# Patient Record
Sex: FEMALE | Race: BLACK OR AFRICAN AMERICAN | NOT HISPANIC OR LATINO
[De-identification: names, ages, dates, MRNs, and addresses within clinical notes are randomized per-mention and may not be internally consistent; named-entity substitution may affect disease eponyms.]

---

## 2019-06-19 PROBLEM — Z00.00 ENCOUNTER FOR PREVENTIVE HEALTH EXAMINATION: Status: ACTIVE | Noted: 2019-06-19

## 2019-07-22 ENCOUNTER — APPOINTMENT (OUTPATIENT)
Dept: ORTHOPEDIC SURGERY | Facility: CLINIC | Age: 54
End: 2019-07-22
Payer: MEDICARE

## 2019-08-26 ENCOUNTER — APPOINTMENT (OUTPATIENT)
Dept: ORTHOPEDIC SURGERY | Facility: CLINIC | Age: 54
End: 2019-08-26
Payer: MEDICARE

## 2019-09-23 ENCOUNTER — APPOINTMENT (OUTPATIENT)
Dept: ORTHOPEDIC SURGERY | Facility: CLINIC | Age: 54
End: 2019-09-23
Payer: MEDICARE

## 2019-09-23 VITALS — OXYGEN SATURATION: 98 % | WEIGHT: 198 LBS | HEART RATE: 86 BPM | HEIGHT: 65 IN | BODY MASS INDEX: 32.99 KG/M2

## 2019-09-23 DIAGNOSIS — Z87.09 PERSONAL HISTORY OF OTHER DISEASES OF THE RESPIRATORY SYSTEM: ICD-10-CM

## 2019-09-23 DIAGNOSIS — Z82.61 FAMILY HISTORY OF ARTHRITIS: ICD-10-CM

## 2019-09-23 DIAGNOSIS — F17.200 NICOTINE DEPENDENCE, UNSPECIFIED, UNCOMPLICATED: ICD-10-CM

## 2019-09-23 DIAGNOSIS — Z87.39 PERSONAL HISTORY OF OTHER DISEASES OF THE MUSCULOSKELETAL SYSTEM AND CONNECTIVE TISSUE: ICD-10-CM

## 2019-09-23 PROCEDURE — 73564 X-RAY EXAM KNEE 4 OR MORE: CPT | Mod: LT

## 2019-09-23 PROCEDURE — 73560 X-RAY EXAM OF KNEE 1 OR 2: CPT | Mod: RT

## 2019-09-23 PROCEDURE — 20610 DRAIN/INJ JOINT/BURSA W/O US: CPT | Mod: LT

## 2019-09-23 PROCEDURE — 99204 OFFICE O/P NEW MOD 45 MIN: CPT | Mod: 25

## 2019-09-23 NOTE — PROCEDURE
[de-identified] : Discussed at length the procedure of a knee aspiration. The risks, benefits, convalescence and alternatives were reviewed. The possible side effects discussed included but were not limited to: pain, swelling, bleeding and infection. Following this discussion, the knee was prepped with betadine and under a sterile condition, a 18 gauge needle was inserted into the joint through a lateral approach just superior to the patella with the knee in an extended position. The fluid was aspirated and sent for evaluation. Upon withdrawal of the needle the site was cleaned with alcohol and a bandaid applied. The patient tolerated the aspiration well and there were no adverse effects. Post aspiration instructions included no strenuous activity for 24 hours, cryotherapy and if there are any adverse effects to contact the office.\par \par 30 cc of clear yellow synovial fluid was aspirated from the left knee.

## 2019-09-23 NOTE — HISTORY OF PRESENT ILLNESS
[de-identified] : 53 year old female presents for initial evaluation of chronic left knee pain for the past 6 years. Her history is significant for a septic left knee at [facility] in 2013, treated twice with I&D. The infection was unresolved and she underwent primary spacer insertion several months later with non weight bearing. Patient did have a fracture of her spacer, but then underwent TKR at [facility]. She continued to have difficulty with severe pain and instability. She endorses swelling, grinding, and knocking. Her pain is diffuse and severely limiting her ADL. Patient uses crutches for ambulation and applies a brace daily. She currently takes Oxycodone given  by her PCP for pain control. Of note, she had a right TKR at [facility]  in 2019. A bone scan recently performed possible loosening of her right TKR.

## 2019-09-23 NOTE — DISCUSSION/SUMMARY
[de-identified] : Discussed at length the nature of the patients condition. 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 Discussed at length the nature of the failed total knee replacement and reviewed non-operative and operative treatment. Due to the pain and associated disability I recommend a LEFT revision total knee replacement. The risks, benefits, convalescence and alternatives were reviewed.  Numerous questions were asked and answered. Models were used as an educational tool. We did discuss implant choice and fixation, with shared decision making with the patient. Surgery will be scheduled at a convenient time. LCCK Prosthesis and TM cones should be available. Preop medical clearance. The left knee was aspirated as described above. I will order CBC, ESR, CRP, and D-DIMER as a baseline. When results are available, we will discuss further.

## 2019-09-23 NOTE — ADDENDUM
[FreeTextEntry1] : This note was written by Martha Johnson on 09/23/2019 acting as scribe for Dr. Parth Barraza M.D.\par \par I, Dr. Parth Barraza M.D., have read and attest that all the information, medical decision making and discharge instructions within are true and accurate.

## 2019-09-23 NOTE — PHYSICAL EXAM
[de-identified] : General appearance: well nourished and hydrated, pleasant, alert and oriented x 3, cooperative.\par HEENT: Normocephalic, EOM intact, Nasal septum midline, Oral cavity clear, External auditory canal clear.\par Cardiovascular: no apparent abnormalities, no lower leg edema, no varicosities, pedal pulses are palpable.\par Lymphatics Lymph nodes: none palpated, Lymphedema: not present.\par Neurologic: sensation is normal, no muscle weakness in upper or lower extremities, patella tendon reflexes intact .\par Dermatologic no apparent skin lesions, moist, warm, no rash.\par Spine: cervical spine appears normal and moves freely, thoracic spine appears normal and moves freely, limited mobility at lumbosacral spine \par Gait: bilateral lateral thrust\par \par Left Knee\par Inspection: mild effusion\par Wounds: healed midline incision\par Alignment: slight varus alignment \par Palpation: no specific tenderness on palpation.\par ROM: Active (in degrees) 0-100 \par Ligamentous laxity (neg): medial lateral laxity in flexion and extension, increased AP translation \par Patellofemoral Alignment Test: Q angle-, normal.\par Muscle Test: good quad strength.\par Leg examination: calf is soft and non-tender.\par \par Right Knee\par Inspection: no effusion\par Wounds: healed midline incision\par Alignment: normal.\par Palpation: no specific tenderness on palpation.\par ROM: Active (in degrees) 0-90\par Ligamentous laxity (neg): negative ant. drawer test, negative post. drawer test, stable to varus stress test, stable to valgus stress test,\par Patellofemoral Alignment Test: Q angle-, normal.\par Muscle Test: good quad strength.\par Leg examination: calf is soft and non-tender. \par \par Left hip\par Inspection: No swelling or ecchymosis.\par Wounds: none.\par Palpation: non-tender.\par Stability: no instability.\par Strength: 5/5 all motor groups.\par ROM: no pain with FROM.\par Leg length: equal.\par \par Right hip\par Inspection: No swelling or ecchymosis.\par Wounds: none.\par Palpation: non-tender.\par Stability: no instability.\par Strength: 5/5 all motor groups.\par ROM: no pain with FROM.\par Leg length: equal.\par \par Left ankle\par Inspection: no erythema noted, no swelling noted.\par Palpation: no pain on palpation .\par ROM: FROM without crepitus.\par Muscle strength: 5/5.\par Stability: no instability noted.\par \par Right ankle\par Inspection: no erythema noted, no swelling noted.\par ROM: FROM without crepitus.\par Palpation: no pain on palpation .\par Muscle strength: 5/5.\par Stability: no instability noted.\par \par Left foot\par Inspection: color, texture and turgor are normal.\par ROM: full range of motion of all joints without pain or crepitus.\par Palpation: no tenderness.\par Stability: no instability noted.\par \par Right foot\par Inspection: color, texture and turgor are normal.\par ROM: full range of motion of all joints without pain or crepitus.\par Palpation: no tenderness.\par Stability: no instability noted.\par \par Left shoulder\par Inspection: no muscle asymmetry, no atrophy.\par Palpation: no tenderness noted, ACJ non-tender.\par ROM: full active ROM, full passive ROM.\par Strength testing): anterior deltoid, supraspinatus, infraspinatus, subscapularis all 5/5.\par Stability test: ant. apprehension negative, post. apprehension negative, relocation test negative.\par Impingement Test: negative NEER.\par \par Right shoulder\par Inspection: no muscle asymmetry, no atrophy.\par Palpation: no tenderness noted, ACJ non-tender.\par ROM: full active ROM, full passive ROM.\par Strength testing): anterior deltoid, supraspinatus, infraspinatus, subscapularis all 5/5.\par Stability test: ant. apprehension negative, post. apprehension negative, relocation test negative.\par Impingement Test: negative NEER.\par Surgical Wounds: none.\par \par Left elbow\par Inspection: negative swelling.\par Wounds: none.\par Palpation: non-tender.\par ROM: full ROM.\par Strength: 5/5 all groups.\par Stability: no instability.\par Mass: none.\par \par Right elbow\par Inspection: negative swelling.\par Wounds: none.\par Palpation: non-tender.\par ROM: full ROM.\par Strength: 5/5 all groups.\par Stability: no instability.\par Mass: none.\par \par Left wrist\par Inspection: negative swelling.\par Wound: none.\par Palpation (bone): no tenderness.\par ROM: full ROM.\par Strength: full , good.\par \par Right wrist\par Inspection: negative swelling.\par Wound: none.\par Palpation (bone): no tenderness.\par ROM: full ROM.\par Strength: full , good.\par \par Left hand\par Inspection: no skin changes, normal appearance.\par Wounds: none.\par Strength: full , able to make full fist.\par Sensation: light touch intact all fingers and thumb.\par Vascular: good capillary refill < 3 seconds, all fingers and thumb.\par Mass: none.\par \par Right hand\par Inspection: no skin changes, normal appearance. \par Wounds: none.\par Palpation: non-tender throughout.\par Strength: full , able to make full fist.\par Sensation: light touch intact all fingers and thumb.\par Vascular: good capillary refill < 3 seconds, all fingers and thumb.\par Mass: none.\par   [de-identified] : Left knee xrays, AP, lateral, merchant view taken at the office today demonstrates a revision total knee prosthesis with long stems, radiolucent lines and sclerotic mantle around the femoral and tibial stem with femoral lateral cortex hypertrophy consistent with loosening, radiolucent lines beneath tibial component, asymmetry of polyethylene suggestive of either wear or instability, speckled calcification posterior capsule, patella sits at an appropriate height in a central position with impingement of lateral facet \par \par Right knee xray merchant view taken at the office today demonstrates a total knee replacement in satisfactory position and alignment with the patella in a central position

## 2019-10-14 ENCOUNTER — APPOINTMENT (OUTPATIENT)
Dept: ORTHOPEDIC SURGERY | Facility: CLINIC | Age: 54
End: 2019-10-14

## 2020-01-13 ENCOUNTER — FORM ENCOUNTER (OUTPATIENT)
Age: 55
End: 2020-01-13

## 2020-01-14 ENCOUNTER — OUTPATIENT (OUTPATIENT)
Dept: OUTPATIENT SERVICES | Facility: HOSPITAL | Age: 55
LOS: 1 days | End: 2020-01-14
Payer: MEDICARE

## 2020-01-14 ENCOUNTER — APPOINTMENT (OUTPATIENT)
Dept: ORTHOPEDIC SURGERY | Facility: CLINIC | Age: 55
End: 2020-01-14
Payer: MEDICARE

## 2020-01-14 ENCOUNTER — APPOINTMENT (OUTPATIENT)
Dept: ORTHOPEDIC SURGERY | Facility: HOSPITAL | Age: 55
End: 2020-01-14

## 2020-01-14 VITALS — HEIGHT: 65 IN | WEIGHT: 198 LBS | BODY MASS INDEX: 32.99 KG/M2

## 2020-01-14 DIAGNOSIS — M25.562 PAIN IN LEFT KNEE: ICD-10-CM

## 2020-01-14 DIAGNOSIS — T84.033A MECHANICAL LOOSENING OF INTERNAL LEFT KNEE PROSTHETIC JOINT, INITIAL ENCOUNTER: ICD-10-CM

## 2020-01-14 DIAGNOSIS — Z96.652 PRESENCE OF LEFT ARTIFICIAL KNEE JOINT: ICD-10-CM

## 2020-01-14 DIAGNOSIS — Z96.659 OTHER MECHANICAL COMPLICATION OF OTHER INTERNAL JOINT PROSTHESIS, INITIAL ENCOUNTER: ICD-10-CM

## 2020-01-14 DIAGNOSIS — Z87.39 PERSONAL HISTORY OF OTHER DISEASES OF THE MUSCULOSKELETAL SYSTEM AND CONNECTIVE TISSUE: ICD-10-CM

## 2020-01-14 DIAGNOSIS — T84.098A OTHER MECHANICAL COMPLICATION OF OTHER INTERNAL JOINT PROSTHESIS, INITIAL ENCOUNTER: ICD-10-CM

## 2020-01-14 PROCEDURE — 87075 CULTR BACTERIA EXCEPT BLOOD: CPT

## 2020-01-14 PROCEDURE — 89060 EXAM SYNOVIAL FLUID CRYSTALS: CPT

## 2020-01-14 PROCEDURE — 72170 X-RAY EXAM OF PELVIS: CPT

## 2020-01-14 PROCEDURE — 20610 DRAIN/INJ JOINT/BURSA W/O US: CPT | Mod: LT

## 2020-01-14 PROCEDURE — 87205 SMEAR GRAM STAIN: CPT

## 2020-01-14 PROCEDURE — 72170 X-RAY EXAM OF PELVIS: CPT | Mod: 26

## 2020-01-14 PROCEDURE — 73560 X-RAY EXAM OF KNEE 1 OR 2: CPT | Mod: 26,50

## 2020-01-14 PROCEDURE — 99215 OFFICE O/P EST HI 40 MIN: CPT | Mod: 25

## 2020-01-14 PROCEDURE — 87070 CULTURE OTHR SPECIMN AEROBIC: CPT

## 2020-01-14 PROCEDURE — 73560 X-RAY EXAM OF KNEE 1 OR 2: CPT

## 2020-01-14 PROCEDURE — 89051 BODY FLUID CELL COUNT: CPT

## 2020-01-14 PROCEDURE — 87102 FUNGUS ISOLATION CULTURE: CPT

## 2020-01-14 NOTE — HISTORY OF PRESENT ILLNESS
[de-identified] : 53y/o female presenting for evaluation of troublesome bilateral total knee replacements. She reports that she had septic arthritis of the left knee in 2012. This was treated with two I&Ds at [facility] in 2012, followed by an antibiotic spacer later that year. She ambulated on the spacer for over two years before a definitive TKA was performed at [facility] in 2015. She got some improvement in left knee function for some time, but then the knee became more painful and swollen at some unspecified amount of time later. She complains also of left knee instability. She underwent right TKA at [place] in March 2019. The right side is doing ok but she complains of an apparently limb-length discrepancy now with the right side longer. She has been using a shoe lift without much relief. She saw Dr. Barraza for all these complaints in August and a knee aspiration was performed. Symptoms are about the same now. She is seeking surgical evaluation.\par \par PMH significant only for asthma. She admits to smoking about a half pack per day on average. None

## 2020-01-14 NOTE — PROCEDURE
[Aspiration] : Aspiration [Left] : of the left [Knee Joint] : knee joint [Diagnostic] : Diagnostic [Patient] : patient [Alcohol] : Alcohol [Betadine] : Betadine [Ethyl Chloride Spray] : ethyl chloride spray was used as a topical anesthetic [Lateral] : lateral [18] : an 18-gauge [___ mL Fluid] : [unfilled] mL of [Cloudy] : cloudy [Bloody] : bloody [Bandage Applied] : a bandage [Culture] : culture [Cell Count] : cell count [Gram Stain] : gram stain [Crystal Analysis] : crystal analysis [None] : none

## 2020-01-14 NOTE — PHYSICAL EXAM
[de-identified] : General appearance: well nourished and hydrated, pleasant, alert and oriented x 3, cooperative.  Cheerful.\par HEENT: normocephalic, EOM intact, nasal septum midline, oral cavity clear, external auditory canal clear.  \par Cardiovascular: no lower leg edema, no varicosities, dorsalis pedis pulses palpable and symmetric.  \par Lymphatics: no palpable lymphadenopathy, no lymphedema.  \par Neurologic: sensation is normal, no muscle weakness in upper or lower extremities, patella tendon reflexes present and symmetric.  \par Dermatologic: skin moist, warm, no rash.  \par Spine: cervical spine with normal lordosis and painless range of motion, thoracic spine with normal kyphosis and painless range of motion, lumbosacral spine with normal lordosis and painless range of motion.  No tenderness to palpation along midline spine and paraspinal musculature.  Sacroiliac joints nontender bilaterally. Negative SLR and crossed SLR tests bilaterally.\par Gait: waddling gait with more pronounced left lateral thrust.\par \par Left knee:\par - Inspection: large effusion with soft tissue swelling, negative ecchymosis or erythema.  \par - Wounds: healed long midline incision.\par - Alignment: correctable varus.\par - Palpation: no specific tenderness on palpation.  \par - ROM active: 5 hyper - 90, no pain on extremes of motion\par - Ligamentous laxity: about 1-2cm ant/post translation on flexion drawers, marked pseudoinstability to varus stress, stable to valgus stress.  \par - Popliteal angle: 60 degrees\par - Muscle Test: 5/5 quad strength.  \par \par Right knee:\par - Inspection: small effusion and soft tissue swelling, negative ecchymosis and erythema.  \par - Wounds: healed midline incision, healed stab incisions x2 both proximally and distally\par - Alignment: non-correctable varus.\par - Palpation: no specific tenderness on palpation.  \par - ROM active: 5-100, no pain on extremes of motion\par - Ligamentous laxity: about 5mm ant/post translation of flexion drawers, stable to varus stress, stable to valgus stress.  \par - Popliteal angle: 60 degrees\par - Muscle Test: 5/5 quad strength. [de-identified] : AP pelvis and 1 views of the bilateral knees (apparently non-weightbearing AP) were obtained today. Also reviewed 4 views of the left knee and Merchant of the right from August.\par \par Bilateral hips demonstrate normal alignment with minimal arthritis (Tonnis 1). There is no proximal femoral or acetabular deformity. There is no fracture or prior fracture deformity. There is no radiographic evidence of osteonecrosis.\par \par Bilateral sacroiliac joints display mild arthrosis.\par \par The left knee has a revision total knee replacement in place with tibial and femoral stems. There is varus coronal alignment and normal sagittal alignment. There are radiolucent lines and sclerotic mantles at the bone-implant interfaces throughout both the tibia and femur, consistent with loosening. There is significant bone loss most pronounced at the femoral trochlea and the medial tibial plateau. There are hazy radiodensities throughout the region of the posterior capsule. There is narrowing of the medial tibiofemoral space suspicious for polyethylene wear vs. fracture. The patella sits at appropriate height and appears to be articulating directly with the femoral component although there is a possible central peg hole visible on the Merchant.\par \par The right knee has a primary fully cemented total knee replacement in place with a resurfaced patella. Implants appear to be Triathlon. On the limited views available, the tibia appears to be collapsing into varus. Radiolucent lines at the medial and lateral plateau bone-implant interfaces, though not under/around the keel. Evaluation of the femoral component is limited. Patella appears to be tracking centrally.\par

## 2020-01-14 NOTE — CONSULT LETTER
[Dear  ___] : Dear  [unfilled], [Consult Letter:] : I had the pleasure of evaluating your patient, [unfilled]. [Please see my note below.] : Please see my note below. [Consult Closing:] : Thank you very much for allowing me to participate in the care of this patient.  If you have any questions, please do not hesitate to contact me. [Sincerely,] : Sincerely, [FreeTextEntry3] : Kevon Fay MD\par Orthopaedic Surgery - Adult Reconstruction\par [facility] [place]\par [address]\[place] [address]\par p. [phone]\par f. [phone]\par

## 2020-01-14 NOTE — DISCUSSION/SUMMARY
[de-identified] : 55y/o female with loosening of revision left total knee replacement, possible loosening of right total knee replacement\par - Unclear as to why she has such severe loosening at a relatively early time point. Differential includes persistent infection vs. smoking. She will need revision. The right side is also an issue but not as symptomatic; will require additional workup and possible surgery later on.\par - Left knee aspiration performed as above; will follow up synovial labs\par - Serum CBC+diff, CRP, ESR, D-dimer today\par - CT left knee for preoperative planning\par - Importance of complete nicotine cessation stressed to the patient. She understands and will quit.\par - Will plan for left knee revision arthroplasty (either single stage or two-stage revision pending results of labs) in about 2 months\par - Revisit next month for complete right knee films and likely aspiration. Cotinine check at that time. I asked her to also bring her operative reports for the right TKA and most recent left TKA.

## 2020-01-15 PROBLEM — Z87.39 HISTORY OF ARTHRITIS: Status: RESOLVED | Noted: 2020-01-14 | Resolved: 2020-01-15

## 2020-01-15 LAB
BASOPHILS # BLD AUTO: 0.04 K/UL
BASOPHILS NFR BLD AUTO: 0.6 %
CRP SERPL-MCNC: 1.26 MG/DL
DEPRECATED D DIMER PPP IA-ACNC: 864 NG/ML DDU
EOSINOPHIL # BLD AUTO: 0.6 K/UL
EOSINOPHIL NFR BLD AUTO: 9.1 %
ERYTHROCYTE [SEDIMENTATION RATE] IN BLOOD BY WESTERGREN METHOD: 37 MM/HR
HCT VFR BLD CALC: 41.8 %
HGB BLD-MCNC: 12.7 G/DL
IMM GRANULOCYTES NFR BLD AUTO: 0.2 %
LYMPHOCYTES # BLD AUTO: 2.47 K/UL
LYMPHOCYTES NFR BLD AUTO: 37.7 %
MAN DIFF?: NORMAL
MCHC RBC-ENTMCNC: 29.6 PG
MCHC RBC-ENTMCNC: 30.4 GM/DL
MCV RBC AUTO: 97.4 FL
MONOCYTES # BLD AUTO: 0.34 K/UL
MONOCYTES NFR BLD AUTO: 5.2 %
NEUTROPHILS # BLD AUTO: 3.1 K/UL
NEUTROPHILS NFR BLD AUTO: 47.2 %
PLATELET # BLD AUTO: 396 K/UL
RBC # BLD: 4.29 M/UL
RBC # FLD: 13.3 %
WBC # FLD AUTO: 6.56 K/UL

## 2020-02-10 ENCOUNTER — APPOINTMENT (OUTPATIENT)
Dept: ORTHOPEDIC SURGERY | Facility: CLINIC | Age: 55
End: 2020-02-10

## 2021-06-07 ENCOUNTER — RESULT REVIEW (OUTPATIENT)
Age: 56
End: 2021-06-07

## 2021-06-07 ENCOUNTER — TRANSCRIPTION ENCOUNTER (OUTPATIENT)
Age: 56
End: 2021-06-07

## 2021-06-07 ENCOUNTER — LABORATORY RESULT (OUTPATIENT)
Age: 56
End: 2021-06-07

## 2021-06-07 ENCOUNTER — OUTPATIENT (OUTPATIENT)
Dept: OUTPATIENT SERVICES | Facility: HOSPITAL | Age: 56
LOS: 1 days | End: 2021-06-07
Payer: COMMERCIAL

## 2021-06-07 ENCOUNTER — APPOINTMENT (OUTPATIENT)
Dept: ORTHOPEDIC SURGERY | Facility: CLINIC | Age: 56
End: 2021-06-07
Payer: MEDICARE

## 2021-06-07 VITALS
WEIGHT: 180 LBS | SYSTOLIC BLOOD PRESSURE: 128 MMHG | HEIGHT: 65 IN | DIASTOLIC BLOOD PRESSURE: 70 MMHG | BODY MASS INDEX: 29.99 KG/M2

## 2021-06-07 PROCEDURE — 20610 DRAIN/INJ JOINT/BURSA W/O US: CPT | Mod: RT

## 2021-06-07 PROCEDURE — 99072 ADDL SUPL MATRL&STAF TM PHE: CPT

## 2021-06-07 PROCEDURE — 73564 X-RAY EXAM KNEE 4 OR MORE: CPT | Mod: 26,50

## 2021-06-07 PROCEDURE — 72170 X-RAY EXAM OF PELVIS: CPT

## 2021-06-07 PROCEDURE — 99214 OFFICE O/P EST MOD 30 MIN: CPT | Mod: 25

## 2021-06-07 PROCEDURE — 72170 X-RAY EXAM OF PELVIS: CPT | Mod: 26

## 2021-06-07 PROCEDURE — 73564 X-RAY EXAM KNEE 4 OR MORE: CPT

## 2021-06-07 NOTE — PROCEDURE
[Aspiration] : Aspiration [Right] : of the right [Knee Joint] : knee joint [Diagnostic] : Diagnostic [Patient] : patient [Alcohol] : Alcohol [Betadine] : Betadine [Ethyl Chloride Spray] : ethyl chloride spray was used as a topical anesthetic [Lateral] : lateral [Superior] : superior [18] : an 18-gauge [___ mL Fluid] : [unfilled] mL of [Cloudy] : cloudy [Yellow] : yellow [Bandage Applied] : a bandage [Culture] : culture [Cell Count] : cell count [Gram Stain] : gram stain [Crystal Analysis] : crystal analysis [Tolerated Well] : The patient tolerated the procedure well [None] : none [FreeTextEntry1] : Right knee aspiration was performed with yield of 6cc cloudy yellow fluid. In addition to the standard infection panel, the aspirate was applied to a point of care Synovasure alpha defensin assay immediately. The assay was positive for periprosthetic infection at 10, 15, and 20 minutes.

## 2021-06-07 NOTE — PHYSICAL EXAM
[de-identified] : General appearance: well nourished and hydrated, pleasant, alert and oriented x 3, cooperative.  Tearful today.\par HEENT: normocephalic, EOM intact, wearing mask, external auditory canal clear.  \par Cardiovascular: no lower leg edema, no varicosities, dorsalis pedis pulses palpable and symmetric.  \par Lymphatics: no palpable lymphadenopathy, no lymphedema.  \par Neurologic: sensation is normal, no muscle weakness in upper or lower extremities, patella tendon reflexes present and symmetric.  \par Dermatologic: skin moist, warm, no rash.  \par Spine: cervical spine with normal lordosis and painless range of motion, thoracic spine with normal kyphosis and painless range of motion, lumbosacral spine with normal lordosis and painless range of motion.  No tenderness to palpation along midline spine and paraspinal musculature.  Sacroiliac joints nontender bilaterally. Negative SLR and crossed SLR tests bilaterally.\par Gait: severely antalgic right with cane; pronounced right varus thrust.\par \par Left knee:\par - Inspection: moderate effusion with soft tissue swelling, negative ecchymosis or erythema.  \par - Wounds: healed long midline incision.\par - Alignment: correctable varus.\par - Palpation: no specific tenderness on palpation.  \par - ROM active: 5 hyper - 90, no pain on extremes of motion\par - Ligamentous laxity: about 1-2cm ant/post translation on flexion drawers, pseudoinstability to varus stress, stable to valgus stress.  \par - Popliteal angle: 60 degrees\par - Muscle Test: 5/5 quad strength.  \par \par Right knee:\par - Inspection: small effusion and moderate diffuse soft tissue swelling, negative ecchymosis and erythema.  \par - Wounds: healed midline incision, healed stab incisions x2 both proximally and distally\par - Alignment: gross varus, partially correctable.\par - Palpation: circumferential tenderness on palpation.  \par - ROM active: 0-90 with pain throughout\par - Ligamentous laxity: about 5mm ant/post translation on flexion drawers, grossly pseudolax to varus stress, stable to valgus stress.  \par - Popliteal angle: 60 degrees\par - Muscle Test: 5/5 quad strength. [de-identified] : AP pelvis and 4 views of the bilateral knees (weightbearing AP, weightbearing Velasco, weightbearing lateral, and Sunrise) were obtained today, interpreted by me, and reviewed with the patient.\par \par Pelvic alignment: normal\par \par Right hip --\par Alignment: normal\par Arthritis: none\par Deformity: none\par Osteonecrosis: none\par \par Left hip --\par Alignment: normal\par Arthritis: none\par Deformity: none\par Osteonecrosis: none\par \par Right knee -- constrained Triathlon in position with significantly worse tibial loosening and leslie varus collapse with major proximal medial tibial bone loss as compared to prior imaging. Patella may be a touch baja and appears to track centrally.\par \par The left knee has a revision total knee replacement in place with tibial and femoral stems. There is varus coronal alignment and normal sagittal alignment. There are radiolucent lines and sclerotic mantles at the bone-implant interfaces throughout both the tibia and femur, consistent with loosening. There is significant bone loss most pronounced at the femoral trochlea and the medial tibial plateau. There are hazy radiodensities throughout the region of the posterior capsule. There is narrowing of the medial tibiofemoral space suspicious for polyethylene wear vs. fracture. The patella sits at appropriate height and appears to be articulating directly with the femoral component although there is a possible central peg hole visible on the Merchant. No major change from prior imaging.

## 2021-06-07 NOTE — REASON FOR VISIT
[Initial Visit] : an initial visit for [Knee Pain] : knee pain [FreeTextEntry2] : bilateral knee left > right  [Follow-Up Visit] : a follow-up visit for [Other: ____] : [unfilled]

## 2021-06-07 NOTE — HISTORY OF PRESENT ILLNESS
[de-identified] : 6/7/21: Reports that the left knee pain is about the same as when we last saw each other. She was never able to stop smoking and our surgical plans had been disrupted by the pandemic. However, it is now the right knee that is far more symptomatic. She had an episode in March where both legs became swollen and red. This eventually resolved but the right knee developed progressive pain and varus deformity from there. She is still ambulating with a cane but has enormous difficulty crossing a room. She is seeking surgical management as soon as possible. She denies wound problems, fevers, chills, or other systemic symptoms.\par \par 1/14/20: 53y/o female presenting for evaluation of troublesome bilateral total knee replacements. She reports that she had septic arthritis of the left knee in 2012. This was treated with two I&Ds at [facility] in 2012, followed by an antibiotic spacer later that year. She ambulated on the spacer for over two years before a definitive TKA was performed at [facility] in 2015. She got some improvement in left knee function for some time, but then the knee became more painful and swollen at some unspecified amount of time later. She complains also of left knee instability. She underwent right TKA at [facility] in March 2019. The right side is doing ok but she complains of an apparently limb-length discrepancy now with the right side longer. She has been using a shoe lift without much relief. She saw Dr. Barraza for all these complaints in August and a knee aspiration was performed. Symptoms are about the same now. She is seeking surgical evaluation.\par \par PMH significant only for asthma. She admits to smoking about a half pack per day on average.

## 2021-06-17 ENCOUNTER — INPATIENT (INPATIENT)
Facility: HOSPITAL | Age: 56
LOS: 7 days | Discharge: HOME HEALTH | DRG: 464 | End: 2021-06-25
Attending: ORTHOPAEDIC SURGERY | Admitting: ORTHOPAEDIC SURGERY
Payer: MEDICARE

## 2021-06-17 ENCOUNTER — TRANSCRIPTION ENCOUNTER (OUTPATIENT)
Age: 56
End: 2021-06-17

## 2021-06-17 VITALS
TEMPERATURE: 98 F | HEART RATE: 89 BPM | OXYGEN SATURATION: 99 % | HEIGHT: 65 IN | DIASTOLIC BLOOD PRESSURE: 70 MMHG | WEIGHT: 186.95 LBS | SYSTOLIC BLOOD PRESSURE: 121 MMHG | RESPIRATION RATE: 20 BRPM

## 2021-06-17 LAB
ALBUMIN SERPL ELPH-MCNC: 3.5 G/DL — SIGNIFICANT CHANGE UP (ref 3.3–5)
ALP SERPL-CCNC: 121 U/L — HIGH (ref 40–120)
ALT FLD-CCNC: <5 U/L — LOW (ref 10–45)
ANION GAP SERPL CALC-SCNC: 7 MMOL/L — SIGNIFICANT CHANGE UP (ref 5–17)
APTT BLD: 35.8 SEC — HIGH (ref 27.5–35.5)
AST SERPL-CCNC: 17 U/L — SIGNIFICANT CHANGE UP (ref 10–40)
B PERT IGG+IGM PNL SER: SIGNIFICANT CHANGE UP
B PERT IGG+IGM PNL SER: SIGNIFICANT CHANGE UP
BASOPHILS # BLD AUTO: 0.05 K/UL — SIGNIFICANT CHANGE UP (ref 0–0.2)
BASOPHILS NFR BLD AUTO: 0.7 % — SIGNIFICANT CHANGE UP (ref 0–2)
BILIRUB SERPL-MCNC: 0.2 MG/DL — SIGNIFICANT CHANGE UP (ref 0.2–1.2)
BLD GP AB SCN SERPL QL: POSITIVE — SIGNIFICANT CHANGE UP
BUN SERPL-MCNC: 9 MG/DL — SIGNIFICANT CHANGE UP (ref 7–23)
CALCIUM SERPL-MCNC: 9 MG/DL — SIGNIFICANT CHANGE UP (ref 8.4–10.5)
CHLORIDE SERPL-SCNC: 102 MMOL/L — SIGNIFICANT CHANGE UP (ref 96–108)
CO2 SERPL-SCNC: 30 MMOL/L — SIGNIFICANT CHANGE UP (ref 22–31)
COLOR FLD: SIGNIFICANT CHANGE UP
COLOR FLD: SIGNIFICANT CHANGE UP
CREAT SERPL-MCNC: 0.72 MG/DL — SIGNIFICANT CHANGE UP (ref 0.5–1.3)
CRP SERPL-MCNC: 60.3 MG/L — HIGH (ref 0–4)
EOSINOPHIL # BLD AUTO: 0.57 K/UL — HIGH (ref 0–0.5)
EOSINOPHIL NFR BLD AUTO: 7.8 % — HIGH (ref 0–6)
ERYTHROCYTE [SEDIMENTATION RATE] IN BLOOD: 54 MM/HR — HIGH
FLUID INTAKE SUBSTANCE CLASS: SIGNIFICANT CHANGE UP
FLUID INTAKE SUBSTANCE CLASS: SIGNIFICANT CHANGE UP
FLUID SEGMENTED GRANULOCYTES: 73 % — SIGNIFICANT CHANGE UP
FLUID SEGMENTED GRANULOCYTES: 85 % — SIGNIFICANT CHANGE UP
GLUCOSE SERPL-MCNC: 93 MG/DL — SIGNIFICANT CHANGE UP (ref 70–99)
GRAM STN FLD: SIGNIFICANT CHANGE UP
GRAM STN FLD: SIGNIFICANT CHANGE UP
HCT VFR BLD CALC: 40.7 % — SIGNIFICANT CHANGE UP (ref 34.5–45)
HGB BLD-MCNC: 12.5 G/DL — SIGNIFICANT CHANGE UP (ref 11.5–15.5)
IMM GRANULOCYTES NFR BLD AUTO: 0.3 % — SIGNIFICANT CHANGE UP (ref 0–1.5)
INR BLD: 1.05 — SIGNIFICANT CHANGE UP (ref 0.88–1.16)
LYMPHOCYTES # BLD AUTO: 2 K/UL — SIGNIFICANT CHANGE UP (ref 1–3.3)
LYMPHOCYTES # BLD AUTO: 27.4 % — SIGNIFICANT CHANGE UP (ref 13–44)
LYMPHOCYTES # FLD: 1 % — SIGNIFICANT CHANGE UP
LYMPHOCYTES # FLD: 5 % — SIGNIFICANT CHANGE UP
MCHC RBC-ENTMCNC: 27.7 PG — SIGNIFICANT CHANGE UP (ref 27–34)
MCHC RBC-ENTMCNC: 30.7 GM/DL — LOW (ref 32–36)
MCV RBC AUTO: 90 FL — SIGNIFICANT CHANGE UP (ref 80–100)
MONOCYTES # BLD AUTO: 0.59 K/UL — SIGNIFICANT CHANGE UP (ref 0–0.9)
MONOCYTES NFR BLD AUTO: 8.1 % — SIGNIFICANT CHANGE UP (ref 2–14)
MONOS+MACROS # FLD: 14 % — SIGNIFICANT CHANGE UP
MONOS+MACROS # FLD: 22 % — SIGNIFICANT CHANGE UP
NEUTROPHILS # BLD AUTO: 4.08 K/UL — SIGNIFICANT CHANGE UP (ref 1.8–7.4)
NEUTROPHILS NFR BLD AUTO: 55.7 % — SIGNIFICANT CHANGE UP (ref 43–77)
NRBC # BLD: 0 /100 WBCS — SIGNIFICANT CHANGE UP (ref 0–0)
PLATELET # BLD AUTO: 466 K/UL — HIGH (ref 150–400)
POTASSIUM SERPL-MCNC: 4.4 MMOL/L — SIGNIFICANT CHANGE UP (ref 3.5–5.3)
POTASSIUM SERPL-SCNC: 4.4 MMOL/L — SIGNIFICANT CHANGE UP (ref 3.5–5.3)
PROT SERPL-MCNC: 7.7 G/DL — SIGNIFICANT CHANGE UP (ref 6–8.3)
PROTHROM AB SERPL-ACNC: 12.6 SEC — SIGNIFICANT CHANGE UP (ref 10.6–13.6)
RBC # BLD: 4.52 M/UL — SIGNIFICANT CHANGE UP (ref 3.8–5.2)
RBC # FLD: 13.3 % — SIGNIFICANT CHANGE UP (ref 10.3–14.5)
RCV VOL RI: HIGH /UL (ref 0–0)
RCV VOL RI: HIGH /UL (ref 0–0)
RH IG SCN BLD-IMP: NEGATIVE — SIGNIFICANT CHANGE UP
RH IG SCN BLD-IMP: NEGATIVE — SIGNIFICANT CHANGE UP
SARS-COV-2 RNA SPEC QL NAA+PROBE: NEGATIVE — SIGNIFICANT CHANGE UP
SODIUM SERPL-SCNC: 139 MMOL/L — SIGNIFICANT CHANGE UP (ref 135–145)
SPECIMEN SOURCE FLD: SIGNIFICANT CHANGE UP
SPECIMEN SOURCE FLD: SIGNIFICANT CHANGE UP
SPECIMEN SOURCE: SIGNIFICANT CHANGE UP
SPECIMEN SOURCE: SIGNIFICANT CHANGE UP
SYNOVIAL CRYSTALS CLARITY: ABNORMAL
SYNOVIAL CRYSTALS CLARITY: SIGNIFICANT CHANGE UP
SYNOVIAL CRYSTALS COLOR: ABNORMAL
SYNOVIAL CRYSTALS COLOR: ABNORMAL
SYNOVIAL CRYSTALS ID: SIGNIFICANT CHANGE UP
SYNOVIAL CRYSTALS ID: SIGNIFICANT CHANGE UP
SYNOVIAL CRYSTALS TUBE: SIGNIFICANT CHANGE UP
SYNOVIAL CRYSTALS TUBE: SIGNIFICANT CHANGE UP
TOTAL NUCLEATED CELL COUNT, BODY FLUID: 1817 /UL — SIGNIFICANT CHANGE UP
TOTAL NUCLEATED CELL COUNT, BODY FLUID: 2307 /UL — SIGNIFICANT CHANGE UP
TUBE TYPE: SIGNIFICANT CHANGE UP
TUBE TYPE: SIGNIFICANT CHANGE UP
WBC # BLD: 7.31 K/UL — SIGNIFICANT CHANGE UP (ref 3.8–10.5)
WBC # FLD AUTO: 7.31 K/UL — SIGNIFICANT CHANGE UP (ref 3.8–10.5)

## 2021-06-17 PROCEDURE — 99222 1ST HOSP IP/OBS MODERATE 55: CPT

## 2021-06-17 PROCEDURE — 99233 SBSQ HOSP IP/OBS HIGH 50: CPT

## 2021-06-17 PROCEDURE — 71045 X-RAY EXAM CHEST 1 VIEW: CPT | Mod: 26

## 2021-06-17 PROCEDURE — 99285 EMERGENCY DEPT VISIT HI MDM: CPT

## 2021-06-17 PROCEDURE — 86077 PHYS BLOOD BANK SERV XMATCH: CPT

## 2021-06-17 RX ORDER — APREPITANT 80 MG/1
40 CAPSULE ORAL ONCE
Refills: 0 | Status: COMPLETED | OUTPATIENT
Start: 2021-06-18 | End: 2021-06-18

## 2021-06-17 RX ORDER — CHLORHEXIDINE GLUCONATE 213 G/1000ML
1 SOLUTION TOPICAL ONCE
Refills: 0 | Status: COMPLETED | OUTPATIENT
Start: 2021-06-18 | End: 2021-06-18

## 2021-06-17 RX ORDER — MAGNESIUM HYDROXIDE 400 MG/1
30 TABLET, CHEWABLE ORAL DAILY
Refills: 0 | Status: DISCONTINUED | OUTPATIENT
Start: 2021-06-17 | End: 2021-06-18

## 2021-06-17 RX ORDER — OXYCODONE HYDROCHLORIDE 5 MG/1
30 TABLET ORAL EVERY 6 HOURS
Refills: 0 | Status: DISCONTINUED | OUTPATIENT
Start: 2021-06-17 | End: 2021-06-18

## 2021-06-17 RX ORDER — POVIDONE-IODINE 5 %
1 AEROSOL (ML) TOPICAL ONCE
Refills: 0 | Status: COMPLETED | OUTPATIENT
Start: 2021-06-18 | End: 2021-06-18

## 2021-06-17 RX ORDER — SODIUM CHLORIDE 9 MG/ML
1000 INJECTION, SOLUTION INTRAVENOUS
Refills: 0 | Status: DISCONTINUED | OUTPATIENT
Start: 2021-06-17 | End: 2021-06-18

## 2021-06-17 RX ORDER — CELECOXIB 200 MG/1
400 CAPSULE ORAL ONCE
Refills: 0 | Status: COMPLETED | OUTPATIENT
Start: 2021-06-18 | End: 2021-06-18

## 2021-06-17 RX ORDER — GABAPENTIN 400 MG/1
600 CAPSULE ORAL ONCE
Refills: 0 | Status: COMPLETED | OUTPATIENT
Start: 2021-06-18 | End: 2021-06-18

## 2021-06-17 RX ORDER — ONDANSETRON 8 MG/1
4 TABLET, FILM COATED ORAL EVERY 4 HOURS
Refills: 0 | Status: DISCONTINUED | OUTPATIENT
Start: 2021-06-17 | End: 2021-06-18

## 2021-06-17 RX ORDER — ACETAMINOPHEN 500 MG
1000 TABLET ORAL ONCE
Refills: 0 | Status: COMPLETED | OUTPATIENT
Start: 2021-06-18 | End: 2021-06-18

## 2021-06-17 RX ADMIN — OXYCODONE HYDROCHLORIDE 30 MILLIGRAM(S): 5 TABLET ORAL at 14:15

## 2021-06-17 RX ADMIN — ONDANSETRON 4 MILLIGRAM(S): 8 TABLET, FILM COATED ORAL at 19:23

## 2021-06-17 RX ADMIN — OXYCODONE HYDROCHLORIDE 30 MILLIGRAM(S): 5 TABLET ORAL at 15:15

## 2021-06-17 NOTE — ED PROVIDER NOTE - OBJECTIVE STATEMENT
55F PMH asthma, b/l knee replacements, presents for operative management. Pt has increasing pain/swelling to R knee for several mos. Following w/ Dr. Fay. Saw Dr. Fay ~10d ago, referred to ED today for operative management. Pt has no new complaints since visit w/ Dr. Fay.  Denies f/c, SOB/CP, NVD, abd pain, urinary complaints, focal weakness/numbness, URI symptoms.

## 2021-06-17 NOTE — H&P ADULT - NSHPLABSRESULTS_GEN_ALL_CORE
Preop cbc, bmp, pt/inr, ptt, ua, ESR, CRP ord to be reviewed by medical clearance.  preop cxr ord to be reviewed by medical clearance  preop ekg ord to be reviewed by medical clearance

## 2021-06-17 NOTE — CONSULT NOTE ADULT - ATTENDING COMMENTS
Pt evaluated for pain control. Chart reviewed, plan discussed and agreed. We will continue the current regimen and titrate to pain control and side effects. We will follow up. Dr. Espinoza

## 2021-06-17 NOTE — ED ADULT TRIAGE NOTE - CHIEF COMPLAINT QUOTE
Pt presents to ED w/ c/o infection and broken implant to R knee. Pt states pain 10/10, swelling noted, warm to touch. Pt is scheduled for surgery with MD KU tomorrow. DEnies fever/chills/discharge

## 2021-06-17 NOTE — H&P ADULT - NSHPPHYSICALEXAM_GEN_ALL_CORE
General: Alert and oriented, NAD  MSK:  R Knee: right knee swollen, nonerythematous, no open wounds, n  EHL/FHL/TA/Gastro ******  DP's ****  Gross sensation to light touch intact throughout lower extremities **  **Remainder of PE as per medical clearance** General: Alert and oriented, NAD  MSK:  R Knee: right knee swollen, nonerythematous, no open wounds, TKA incision well healed.  Pt able to actively flex to 90 degrees PROM to about a 100degrees flexion.  TA/GS/EHL/FHL Firing   L knee: + swelling, nonerythematous, no open wounds, TKA incision well healed   EHL/FHL/TA/Gastro ******  DP's ****  Gross sensation to light touch intact throughout lower extremities **  **Remainder of PE as per medical clearance** General: Alert and oriented, NAD  MSK:  R Knee: right knee swollen, nonerythematous, no open wounds, TKA incision well healed.  Pt able to actively flex to 90 degrees PROM to about 100 degrees flexion.  TA/GS/EHL/FHL Firing, DPs palpable, SILT intact  L knee: + swelling, nonerythematous, no open wounds, TKA incision well healed, pt able to actively flex to to 90 degrees PROM to about 100 degrees flexion. EHL/FHL/TA/Gastro firing, DPs palpable, SILT intact    Procedure  R knee aspiration; knee prepped with chlorhexidine 18gauge needle inserted into joint space 5cc of synovial fluid aspirated.  L knee aspiration: knee prepped with chlorhexidine 18gauge needle inserted into joint space 30cc of synovial fluid aspirated.

## 2021-06-17 NOTE — ED PROVIDER NOTE - CLINICAL SUMMARY MEDICAL DECISION MAKING FREE TEXT BOX
55F PMH asthma, b/l knee replacements, presents for operative management. Pt has increasing pain/swelling to R knee for several mos. Following w/ Dr. Fay. Saw Dr. Fay ~10d ago, referred to ED today for operative management. Pt has no new complaints since visit w/ Dr. Fay. No other systemic symptoms. Vitals wnl, exam as above.  ddx: ?chronic knee infection  pre-op labs/ekg/cxr.   d/w ortho, will admit for further care.

## 2021-06-17 NOTE — H&P ADULT - ATTENDING COMMENTS
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 Primary right TKA in 2019. We first met in early 2020 at which time the left knee was more symptomatic. 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 She was lost to followup over the pandemic and she presented back again last week with gross loosening of the right knee. Right knee aspiration at that time was positive for infection based on cell counts and alpha-defensin assay. So far, the cultures are negative. I brought her in today for a right knee explantation and insertion of static antibiotic cement spacer tomorrow.    The aspiration from today of the left knee is also concerning for possible infection. I will include a reaspiration and repeat alpha-defensin assay on the left knee at the time of right knee explant tomorrow. She is aware that this may necessitate a similar explant/spacer procedure this admission for the left knee, if positive.

## 2021-06-17 NOTE — H&P ADULT - HISTORY OF PRESENT ILLNESS
56yo female s/p R TKA in 2019 at [facility] and L TKA in 2015 at [facility] pt states she has been having worsening atraumatic right knee pain with difficulty bearing weight starting in may.  Pt states that she takes oxycodone for her pain with some relief.  Pt states that prior to increase of her pain in May 2021 she has been able to ambulate without assistive device but since the progression of her pain she requires the use of crutches to ambulate.  Pt states that she has also had left knee pain that is a result of hardware loosening and states that her right knee pain is worse than her left knee pain at this time.  Pt denies any recent fever, chills, infections, antibiotic use, recent trauma, numbness or tingling down le b/l.

## 2021-06-17 NOTE — ED ADULT TRIAGE NOTE - MODE OF ARRIVAL
Moisés Cortez is a 66year old female. Patient presents with:  Ear Wax: bilateral -- States left ear is worse. Denies pain. HISTORY OF PRESENT ILLNESS    4/8/16  Here for evaluation of  bilateral hearing loss.  Patient feels this has worsened over t arthritis ([facility] [address]    • Cancer ([facility] [address]      melanoma on back         Past Surgical History    TEAR DUCT SYSTEM SURG UNLISTED  1968    HYSTERECTOMY      CATARACT      CHOLECYSTECTOMY      HIP REPLACEMENT SURGERY Right     KNEE REPLACEMENT SURGERY      Comment to operating microscope. Cerumen impaction was removed from bilateral ears using suction. Tympanic membranes were noted to be normal. Patient tolerated the procedure well. All questions were answered.       Current outpatient prescriptions:   •  Niacin ER, An 2 MG Oral Cap, , Disp: , Rfl:   •  folic acid 1 MG Oral Tab, Take one daily. , Disp: 90 tablet, Rfl: 3  •  acetaminophen (TYLENOL EXTRA STRENGTH) 500 MG Oral Tab, Take 500 mg by mouth every 6 (six) hours as needed. , Disp: , Rfl:   •  hydrochlorothiazide (HY Walk in

## 2021-06-17 NOTE — H&P ADULT - ASSESSMENT
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   - Admit  - Medicine consulted for preop clearance  - b/l knees aspirated will f/u cultures and gram stains  - plan for OR tomorrow for R knee explant antibiotic spacer implant   - pain control prn  - hold chemical ac preop  - NPO/IVF at midnight  - ID and PM consulted will appreciate recs   - WBAT b/l le   - Dispo pending OR

## 2021-06-18 ENCOUNTER — RESULT REVIEW (OUTPATIENT)
Age: 56
End: 2021-06-18

## 2021-06-18 ENCOUNTER — APPOINTMENT (OUTPATIENT)
Dept: ORTHOPEDIC SURGERY | Facility: HOSPITAL | Age: 56
End: 2021-06-18

## 2021-06-18 DIAGNOSIS — T84.59XA INFECTION AND INFLAMMATORY REACTION DUE TO OTHER INTERNAL JOINT PROSTHESIS, INITIAL ENCOUNTER: ICD-10-CM

## 2021-06-18 LAB
ANION GAP SERPL CALC-SCNC: 9 MMOL/L — SIGNIFICANT CHANGE UP (ref 5–17)
BUN SERPL-MCNC: 15 MG/DL — SIGNIFICANT CHANGE UP (ref 7–23)
CALCIUM SERPL-MCNC: 8.8 MG/DL — SIGNIFICANT CHANGE UP (ref 8.4–10.5)
CHLORIDE SERPL-SCNC: 104 MMOL/L — SIGNIFICANT CHANGE UP (ref 96–108)
CO2 SERPL-SCNC: 28 MMOL/L — SIGNIFICANT CHANGE UP (ref 22–31)
COVID-19 SPIKE DOMAIN AB INTERP: POSITIVE
COVID-19 SPIKE DOMAIN ANTIBODY RESULT: >250 U/ML — HIGH
CREAT SERPL-MCNC: 0.76 MG/DL — SIGNIFICANT CHANGE UP (ref 0.5–1.3)
GLUCOSE SERPL-MCNC: 90 MG/DL — SIGNIFICANT CHANGE UP (ref 70–99)
GRAM STN FLD: SIGNIFICANT CHANGE UP
HCT VFR BLD CALC: 37.6 % — SIGNIFICANT CHANGE UP (ref 34.5–45)
HCV AB S/CO SERPL IA: 67.39 S/CO — HIGH
HCV AB SERPL-IMP: REACTIVE
HGB BLD-MCNC: 11.5 G/DL — SIGNIFICANT CHANGE UP (ref 11.5–15.5)
MCHC RBC-ENTMCNC: 28.3 PG — SIGNIFICANT CHANGE UP (ref 27–34)
MCHC RBC-ENTMCNC: 30.6 GM/DL — LOW (ref 32–36)
MCV RBC AUTO: 92.4 FL — SIGNIFICANT CHANGE UP (ref 80–100)
NRBC # BLD: 0 /100 WBCS — SIGNIFICANT CHANGE UP (ref 0–0)
PLATELET # BLD AUTO: 406 K/UL — HIGH (ref 150–400)
POTASSIUM SERPL-MCNC: 4.8 MMOL/L — SIGNIFICANT CHANGE UP (ref 3.5–5.3)
POTASSIUM SERPL-SCNC: 4.8 MMOL/L — SIGNIFICANT CHANGE UP (ref 3.5–5.3)
RBC # BLD: 4.07 M/UL — SIGNIFICANT CHANGE UP (ref 3.8–5.2)
RBC # FLD: 13.4 % — SIGNIFICANT CHANGE UP (ref 10.3–14.5)
SARS-COV-2 IGG+IGM SERPL QL IA: >250 U/ML — HIGH
SARS-COV-2 IGG+IGM SERPL QL IA: POSITIVE
SODIUM SERPL-SCNC: 141 MMOL/L — SIGNIFICANT CHANGE UP (ref 135–145)
SPECIMEN SOURCE: SIGNIFICANT CHANGE UP
WBC # BLD: 6.44 K/UL — SIGNIFICANT CHANGE UP (ref 3.8–10.5)
WBC # FLD AUTO: 6.44 K/UL — SIGNIFICANT CHANGE UP (ref 3.8–10.5)

## 2021-06-18 PROCEDURE — 11981 INSERTION DRUG DLVR IMPLANT: CPT | Mod: RT

## 2021-06-18 PROCEDURE — 20610 DRAIN/INJ JOINT/BURSA W/O US: CPT | Mod: 59,LT

## 2021-06-18 PROCEDURE — 27488 REMOVAL OF KNEE PROSTHESIS: CPT | Mod: RT

## 2021-06-18 PROCEDURE — 73560 X-RAY EXAM OF KNEE 1 OR 2: CPT | Mod: 26,RT

## 2021-06-18 PROCEDURE — 88304 TISSUE EXAM BY PATHOLOGIST: CPT | Mod: 26

## 2021-06-18 PROCEDURE — 88305 TISSUE EXAM BY PATHOLOGIST: CPT | Mod: 26

## 2021-06-18 PROCEDURE — 88311 DECALCIFY TISSUE: CPT | Mod: 26

## 2021-06-18 PROCEDURE — 99233 SBSQ HOSP IP/OBS HIGH 50: CPT | Mod: GC

## 2021-06-18 RX ORDER — MAGNESIUM HYDROXIDE 400 MG/1
30 TABLET, CHEWABLE ORAL DAILY
Refills: 0 | Status: DISCONTINUED | OUTPATIENT
Start: 2021-06-18 | End: 2021-06-25

## 2021-06-18 RX ORDER — BUPIVACAINE 13.3 MG/ML
20 INJECTION, SUSPENSION, LIPOSOMAL INFILTRATION ONCE
Refills: 0 | Status: DISCONTINUED | OUTPATIENT
Start: 2021-06-18 | End: 2021-06-18

## 2021-06-18 RX ORDER — CEFEPIME 1 G/1
INJECTION, POWDER, FOR SOLUTION INTRAMUSCULAR; INTRAVENOUS
Refills: 0 | Status: DISCONTINUED | OUTPATIENT
Start: 2021-06-18 | End: 2021-06-21

## 2021-06-18 RX ORDER — VANCOMYCIN HCL 1 G
VIAL (EA) INTRAVENOUS
Refills: 0 | Status: DISCONTINUED | OUTPATIENT
Start: 2021-06-18 | End: 2021-06-20

## 2021-06-18 RX ORDER — SODIUM CHLORIDE 9 MG/ML
1000 INJECTION, SOLUTION INTRAVENOUS
Refills: 0 | Status: DISCONTINUED | OUTPATIENT
Start: 2021-06-18 | End: 2021-06-18

## 2021-06-18 RX ORDER — SENNA PLUS 8.6 MG/1
2 TABLET ORAL AT BEDTIME
Refills: 0 | Status: DISCONTINUED | OUTPATIENT
Start: 2021-06-18 | End: 2021-06-25

## 2021-06-18 RX ORDER — METHOCARBAMOL 500 MG/1
500 TABLET, FILM COATED ORAL EVERY 8 HOURS
Refills: 0 | Status: DISCONTINUED | OUTPATIENT
Start: 2021-06-18 | End: 2021-06-25

## 2021-06-18 RX ORDER — VANCOMYCIN HCL 1 G
1250 VIAL (EA) INTRAVENOUS ONCE
Refills: 0 | Status: COMPLETED | OUTPATIENT
Start: 2021-06-18 | End: 2021-06-18

## 2021-06-18 RX ORDER — VANCOMYCIN HCL 1 G
1250 VIAL (EA) INTRAVENOUS EVERY 12 HOURS
Refills: 0 | Status: DISCONTINUED | OUTPATIENT
Start: 2021-06-19 | End: 2021-06-20

## 2021-06-18 RX ORDER — ASPIRIN/CALCIUM CARB/MAGNESIUM 324 MG
81 TABLET ORAL
Refills: 0 | Status: DISCONTINUED | OUTPATIENT
Start: 2021-06-19 | End: 2021-06-25

## 2021-06-18 RX ORDER — HYDROMORPHONE HYDROCHLORIDE 2 MG/ML
0.5 INJECTION INTRAMUSCULAR; INTRAVENOUS; SUBCUTANEOUS EVERY 4 HOURS
Refills: 0 | Status: DISCONTINUED | OUTPATIENT
Start: 2021-06-18 | End: 2021-06-25

## 2021-06-18 RX ORDER — KETOROLAC TROMETHAMINE 30 MG/ML
15 SYRINGE (ML) INJECTION EVERY 6 HOURS
Refills: 0 | Status: DISCONTINUED | OUTPATIENT
Start: 2021-06-18 | End: 2021-06-20

## 2021-06-18 RX ORDER — OXYCODONE HYDROCHLORIDE 5 MG/1
5 TABLET ORAL EVERY 4 HOURS
Refills: 0 | Status: DISCONTINUED | OUTPATIENT
Start: 2021-06-18 | End: 2021-06-22

## 2021-06-18 RX ORDER — HYDROMORPHONE HYDROCHLORIDE 2 MG/ML
0.5 INJECTION INTRAMUSCULAR; INTRAVENOUS; SUBCUTANEOUS
Refills: 0 | Status: DISCONTINUED | OUTPATIENT
Start: 2021-06-18 | End: 2021-06-25

## 2021-06-18 RX ORDER — SODIUM CHLORIDE 9 MG/ML
1000 INJECTION, SOLUTION INTRAVENOUS
Refills: 0 | Status: DISCONTINUED | OUTPATIENT
Start: 2021-06-19 | End: 2021-06-22

## 2021-06-18 RX ORDER — VANCOMYCIN HCL 1 G
1250 VIAL (EA) INTRAVENOUS EVERY 12 HOURS
Refills: 0 | Status: DISCONTINUED | OUTPATIENT
Start: 2021-06-18 | End: 2021-06-18

## 2021-06-18 RX ORDER — OXYCODONE HYDROCHLORIDE 5 MG/1
10 TABLET ORAL EVERY 4 HOURS
Refills: 0 | Status: DISCONTINUED | OUTPATIENT
Start: 2021-06-18 | End: 2021-06-22

## 2021-06-18 RX ORDER — FOLIC ACID 0.8 MG
1 TABLET ORAL DAILY
Refills: 0 | Status: DISCONTINUED | OUTPATIENT
Start: 2021-06-18 | End: 2021-06-25

## 2021-06-18 RX ORDER — CEFEPIME 1 G/1
2000 INJECTION, POWDER, FOR SOLUTION INTRAMUSCULAR; INTRAVENOUS EVERY 8 HOURS
Refills: 0 | Status: DISCONTINUED | OUTPATIENT
Start: 2021-06-18 | End: 2021-06-18

## 2021-06-18 RX ORDER — CELECOXIB 200 MG/1
200 CAPSULE ORAL EVERY 12 HOURS
Refills: 0 | Status: DISCONTINUED | OUTPATIENT
Start: 2021-06-19 | End: 2021-06-25

## 2021-06-18 RX ORDER — ONDANSETRON 8 MG/1
4 TABLET, FILM COATED ORAL EVERY 6 HOURS
Refills: 0 | Status: DISCONTINUED | OUTPATIENT
Start: 2021-06-18 | End: 2021-06-25

## 2021-06-18 RX ORDER — CEFEPIME 1 G/1
2000 INJECTION, POWDER, FOR SOLUTION INTRAMUSCULAR; INTRAVENOUS EVERY 8 HOURS
Refills: 0 | Status: DISCONTINUED | OUTPATIENT
Start: 2021-06-19 | End: 2021-06-21

## 2021-06-18 RX ORDER — ACETAMINOPHEN 500 MG
975 TABLET ORAL EVERY 8 HOURS
Refills: 0 | Status: DISCONTINUED | OUTPATIENT
Start: 2021-06-18 | End: 2021-06-25

## 2021-06-18 RX ORDER — FAMOTIDINE 10 MG/ML
20 INJECTION INTRAVENOUS EVERY 12 HOURS
Refills: 0 | Status: DISCONTINUED | OUTPATIENT
Start: 2021-06-18 | End: 2021-06-25

## 2021-06-18 RX ORDER — CEFEPIME 1 G/1
2000 INJECTION, POWDER, FOR SOLUTION INTRAMUSCULAR; INTRAVENOUS ONCE
Refills: 0 | Status: COMPLETED | OUTPATIENT
Start: 2021-06-18 | End: 2021-06-18

## 2021-06-18 RX ADMIN — Medication 1 APPLICATION(S): at 12:34

## 2021-06-18 RX ADMIN — Medication 15 MILLIGRAM(S): at 23:05

## 2021-06-18 RX ADMIN — SODIUM CHLORIDE 120 MILLILITER(S): 9 INJECTION, SOLUTION INTRAVENOUS at 01:20

## 2021-06-18 RX ADMIN — ONDANSETRON 4 MILLIGRAM(S): 8 TABLET, FILM COATED ORAL at 00:32

## 2021-06-18 RX ADMIN — CELECOXIB 400 MILLIGRAM(S): 200 CAPSULE ORAL at 12:26

## 2021-06-18 RX ADMIN — CHLORHEXIDINE GLUCONATE 1 APPLICATION(S): 213 SOLUTION TOPICAL at 12:26

## 2021-06-18 RX ADMIN — Medication 1000 MILLIGRAM(S): at 12:40

## 2021-06-18 RX ADMIN — METHOCARBAMOL 500 MILLIGRAM(S): 500 TABLET, FILM COATED ORAL at 23:25

## 2021-06-18 RX ADMIN — GABAPENTIN 600 MILLIGRAM(S): 400 CAPSULE ORAL at 12:46

## 2021-06-18 RX ADMIN — HYDROMORPHONE HYDROCHLORIDE 0.5 MILLIGRAM(S): 2 INJECTION INTRAMUSCULAR; INTRAVENOUS; SUBCUTANEOUS at 22:47

## 2021-06-18 RX ADMIN — CELECOXIB 400 MILLIGRAM(S): 200 CAPSULE ORAL at 12:40

## 2021-06-18 RX ADMIN — SODIUM CHLORIDE 120 MILLILITER(S): 9 INJECTION, SOLUTION INTRAVENOUS at 11:49

## 2021-06-18 RX ADMIN — Medication 1000 MILLIGRAM(S): at 12:26

## 2021-06-18 RX ADMIN — APREPITANT 40 MILLIGRAM(S): 80 CAPSULE ORAL at 12:26

## 2021-06-18 NOTE — BRIEF OPERATIVE NOTE - NSICDXBRIEFPREOP_GEN_ALL_CORE_FT
PRE-OP DIAGNOSIS:  Infection of prosthetic knee joint, initial encounter 18-Jun-2021 20:59:33  Kosta Adames

## 2021-06-18 NOTE — PROGRESS NOTE ADULT - SUBJECTIVE AND OBJECTIVE BOX
Ortho Note    Pt comfortable without complaints, pain controlled  Denies CP, SOB, N/V, numbness/tingling     Vital Signs Last 24 Hrs  T(C): 36.5 (06-18-21 @ 05:00), Max: 36.5 (06-18-21 @ 05:00)  T(F): 97.7 (06-18-21 @ 05:00), Max: 97.7 (06-18-21 @ 05:00)  HR: 83 (06-18-21 @ 05:00) (83 - 83)  BP: 102/72 (06-18-21 @ 05:00) (102/72 - 102/72)  BP(mean): 85 (06-18-21 @ 05:00) (85 - 85)  RR: 17 (06-18-21 @ 05:00) (17 - 17)  SpO2: 99% (06-18-21 @ 05:00) (99% - 99%)      General: Pt Alert and oriented, NAD  Ace bandages in place   Pulses: palpable DP pulses b/l   Sensation: SILT throughout b/l  Motor: 5/5 EHL/FHL/TA/GS      55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   - Medicine consulted for preop clearance  - b/l knees aspirated will f/u cultures and gram stains  - plan for OR tomorrow for R knee explant antibiotic spacer implant   - pain control prn  - hold chemical ac preop  - NPO/IVF   - ID and PM consulted will appreciate recs   - WBAT b/l le   - Dispo pending OR       Ortho Pager [phone]

## 2021-06-18 NOTE — BRIEF OPERATIVE NOTE - NSICDXBRIEFPROCEDURE_GEN_ALL_CORE_FT
PROCEDURES:  Open replacement of right knee using articulating spacer 18-Jun-2021 20:59:55  Kosta Adames

## 2021-06-18 NOTE — PROGRESS NOTE ADULT - SUBJECTIVE AND OBJECTIVE BOX
Subjective/Interval events:  -No events overnight  -This am patient reports stable b/l knee pain well controlled on current pain regimen  -We discussed hepc ab- patient with hx of being fully treated in the past    MEDICATIONS  (STANDING):  BUpivacaine liposome 1.3% Injectable (no eMAR) 20 milliLiter(s) Local Injection once  cefepime   IVPB 2000 milliGRAM(s) IV Intermittent every 8 hours  lactated ringers. 1000 milliLiter(s) (120 mL/Hr) IV Continuous <Continuous>  vancomycin  IVPB 1250 milliGRAM(s) IV Intermittent every 12 hours    MEDICATIONS  (PRN):  magnesium hydroxide Suspension 30 milliLiter(s) Oral daily PRN Constipation  ondansetron Injectable 4 milliGRAM(s) IV Push every 4 hours PRN Nausea and/or Vomiting  oxyCODONE    IR 30 milliGRAM(s) Oral every 6 hours PRN Severe Pain (7 - 10)      Vital Signs Last 24 Hrs  T(C): 36.9 (18 Jun 2021 09:06), Max: 36.9 (18 Jun 2021 05:09)  T(F): 98.5 (18 Jun 2021 09:06), Max: 98.5 (18 Jun 2021 05:09)  HR: 75 (18 Jun 2021 09:06) (75 - 83)  BP: 107/80 (18 Jun 2021 09:06) (102/63 - 107/80)  BP(mean): 85 (18 Jun 2021 05:00) (85 - 85)  RR: 16 (18 Jun 2021 09:06) (16 - 17)  SpO2: 97% (18 Jun 2021 09:06) (97% - 100%)    PHYSICAL EXAM:  GENERAL: pleasant, NAD  NEURO: Aox3, intact strength/sensation all 4 ext   HEENT: clear op, mmm  NECK:  No JVD, no lymphadenopathy  CHEST/LUNG: Clear to auscultation bilaterally; No rales, rhonchi, wheezing. Normal work of breathing, not tachypneic  HEART: Regular rate and rhythm; No murmurs, rubs, or gallops  ABDOMEN: Soft, Nontender, Nondistended. Normoactive bowel sounds  EXTREMITIES:  b/l knees wrapped, no sig le edema     LABS (reviewed)   CBC and BMP stable, gram stain from arthrocentesis NG  Hepc ab positive     ASSESSMENT AND PLAN: 54yo female with hx of asthma, L knee septic arthritis 2012, L TKA 2015, R TKA 3/2019, presenting with worsening b/l knee pain and concern for R knee infection.  Medicine managing perioperatively  #R knee infection, pending OR likely today- see prior note for preop eval, appreciate ID recs on abx s/p OR cx, keep off abx for now   #Chronic b/l knee pain- on high dose opioid regimen at home- f/u pain management recs   #Asthma hx- clear lung exam, patient does not take inhalers at this time   #Hep C ab positive- f/u VL, likely from treated prior infection per patient    #DVT px: per primary team  #Diet: regular diet    Patient was seen and examined by me at bedside    Greater than 35 minutes spent on total encounter; more than 50% of the visit was spent counseling and/or coordinating care by the attending physician.    Aris Mandel MD [phone]  Subjective/Interval events:  -No events overnight  -This am patient reports stable b/l knee pain well controlled on current pain regimen  -We discussed hepc ab- patient with hx of being fully treated in the past    MEDICATIONS  (STANDING):  BUpivacaine liposome 1.3% Injectable (no eMAR) 20 milliLiter(s) Local Injection once  cefepime   IVPB 2000 milliGRAM(s) IV Intermittent every 8 hours  lactated ringers. 1000 milliLiter(s) (120 mL/Hr) IV Continuous <Continuous>  vancomycin  IVPB 1250 milliGRAM(s) IV Intermittent every 12 hours    MEDICATIONS  (PRN):  magnesium hydroxide Suspension 30 milliLiter(s) Oral daily PRN Constipation  ondansetron Injectable 4 milliGRAM(s) IV Push every 4 hours PRN Nausea and/or Vomiting  oxyCODONE    IR 30 milliGRAM(s) Oral every 6 hours PRN Severe Pain (7 - 10)      Vital Signs Last 24 Hrs  T(C): 36.9 (18 Jun 2021 09:06), Max: 36.9 (18 Jun 2021 05:09)  T(F): 98.5 (18 Jun 2021 09:06), Max: 98.5 (18 Jun 2021 05:09)  HR: 75 (18 Jun 2021 09:06) (75 - 83)  BP: 107/80 (18 Jun 2021 09:06) (102/63 - 107/80)  BP(mean): 85 (18 Jun 2021 05:00) (85 - 85)  RR: 16 (18 Jun 2021 09:06) (16 - 17)  SpO2: 97% (18 Jun 2021 09:06) (97% - 100%)    PHYSICAL EXAM:  GENERAL: pleasant, NAD  NEURO: Aox3, intact strength/sensation all 4 ext   HEENT: clear op, mmm  NECK:  No JVD, no lymphadenopathy  CHEST/LUNG: Clear to auscultation bilaterally; No rales, rhonchi, wheezing. Normal work of breathing, not tachypneic  HEART: Regular rate and rhythm; No murmurs, rubs, or gallops  ABDOMEN: Soft, Nontender, Nondistended. Normoactive bowel sounds  EXTREMITIES:  b/l knees wrapped, no sig le edema     LABS (reviewed)   CBC and BMP stable, gram stain from arthrocentesis NG  Hepc ab positive     ASSESSMENT AND PLAN: 54yo female with hx of asthma, L knee septic arthritis 2012, L TKA 2015, R TKA 3/2019, presenting with worsening b/l knee pain and concern for R knee infection with plan for R knee washout and antibiotic spacer implant. Medicine managing perioperatively  #R knee infection, pending OR likely today 6/18- see my prior note for preop eval, appreciate ID recs on abx s/p OR cx,  f/u arthrocentesis cx, keep off abx for now   #Chronic b/l knee pain- on high dose opioid regimen at home- f/u pain management recs   #Asthma hx- clear lung exam, patient does not take inhalers at this time   #Hep C ab positive- f/u VL, likely from treated prior infection per patient    #DVT px: per primary team  #Diet: NPO     Patient was seen and examined by me at bedside    Greater than 35 minutes spent on total encounter; more than 50% of the visit was spent counseling and/or coordinating care by the attending physician.    Aris Mandel MD [phone]

## 2021-06-18 NOTE — PROGRESS NOTE ADULT - SUBJECTIVE AND OBJECTIVE BOX
Ortho Note      Subjective:  Pt comfortable without complaints, pain controlled with current pain medication regimen. Patient NPO for OR today.   Denies CP, SOB, N/V, numbness/tingling, afebrile.       Vital Signs Last 24 Hrs  T(C): 36.9 (06-18-21 @ 09:06), Max: 36.9 (06-18-21 @ 09:06)  T(F): 98.5 (06-18-21 @ 09:06), Max: 98.5 (06-18-21 @ 09:06)  HR: 75 (06-18-21 @ 09:06) (75 - 83)  BP: 107/80 (06-18-21 @ 09:06) (102/72 - 107/80)  BP(mean): 85 (06-18-21 @ 05:00) (85 - 85)  RR: 16 (06-18-21 @ 09:06) (16 - 17)  SpO2: 97% (06-18-21 @ 09:06) (97% - 99%)  AVSS    Objective:    Physical Exam:  General: Pt Alert and oriented, NAD  bilateral knee DSG C/D/I, wrapped with ace bandage  Pulses: +2 pedal pulses, wwp toes, cap refill less than 3 seconds  Sensation: SILT intact  Motor: EHL/FHL/TA/GS- firing        Plan of Care:  A/P: 55yFemale POD# s/p   - afebrile, nontoxic apperance  - Pain Control- Oxycodone 5-10mg PO Q3h prn moderate to severe pain, Dilaudid 0.5mg Q4h prn breakthrough pain, methocarbamol 500mg PO TID, gabapentin 100mg PO TID, tylenol 1000mg PO Q8h,   - DVT ppx:  - PT, WBS:     Ortho Pager [phone] Ortho Note      Subjective:  Pt comfortable without complaints, pain controlled with current pain medication regimen. Patient NPO for OR today.   Denies CP, SOB, N/V, numbness/tingling, afebrile.       Vital Signs Last 24 Hrs  T(C): 36.9 (06-18-21 @ 09:06), Max: 36.9 (06-18-21 @ 09:06)  T(F): 98.5 (06-18-21 @ 09:06), Max: 98.5 (06-18-21 @ 09:06)  HR: 75 (06-18-21 @ 09:06) (75 - 83)  BP: 107/80 (06-18-21 @ 09:06) (102/72 - 107/80)  BP(mean): 85 (06-18-21 @ 05:00) (85 - 85)  RR: 16 (06-18-21 @ 09:06) (16 - 17)  SpO2: 97% (06-18-21 @ 09:06) (97% - 99%)  AVSS      Objective:    Physical Exam:  General: Pt Alert and oriented, NAD  bilateral knee DSG C/D/I, wrapped with ace bandage  Pulses: +2 pedal pulses, wwp toes, cap refill less than 3 seconds  Sensation: SILT intact  Motor: EHL/FHL/TA/GS- firing        Plan of Care:  A/P: 55yFemale admitted with R infected TKR, Left infected TKR, plan for OR for Left explant and spacer  - afebrile, nontoxic appearance  - Pain Control- Oxycodone 5-10mg PO Q3h prn moderate to severe pain, Dilaudid 0.5mg Q4h prn breakthrough pain, methocarbamol 500mg PO TID, gabapentin 100mg PO TID, tylenol 1000mg PO Q8h,   - DVT ppx: SCDS  - PT, WBS: WBAT  - Appreciate ID Recs  - Appreciate Medicine Recs  - NPO LR @ 100 ml/hour  - Start Vanco IV and Cefepime IV after OR, vanco trough before the 4th dose (to start 6-18)   - followup cultures  - Dispo: OR for Left Knee explant and spacer    Ortho Pager [phone]

## 2021-06-18 NOTE — BRIEF OPERATIVE NOTE - NSICDXBRIEFPOSTOP_GEN_ALL_CORE_FT
POST-OP DIAGNOSIS:  Infection of prosthetic knee joint, initial encounter 18-Jun-2021 20:59:41  Kosta Adames

## 2021-06-19 LAB
ALBUMIN SERPL ELPH-MCNC: 3.1 G/DL — LOW (ref 3.3–5)
ALP SERPL-CCNC: 99 U/L — SIGNIFICANT CHANGE UP (ref 40–120)
ALT FLD-CCNC: 10 U/L — SIGNIFICANT CHANGE UP (ref 10–45)
ANION GAP SERPL CALC-SCNC: 9 MMOL/L — SIGNIFICANT CHANGE UP (ref 5–17)
AST SERPL-CCNC: 15 U/L — SIGNIFICANT CHANGE UP (ref 10–40)
BILIRUB SERPL-MCNC: 0.3 MG/DL — SIGNIFICANT CHANGE UP (ref 0.2–1.2)
BUN SERPL-MCNC: 11 MG/DL — SIGNIFICANT CHANGE UP (ref 7–23)
CALCIUM SERPL-MCNC: 8.6 MG/DL — SIGNIFICANT CHANGE UP (ref 8.4–10.5)
CHLORIDE SERPL-SCNC: 100 MMOL/L — SIGNIFICANT CHANGE UP (ref 96–108)
CO2 SERPL-SCNC: 25 MMOL/L — SIGNIFICANT CHANGE UP (ref 22–31)
CREAT SERPL-MCNC: 0.65 MG/DL — SIGNIFICANT CHANGE UP (ref 0.5–1.3)
CRP SERPL-MCNC: 31.6 MG/L — HIGH (ref 0–4)
ERYTHROCYTE [SEDIMENTATION RATE] IN BLOOD: 38 MM/HR — HIGH
GLUCOSE SERPL-MCNC: 142 MG/DL — HIGH (ref 70–99)
HCT VFR BLD CALC: 37.9 % — SIGNIFICANT CHANGE UP (ref 34.5–45)
HGB BLD-MCNC: 11.7 G/DL — SIGNIFICANT CHANGE UP (ref 11.5–15.5)
MCHC RBC-ENTMCNC: 27.6 PG — SIGNIFICANT CHANGE UP (ref 27–34)
MCHC RBC-ENTMCNC: 30.9 GM/DL — LOW (ref 32–36)
MCV RBC AUTO: 89.4 FL — SIGNIFICANT CHANGE UP (ref 80–100)
NIGHT BLUE STAIN TISS: SIGNIFICANT CHANGE UP
NRBC # BLD: 0 /100 WBCS — SIGNIFICANT CHANGE UP (ref 0–0)
PLATELET # BLD AUTO: 441 K/UL — HIGH (ref 150–400)
POTASSIUM SERPL-MCNC: 4.4 MMOL/L — SIGNIFICANT CHANGE UP (ref 3.5–5.3)
POTASSIUM SERPL-SCNC: 4.4 MMOL/L — SIGNIFICANT CHANGE UP (ref 3.5–5.3)
PROT SERPL-MCNC: 6.7 G/DL — SIGNIFICANT CHANGE UP (ref 6–8.3)
RBC # BLD: 4.24 M/UL — SIGNIFICANT CHANGE UP (ref 3.8–5.2)
RBC # FLD: 13 % — SIGNIFICANT CHANGE UP (ref 10.3–14.5)
SODIUM SERPL-SCNC: 134 MMOL/L — LOW (ref 135–145)
SPECIMEN SOURCE: SIGNIFICANT CHANGE UP
WBC # BLD: 10.19 K/UL — SIGNIFICANT CHANGE UP (ref 3.8–10.5)
WBC # FLD AUTO: 10.19 K/UL — SIGNIFICANT CHANGE UP (ref 3.8–10.5)

## 2021-06-19 PROCEDURE — 99233 SBSQ HOSP IP/OBS HIGH 50: CPT

## 2021-06-19 PROCEDURE — 99232 SBSQ HOSP IP/OBS MODERATE 35: CPT

## 2021-06-19 RX ADMIN — METHOCARBAMOL 500 MILLIGRAM(S): 500 TABLET, FILM COATED ORAL at 15:15

## 2021-06-19 RX ADMIN — OXYCODONE HYDROCHLORIDE 10 MILLIGRAM(S): 5 TABLET ORAL at 21:54

## 2021-06-19 RX ADMIN — Medication 15 MILLIGRAM(S): at 06:55

## 2021-06-19 RX ADMIN — Medication 15 MILLIGRAM(S): at 19:41

## 2021-06-19 RX ADMIN — Medication 975 MILLIGRAM(S): at 06:55

## 2021-06-19 RX ADMIN — Medication 15 MILLIGRAM(S): at 18:35

## 2021-06-19 RX ADMIN — FAMOTIDINE 20 MILLIGRAM(S): 10 INJECTION INTRAVENOUS at 18:35

## 2021-06-19 RX ADMIN — Medication 1 MILLIGRAM(S): at 11:15

## 2021-06-19 RX ADMIN — CELECOXIB 200 MILLIGRAM(S): 200 CAPSULE ORAL at 06:19

## 2021-06-19 RX ADMIN — CELECOXIB 200 MILLIGRAM(S): 200 CAPSULE ORAL at 18:35

## 2021-06-19 RX ADMIN — HYDROMORPHONE HYDROCHLORIDE 0.5 MILLIGRAM(S): 2 INJECTION INTRAMUSCULAR; INTRAVENOUS; SUBCUTANEOUS at 23:35

## 2021-06-19 RX ADMIN — Medication 15 MILLIGRAM(S): at 11:15

## 2021-06-19 RX ADMIN — Medication 975 MILLIGRAM(S): at 23:41

## 2021-06-19 RX ADMIN — CEFEPIME 100 MILLIGRAM(S): 1 INJECTION, POWDER, FOR SOLUTION INTRAMUSCULAR; INTRAVENOUS at 14:34

## 2021-06-19 RX ADMIN — Medication 15 MILLIGRAM(S): at 06:13

## 2021-06-19 RX ADMIN — SODIUM CHLORIDE 100 MILLILITER(S): 9 INJECTION, SOLUTION INTRAVENOUS at 00:51

## 2021-06-19 RX ADMIN — CELECOXIB 200 MILLIGRAM(S): 200 CAPSULE ORAL at 06:55

## 2021-06-19 RX ADMIN — Medication 166.67 MILLIGRAM(S): at 18:34

## 2021-06-19 RX ADMIN — OXYCODONE HYDROCHLORIDE 10 MILLIGRAM(S): 5 TABLET ORAL at 20:39

## 2021-06-19 RX ADMIN — Medication 975 MILLIGRAM(S): at 22:55

## 2021-06-19 RX ADMIN — SENNA PLUS 2 TABLET(S): 8.6 TABLET ORAL at 22:38

## 2021-06-19 RX ADMIN — CELECOXIB 200 MILLIGRAM(S): 200 CAPSULE ORAL at 19:41

## 2021-06-19 RX ADMIN — Medication 81 MILLIGRAM(S): at 06:16

## 2021-06-19 RX ADMIN — Medication 975 MILLIGRAM(S): at 14:41

## 2021-06-19 RX ADMIN — Medication 975 MILLIGRAM(S): at 14:35

## 2021-06-19 RX ADMIN — CEFEPIME 100 MILLIGRAM(S): 1 INJECTION, POWDER, FOR SOLUTION INTRAMUSCULAR; INTRAVENOUS at 22:38

## 2021-06-19 RX ADMIN — Medication 975 MILLIGRAM(S): at 06:14

## 2021-06-19 RX ADMIN — Medication 166.67 MILLIGRAM(S): at 07:32

## 2021-06-19 RX ADMIN — FAMOTIDINE 20 MILLIGRAM(S): 10 INJECTION INTRAVENOUS at 06:17

## 2021-06-19 RX ADMIN — Medication 15 MILLIGRAM(S): at 11:18

## 2021-06-19 RX ADMIN — Medication 1 TABLET(S): at 11:15

## 2021-06-19 RX ADMIN — ONDANSETRON 4 MILLIGRAM(S): 8 TABLET, FILM COATED ORAL at 20:39

## 2021-06-19 RX ADMIN — Medication 81 MILLIGRAM(S): at 18:35

## 2021-06-19 RX ADMIN — HYDROMORPHONE HYDROCHLORIDE 0.5 MILLIGRAM(S): 2 INJECTION INTRAMUSCULAR; INTRAVENOUS; SUBCUTANEOUS at 22:38

## 2021-06-19 RX ADMIN — CEFEPIME 100 MILLIGRAM(S): 1 INJECTION, POWDER, FOR SOLUTION INTRAMUSCULAR; INTRAVENOUS at 06:16

## 2021-06-19 NOTE — PHYSICAL THERAPY INITIAL EVALUATION ADULT - GENERAL OBSERVATIONS, REHAB EVAL
Patient received semi-fernández in bed  in NAD on RA, +SCDs, +PIV, +KI, +Prevena. Cleared by ALEX Dillard. Agreeable to PT.

## 2021-06-19 NOTE — PROGRESS NOTE ADULT - SUBJECTIVE AND OBJECTIVE BOX
Ortho Post Op Check    Procedure: R TKA explant, antibiotic spacer  Surgeon: Oh    Pt comfortable without complaints, pain controlled. Denies CP, SOB, N/V, numbness/tingling     Vital Signs Last 24 Hrs  T(C): 36.3 (06-19-21 @ 00:00), Max: 36.3 (06-19-21 @ 00:00)  T(F): 97.4 (06-19-21 @ 00:00), Max: 97.4 (06-19-21 @ 00:00)  HR: 72 (06-19-21 @ 00:00) (72 - 90)  BP: 140/83 (06-19-21 @ 00:00) (137/83 - 153/75)  BP(mean): 107 (06-18-21 @ 23:26) (100 - 107)  RR: 16 (06-19-21 @ 00:00) (10 - 31)  SpO2: 97% (06-19-21 @ 00:00) (91% - 98%)    Physical Exam:  General: Resting comfortably in bed. AAOx3. NAD.  Extremities:        LLE: No gross deformity. SILT L2-S1 distribution, symmetric. TA/EHL/FHL/GS motor intact. WWP       RLE: Knee immobilizer in place. Prevena intact w/ good function. HVx1. SILT L2-S1 distribution, symmetric. TA/EHL/FHL/GS motor intact. WWP,       Post-op X-Ray:    A/P: 55yFemale POD#0 s/p R TKA explant, antibiotic spacer implant  - Stable  - Pain Control  - DVT ppx: ASA  - Post op abx: Vanc + cefepime  - Monitor drain o/p  - PT, WBS: 50% PWB w/ KI    Ortho Pager [phone]

## 2021-06-19 NOTE — PROGRESS NOTE ADULT - SUBJECTIVE AND OBJECTIVE BOX
Ortho Note    Procedure: R TKA explant, antibiotic spacer  Surgeon: Oh    Pt comfortable without complaints, pain controlled. Denies CP, SOB, N/V, numbness/tingling     Vital Signs Last 24 Hrs  T(C): 36.3 (19 Jun 2021 05:01), Max: 36.9 (18 Jun 2021 09:06)  T(F): 97.4 (19 Jun 2021 05:01), Max: 98.5 (18 Jun 2021 09:06)  HR: 82 (19 Jun 2021 05:01) (72 - 90)  BP: 127/81 (19 Jun 2021 05:01) (107/80 - 153/75)  BP(mean): 107 (18 Jun 2021 23:26) (100 - 107)  RR: 17 (19 Jun 2021 05:01) (10 - 31)  SpO2: 95% (19 Jun 2021 05:01) (91% - 98%)    Physical Exam:  General: Resting comfortably in bed. AAOx3. NAD.  Extremities:   LLE: No gross deformity. SILT L2-S1 distribution, symmetric. TA/EHL/FHL/GS motor intact. WWP       RLE: Knee immobilizer in place. Prevena intact w/ good function. HVx1. SILT L2-S1 distribution, symmetric. TA/EHL/FHL/GS motor intact. WWP,       Post-op X-Ray:    A/P: 55yFemale POD#1 s/p R TKA explant, antibiotic spacer implant  - Stable  - Pain Control  - DVT ppx: ASA  - Post op abx: Vanc + cefepime  - Monitor drain o/p  - PT, WBS: 50% PWB w/ KI    Ortho Pager [phone]

## 2021-06-19 NOTE — PHYSICAL THERAPY INITIAL EVALUATION ADULT - ADDITIONAL COMMENTS
Patient lives alone in apartment with 8 steps to enter. Pt states that prior to increase of her pain in May 2021 she has been able to ambulate without assistive device but since the progression of her pain she requires the use of crutches to ambulate.

## 2021-06-19 NOTE — PHYSICAL THERAPY INITIAL EVALUATION ADULT - PERTINENT HX OF CURRENT PROBLEM, REHAB EVAL
54yo female s/p R TKA in 2019 at [facility] and L TKA in 2015 at [facility] pt states she has been having worsening atraumatic right knee pain with difficulty bearing weight starting in may..  Pt states that she has also had left knee pain that is a result of hardware loosening and states that her right knee pain is worse than her left knee pain at this time.

## 2021-06-19 NOTE — PROGRESS NOTE ADULT - SUBJECTIVE AND OBJECTIVE BOX
INTERVAL EVENTS: No o/n events. Reports improved R knee pain s/p washout. To work w/ PT shortly. Denies CP, dyspnea, palpitations, presyncope, syncope, f/c/n/v.     REVIEW OF SYSTEMS:  Constitutional:     [X] negative [ ] fevers [ ] chills [ ] weight loss [ ] weight gain  HEENT:                  [X] negative [ ] dry eyes [ ] eye irritation [ ] postnasal drip [ ] nasal congestion  CV:                         [X] negative  [ ] chest pain [ ] orthopnea [ ] palpitations [ ] murmur  Resp:                     [X] negative [ ] cough [ ] shortness of breath [ ] wheezing [ ] sputum [ ] hemoptysis  GI:                          [X] negative [ ] nausea [ ] vomiting [ ] diarrhea [ ] constipation [ ] abd pain [ ] dysphagia   :                        [X] negative [ ] dysuria [ ] nocturia [ ] hematuria [ ] increased urinary frequency  MSK:                      [X] negative [ ] back pain [ ] myalgias [ ] arthralgias [ ] fracture  Skin:                       [X] negative [ ] rash [ ] itch  Neuro:                   [X] negative [ ] headache [ ] dizziness [ ] syncope [ ] weakness [ ] numbness  Psych:                    [X] negative [ ] anxiety [ ] depression  Endo:                     [X] negative [ ] diabetes [ ] thyroid problem  Heme/Lymph:      [X] negative [ ] anemia [ ] bleeding problem  Allergic/Immune: [X] negative [ ] itchy eyes [ ] nasal discharge [ ] hives [ ] angioedema    [X] All other systems negative or otherwise described above.  [ ] Unable to assess ROS because ________.    PAST MEDICAL & SURGICAL HISTORY:    MEDICATIONS  (STANDING):  acetaminophen   Tablet .. 975 milliGRAM(s) Oral every 8 hours  aspirin enteric coated 81 milliGRAM(s) Oral two times a day  cefepime   IVPB 2000 milliGRAM(s) IV Intermittent every 8 hours  cefepime   IVPB      celecoxib 200 milliGRAM(s) Oral every 12 hours  famotidine    Tablet 20 milliGRAM(s) Oral every 12 hours  folic acid 1 milliGRAM(s) Oral daily  ketorolac   Injectable 15 milliGRAM(s) IV Push every 6 hours  lactated ringers. 1000 milliLiter(s) (100 mL/Hr) IV Continuous <Continuous>  multivitamin 1 Tablet(s) Oral daily  senna 2 Tablet(s) Oral at bedtime  vancomycin  IVPB      vancomycin  IVPB 1250 milliGRAM(s) IV Intermittent every 12 hours    MEDICATIONS  (PRN):  HYDROmorphone  Injectable 0.5 milliGRAM(s) IV Push every 15 minutes PRN pacu  HYDROmorphone  Injectable 0.5 milliGRAM(s) IV Push every 4 hours PRN breakthrough  magnesium hydroxide Suspension 30 milliLiter(s) Oral daily PRN Constipation  methocarbamol 500 milliGRAM(s) Oral every 8 hours PRN Muscle Spasm  ondansetron Injectable 4 milliGRAM(s) IV Push every 6 hours PRN Nausea and/or Vomiting  oxyCODONE    IR 5 milliGRAM(s) Oral every 4 hours PRN Moderate Pain (4 - 6)  oxyCODONE    IR 10 milliGRAM(s) Oral every 4 hours PRN Severe Pain (7 - 10)    ICU Vital Signs Last 24 Hrs  T(C): 36.5 (19 Jun 2021 14:14), Max: 36.5 (19 Jun 2021 14:14)  T(F): 97.7 (19 Jun 2021 14:14), Max: 97.7 (19 Jun 2021 14:14)  HR: 98 (19 Jun 2021 14:14) (72 - 98)  BP: 111/66 (19 Jun 2021 14:14) (110/70 - 153/75)  BP(mean): 107 (18 Jun 2021 23:26) (100 - 107)  ABP: --  ABP(mean): --  RR: 17 (19 Jun 2021 14:14) (10 - 31)  SpO2: 95% (19 Jun 2021 14:14) (91% - 98%)    Daily     Daily     PHYSICAL EXAM:  GEN: Awake, alert. NAD.   HEENT: NCAT, PERRL, EOMI. Mucosa moist. No JVD.  RESP: CTA b/l  CV: RRR. Normal S1/S2. No m/r/g.  ABD: Soft. NT/ND. BS+  EXT: Warm. No edema, clubbing, or cyanosis. R LE in brace.   NEURO: AAOx3. No focal deficits.     LABS:                        11.7   10.19 )-----------( 441      ( 19 Jun 2021 08:42 )             37.9     06-19    134<L>  |  100  |  11  ----------------------------<  142<H>  4.4   |  25  |  0.65    Ca    8.6      19 Jun 2021 08:42    TPro  6.7  /  Alb  3.1<L>  /  TBili  0.3  /  DBili  x   /  AST  15  /  ALT  10  /  AlkPhos  99  06-19            I&O's Summary    18 Jun 2021 07:01  -  19 Jun 2021 07:00  --------------------------------------------------------  IN: 700 mL / OUT: 1265 mL / NET: -565 mL    19 Jun 2021 07:01  -  19 Jun 2021 15:12  --------------------------------------------------------  IN: 500 mL / OUT: 1000 mL / NET: -500 mL      BNP    RADIOLOGY & ADDITIONAL STUDIES:    TELEMETRY: reviewed    Culture - Acid Fast - Tissue w/Smear (06.19.21 @ 01:20)    Specimen Source: .Tissue rt knee tibia #2 or spec    Acid Fast Bacilli Smear:   No acid fast bacilli seen by fluorochrome stain      Culture - Acid Fast - Tissue w/Smear (06.19.21 @ 01:20)    Specimen Source: .Tissue rt knee posterior synovium ot spec    Acid Fast Bacilli Smear:   No acid fast bacilli seen by fluorochrome stain             INTERVAL EVENTS: No o/n events. Reports improved R knee pain s/p washout. To work w/ PT shortly. Denies CP, dyspnea, palpitations, presyncope, syncope, f/c/n/v.     REVIEW OF SYSTEMS:  Constitutional:     [X] negative [ ] fevers [ ] chills [ ] weight loss [ ] weight gain  HEENT:                  [X] negative [ ] dry eyes [ ] eye irritation [ ] postnasal drip [ ] nasal congestion  CV:                         [X] negative  [ ] chest pain [ ] orthopnea [ ] palpitations [ ] murmur  Resp:                     [X] negative [ ] cough [ ] shortness of breath [ ] wheezing [ ] sputum [ ] hemoptysis  GI:                          [X] negative [ ] nausea [ ] vomiting [ ] diarrhea [ ] constipation [ ] abd pain [ ] dysphagia   :                        [X] negative [ ] dysuria [ ] nocturia [ ] hematuria [ ] increased urinary frequency  MSK:                      [X] negative [ ] back pain [ ] myalgias [ ] arthralgias [ ] fracture  Skin:                       [X] negative [ ] rash [ ] itch  Neuro:                   [X] negative [ ] headache [ ] dizziness [ ] syncope [ ] weakness [ ] numbness  Psych:                    [X] negative [ ] anxiety [ ] depression  Endo:                     [X] negative [ ] diabetes [ ] thyroid problem  Heme/Lymph:      [X] negative [ ] anemia [ ] bleeding problem  Allergic/Immune: [X] negative [ ] itchy eyes [ ] nasal discharge [ ] hives [ ] angioedema    [X] All other systems negative or otherwise described above.  [ ] Unable to assess ROS because ________.    PAST MEDICAL & SURGICAL HISTORY:    MEDICATIONS  (STANDING):  acetaminophen   Tablet .. 975 milliGRAM(s) Oral every 8 hours  aspirin enteric coated 81 milliGRAM(s) Oral two times a day  cefepime   IVPB 2000 milliGRAM(s) IV Intermittent every 8 hours  cefepime   IVPB      celecoxib 200 milliGRAM(s) Oral every 12 hours  famotidine    Tablet 20 milliGRAM(s) Oral every 12 hours  folic acid 1 milliGRAM(s) Oral daily  ketorolac   Injectable 15 milliGRAM(s) IV Push every 6 hours  lactated ringers. 1000 milliLiter(s) (100 mL/Hr) IV Continuous <Continuous>  multivitamin 1 Tablet(s) Oral daily  senna 2 Tablet(s) Oral at bedtime  vancomycin  IVPB      vancomycin  IVPB 1250 milliGRAM(s) IV Intermittent every 12 hours    MEDICATIONS  (PRN):  HYDROmorphone  Injectable 0.5 milliGRAM(s) IV Push every 15 minutes PRN pacu  HYDROmorphone  Injectable 0.5 milliGRAM(s) IV Push every 4 hours PRN breakthrough  magnesium hydroxide Suspension 30 milliLiter(s) Oral daily PRN Constipation  methocarbamol 500 milliGRAM(s) Oral every 8 hours PRN Muscle Spasm  ondansetron Injectable 4 milliGRAM(s) IV Push every 6 hours PRN Nausea and/or Vomiting  oxyCODONE    IR 5 milliGRAM(s) Oral every 4 hours PRN Moderate Pain (4 - 6)  oxyCODONE    IR 10 milliGRAM(s) Oral every 4 hours PRN Severe Pain (7 - 10)    ICU Vital Signs Last 24 Hrs  T(C): 36.5 (19 Jun 2021 14:14), Max: 36.5 (19 Jun 2021 14:14)  T(F): 97.7 (19 Jun 2021 14:14), Max: 97.7 (19 Jun 2021 14:14)  HR: 98 (19 Jun 2021 14:14) (72 - 98)  BP: 111/66 (19 Jun 2021 14:14) (110/70 - 153/75)  BP(mean): 107 (18 Jun 2021 23:26) (100 - 107)  ABP: --  ABP(mean): --  RR: 17 (19 Jun 2021 14:14) (10 - 31)  SpO2: 95% (19 Jun 2021 14:14) (91% - 98%)    Daily     Daily     PHYSICAL EXAM:  GEN: Awake, alert. NAD.   HEENT: NCAT, PERRL, EOMI. Mucosa moist. No JVD.  RESP: CTA b/l  CV: RRR. Normal S1/S2. No m/r/g.  ABD: Soft. NT/ND. BS+  EXT: Warm. No edema, clubbing, or cyanosis. R LE in brace.   NEURO: AAOx3. No focal deficits.     LABS:                        11.7   10.19 )-----------( 441      ( 19 Jun 2021 08:42 )             37.9     06-19    134<L>  |  100  |  11  ----------------------------<  142<H>  4.4   |  25  |  0.65    Ca    8.6      19 Jun 2021 08:42    TPro  6.7  /  Alb  3.1<L>  /  TBili  0.3  /  DBili  x   /  AST  15  /  ALT  10  /  AlkPhos  99  06-19            I&O's Summary    18 Jun 2021 07:01  -  19 Jun 2021 07:00  --------------------------------------------------------  IN: 700 mL / OUT: 1265 mL / NET: -565 mL    19 Jun 2021 07:01  -  19 Jun 2021 15:12  --------------------------------------------------------  IN: 500 mL / OUT: 1000 mL / NET: -500 mL      BNP    RADIOLOGY & ADDITIONAL STUDIES:      Culture - Acid Fast - Tissue w/Smear (06.19.21 @ 01:20)    Specimen Source: .Tissue rt knee tibia #2 or spec    Acid Fast Bacilli Smear:   No acid fast bacilli seen by fluorochrome stain      Culture - Acid Fast - Tissue w/Smear (06.19.21 @ 01:20)    Specimen Source: .Tissue rt knee posterior synovium ot spec    Acid Fast Bacilli Smear:   No acid fast bacilli seen by fluorochrome stain

## 2021-06-19 NOTE — PHYSICAL THERAPY INITIAL EVALUATION ADULT - CRITERIA FOR SKILLED THERAPEUTIC INTERVENTIONS
impairments found/functional limitations in following categories/risk reduction/prevention/therapy frequency/predicted duration of therapy intervention/anticipated equipment needs at discharge/anticipated discharge recommendation

## 2021-06-19 NOTE — PROGRESS NOTE ADULT - SUBJECTIVE AND OBJECTIVE BOX
INTERVAL HPI/OVERNIGHT EVENTS: [place].     CONSTITUTIONAL:  Negative fever or chills, feels well, good appetite  EYES:  Negative  blurry vision or double vision  CARDIOVASCULAR:  Negative for chest pain or palpitations  RESPIRATORY:  Negative for cough, wheezing, or SOB   GASTROINTESTINAL:  Negative for nausea, vomiting, diarrhea, constipation, or abdominal pain  GENITOURINARY:  Negative frequency, urgency or dysuria  NEUROLOGIC:  No headache, confusion, dizziness, lightheadedness      ANTIBIOTICS/RELEVANT:    MEDICATIONS  (STANDING):  acetaminophen   Tablet .. 975 milliGRAM(s) Oral every 8 hours  aspirin enteric coated 81 milliGRAM(s) Oral two times a day  cefepime   IVPB 2000 milliGRAM(s) IV Intermittent every 8 hours  cefepime   IVPB      celecoxib 200 milliGRAM(s) Oral every 12 hours  famotidine    Tablet 20 milliGRAM(s) Oral every 12 hours  folic acid 1 milliGRAM(s) Oral daily  ketorolac   Injectable 15 milliGRAM(s) IV Push every 6 hours  lactated ringers. 1000 milliLiter(s) (100 mL/Hr) IV Continuous <Continuous>  multivitamin 1 Tablet(s) Oral daily  senna 2 Tablet(s) Oral at bedtime  vancomycin  IVPB      vancomycin  IVPB 1250 milliGRAM(s) IV Intermittent every 12 hours    MEDICATIONS  (PRN):  HYDROmorphone  Injectable 0.5 milliGRAM(s) IV Push every 15 minutes PRN pacu  HYDROmorphone  Injectable 0.5 milliGRAM(s) IV Push every 4 hours PRN breakthrough  magnesium hydroxide Suspension 30 milliLiter(s) Oral daily PRN Constipation  methocarbamol 500 milliGRAM(s) Oral every 8 hours PRN Muscle Spasm  ondansetron Injectable 4 milliGRAM(s) IV Push every 6 hours PRN Nausea and/or Vomiting  oxyCODONE    IR 5 milliGRAM(s) Oral every 4 hours PRN Moderate Pain (4 - 6)  oxyCODONE    IR 10 milliGRAM(s) Oral every 4 hours PRN Severe Pain (7 - 10)        Vital Signs Last 24 Hrs  T(C): 36.5 (19 Jun 2021 14:14), Max: 36.5 (19 Jun 2021 14:14)  T(F): 97.7 (19 Jun 2021 14:14), Max: 97.7 (19 Jun 2021 14:14)  HR: 98 (19 Jun 2021 14:14) (72 - 98)  BP: 111/66 (19 Jun 2021 14:14) (110/70 - 153/75)  BP(mean): 107 (18 Jun 2021 23:26) (100 - 107)  RR: 17 (19 Jun 2021 14:14) (10 - 31)  SpO2: 95% (19 Jun 2021 14:14) (91% - 98%)    PHYSICAL EXAM:  Constitutional:Well-developed, well nourished  Eyes:EVY, EOMI  Ear/Nose/Throat: no oral lesion, no sinus tenderness on percussion	  Neck:no JVD, no lymphadenopathy, supple  Respiratory: CTA karen  Cardiovascular: S1S2 RRR, no murmurs  Gastrointestinal:soft, (+) BS, no HSM  Extremities:no e/e/c  Vascular: DP Pulse:	right normal; left normal      LABS:                        11.7   10.19 )-----------( 441      ( 19 Jun 2021 08:42 )             37.9     06-19    134<L>  |  100  |  11  ----------------------------<  142<H>  4.4   |  25  |  0.65    Ca    8.6      19 Jun 2021 08:42    TPro  6.7  /  Alb  3.1<L>  /  TBili  0.3  /  DBili  x   /  AST  15  /  ALT  10  /  AlkPhos  99  06-19          MICROBIOLOGY: reviewed    RADIOLOGY & ADDITIONAL STUDIES: reviewed

## 2021-06-20 LAB
ANION GAP SERPL CALC-SCNC: 8 MMOL/L — SIGNIFICANT CHANGE UP (ref 5–17)
BUN SERPL-MCNC: 17 MG/DL — SIGNIFICANT CHANGE UP (ref 7–23)
CALCIUM SERPL-MCNC: 8.9 MG/DL — SIGNIFICANT CHANGE UP (ref 8.4–10.5)
CHLORIDE SERPL-SCNC: 103 MMOL/L — SIGNIFICANT CHANGE UP (ref 96–108)
CO2 SERPL-SCNC: 28 MMOL/L — SIGNIFICANT CHANGE UP (ref 22–31)
CREAT SERPL-MCNC: 0.69 MG/DL — SIGNIFICANT CHANGE UP (ref 0.5–1.3)
GLUCOSE SERPL-MCNC: 97 MG/DL — SIGNIFICANT CHANGE UP (ref 70–99)
HCT VFR BLD CALC: 32.4 % — LOW (ref 34.5–45)
HGB BLD-MCNC: 10 G/DL — LOW (ref 11.5–15.5)
MCHC RBC-ENTMCNC: 27.9 PG — SIGNIFICANT CHANGE UP (ref 27–34)
MCHC RBC-ENTMCNC: 30.9 GM/DL — LOW (ref 32–36)
MCV RBC AUTO: 90.3 FL — SIGNIFICANT CHANGE UP (ref 80–100)
NRBC # BLD: 0 /100 WBCS — SIGNIFICANT CHANGE UP (ref 0–0)
PLATELET # BLD AUTO: 350 K/UL — SIGNIFICANT CHANGE UP (ref 150–400)
POTASSIUM SERPL-MCNC: 4.2 MMOL/L — SIGNIFICANT CHANGE UP (ref 3.5–5.3)
POTASSIUM SERPL-SCNC: 4.2 MMOL/L — SIGNIFICANT CHANGE UP (ref 3.5–5.3)
RBC # BLD: 3.59 M/UL — LOW (ref 3.8–5.2)
RBC # FLD: 13.2 % — SIGNIFICANT CHANGE UP (ref 10.3–14.5)
SODIUM SERPL-SCNC: 139 MMOL/L — SIGNIFICANT CHANGE UP (ref 135–145)
VANCOMYCIN FLD-MCNC: 10.2 UG/ML — SIGNIFICANT CHANGE UP
WBC # BLD: 5.93 K/UL — SIGNIFICANT CHANGE UP (ref 3.8–10.5)
WBC # FLD AUTO: 5.93 K/UL — SIGNIFICANT CHANGE UP (ref 3.8–10.5)

## 2021-06-20 PROCEDURE — 99232 SBSQ HOSP IP/OBS MODERATE 35: CPT

## 2021-06-20 RX ORDER — VANCOMYCIN HCL 1 G
1500 VIAL (EA) INTRAVENOUS EVERY 12 HOURS
Refills: 0 | Status: DISCONTINUED | OUTPATIENT
Start: 2021-06-20 | End: 2021-06-24

## 2021-06-20 RX ORDER — PSYLLIUM SEED (WITH DEXTROSE)
1 POWDER (GRAM) ORAL
Refills: 0 | Status: DISCONTINUED | OUTPATIENT
Start: 2021-06-20 | End: 2021-06-25

## 2021-06-20 RX ADMIN — OXYCODONE HYDROCHLORIDE 10 MILLIGRAM(S): 5 TABLET ORAL at 13:30

## 2021-06-20 RX ADMIN — SENNA PLUS 2 TABLET(S): 8.6 TABLET ORAL at 21:58

## 2021-06-20 RX ADMIN — ONDANSETRON 4 MILLIGRAM(S): 8 TABLET, FILM COATED ORAL at 19:35

## 2021-06-20 RX ADMIN — Medication 975 MILLIGRAM(S): at 22:27

## 2021-06-20 RX ADMIN — Medication 15 MILLIGRAM(S): at 06:12

## 2021-06-20 RX ADMIN — Medication 81 MILLIGRAM(S): at 05:23

## 2021-06-20 RX ADMIN — FAMOTIDINE 20 MILLIGRAM(S): 10 INJECTION INTRAVENOUS at 05:23

## 2021-06-20 RX ADMIN — OXYCODONE HYDROCHLORIDE 10 MILLIGRAM(S): 5 TABLET ORAL at 02:37

## 2021-06-20 RX ADMIN — Medication 975 MILLIGRAM(S): at 15:04

## 2021-06-20 RX ADMIN — Medication 975 MILLIGRAM(S): at 21:57

## 2021-06-20 RX ADMIN — CELECOXIB 200 MILLIGRAM(S): 200 CAPSULE ORAL at 18:52

## 2021-06-20 RX ADMIN — Medication 15 MILLIGRAM(S): at 00:33

## 2021-06-20 RX ADMIN — HYDROMORPHONE HYDROCHLORIDE 0.5 MILLIGRAM(S): 2 INJECTION INTRAMUSCULAR; INTRAVENOUS; SUBCUTANEOUS at 11:41

## 2021-06-20 RX ADMIN — Medication 975 MILLIGRAM(S): at 06:12

## 2021-06-20 RX ADMIN — HYDROMORPHONE HYDROCHLORIDE 0.5 MILLIGRAM(S): 2 INJECTION INTRAMUSCULAR; INTRAVENOUS; SUBCUTANEOUS at 21:00

## 2021-06-20 RX ADMIN — Medication 1 MILLIGRAM(S): at 15:03

## 2021-06-20 RX ADMIN — Medication 15 MILLIGRAM(S): at 00:14

## 2021-06-20 RX ADMIN — Medication 975 MILLIGRAM(S): at 05:23

## 2021-06-20 RX ADMIN — CEFEPIME 100 MILLIGRAM(S): 1 INJECTION, POWDER, FOR SOLUTION INTRAMUSCULAR; INTRAVENOUS at 23:53

## 2021-06-20 RX ADMIN — CELECOXIB 200 MILLIGRAM(S): 200 CAPSULE ORAL at 05:23

## 2021-06-20 RX ADMIN — CELECOXIB 200 MILLIGRAM(S): 200 CAPSULE ORAL at 18:57

## 2021-06-20 RX ADMIN — Medication 1 TABLET(S): at 15:03

## 2021-06-20 RX ADMIN — Medication 81 MILLIGRAM(S): at 18:52

## 2021-06-20 RX ADMIN — Medication 300 MILLIGRAM(S): at 09:14

## 2021-06-20 RX ADMIN — HYDROMORPHONE HYDROCHLORIDE 0.5 MILLIGRAM(S): 2 INJECTION INTRAMUSCULAR; INTRAVENOUS; SUBCUTANEOUS at 17:04

## 2021-06-20 RX ADMIN — HYDROMORPHONE HYDROCHLORIDE 0.5 MILLIGRAM(S): 2 INJECTION INTRAMUSCULAR; INTRAVENOUS; SUBCUTANEOUS at 16:13

## 2021-06-20 RX ADMIN — OXYCODONE HYDROCHLORIDE 10 MILLIGRAM(S): 5 TABLET ORAL at 03:06

## 2021-06-20 RX ADMIN — CELECOXIB 200 MILLIGRAM(S): 200 CAPSULE ORAL at 06:12

## 2021-06-20 RX ADMIN — OXYCODONE HYDROCHLORIDE 10 MILLIGRAM(S): 5 TABLET ORAL at 13:43

## 2021-06-20 RX ADMIN — Medication 975 MILLIGRAM(S): at 14:59

## 2021-06-20 RX ADMIN — ONDANSETRON 4 MILLIGRAM(S): 8 TABLET, FILM COATED ORAL at 02:36

## 2021-06-20 RX ADMIN — CEFEPIME 100 MILLIGRAM(S): 1 INJECTION, POWDER, FOR SOLUTION INTRAMUSCULAR; INTRAVENOUS at 15:03

## 2021-06-20 RX ADMIN — FAMOTIDINE 20 MILLIGRAM(S): 10 INJECTION INTRAVENOUS at 18:52

## 2021-06-20 RX ADMIN — HYDROMORPHONE HYDROCHLORIDE 0.5 MILLIGRAM(S): 2 INJECTION INTRAMUSCULAR; INTRAVENOUS; SUBCUTANEOUS at 21:15

## 2021-06-20 RX ADMIN — CEFEPIME 100 MILLIGRAM(S): 1 INJECTION, POWDER, FOR SOLUTION INTRAMUSCULAR; INTRAVENOUS at 05:22

## 2021-06-20 RX ADMIN — HYDROMORPHONE HYDROCHLORIDE 0.5 MILLIGRAM(S): 2 INJECTION INTRAMUSCULAR; INTRAVENOUS; SUBCUTANEOUS at 08:11

## 2021-06-20 RX ADMIN — Medication 15 MILLIGRAM(S): at 05:23

## 2021-06-20 RX ADMIN — Medication 300 MILLIGRAM(S): at 21:57

## 2021-06-20 RX ADMIN — HYDROMORPHONE HYDROCHLORIDE 0.5 MILLIGRAM(S): 2 INJECTION INTRAMUSCULAR; INTRAVENOUS; SUBCUTANEOUS at 07:39

## 2021-06-20 RX ADMIN — HYDROMORPHONE HYDROCHLORIDE 0.5 MILLIGRAM(S): 2 INJECTION INTRAMUSCULAR; INTRAVENOUS; SUBCUTANEOUS at 11:39

## 2021-06-20 RX ADMIN — ONDANSETRON 4 MILLIGRAM(S): 8 TABLET, FILM COATED ORAL at 13:35

## 2021-06-20 NOTE — PROGRESS NOTE ADULT - SUBJECTIVE AND OBJECTIVE BOX
Ortho Note    Procedure: R TKA explant, antibiotic spacer  Surgeon: Oh    Pt comfortable without complaints, pain controlled. Denies CP, SOB, N/V, numbness/tingling. Drain removed yesterday.      Vital Signs Last 24 Hrs  T(C): 36.7 (20 Jun 2021 05:07), Max: 36.7 (19 Jun 2021 20:48)  T(F): 98.1 (20 Jun 2021 05:07), Max: 98.1 (19 Jun 2021 20:48)  HR: 83 (20 Jun 2021 05:07) (83 - 109)  BP: 100/58 (20 Jun 2021 05:07) (89/45 - 111/66)  BP(mean): --  RR: 17 (20 Jun 2021 05:07) (16 - 18)  SpO2: 95% (20 Jun 2021 05:07) (93% - 98%)    Physical Exam:  General: Resting comfortably in bed. AAOx3. NAD.  Extremities:   RLE: Knee immobilizer in place. Prevena intact w/ good function. SILT s/s/sp/dp/t. TA/EHL/FHL/GS motor intact. 2+ PT ,       A/P: 55yFemale  s/p R TKA explant, antibiotic spacer implant  - Stable  - Pain Control  - DVT ppx: ASA  - Post op abx: Vanc + cefepime  - PT, WBS: 50% PWB x6 weeks, w/ KI for first 2 weeks   - Dispo: [facility]     Ortho Pager [phone]

## 2021-06-20 NOTE — PROGRESS NOTE ADULT - SUBJECTIVE AND OBJECTIVE BOX
INTERVAL EVENTS: No o/n events. Denies CP, dyspnea, palpitations, presyncope, syncope, f/c/n/v.     REVIEW OF SYSTEMS:  Constitutional:     [X] negative [ ] fevers [ ] chills [ ] weight loss [ ] weight gain  HEENT:                  [X] negative [ ] dry eyes [ ] eye irritation [ ] postnasal drip [ ] nasal congestion  CV:                         [X] negative  [ ] chest pain [ ] orthopnea [ ] palpitations [ ] murmur  Resp:                     [X] negative [ ] cough [ ] shortness of breath [ ] wheezing [ ] sputum [ ] hemoptysis  GI:                          [X] negative [ ] nausea [ ] vomiting [ ] diarrhea [ ] constipation [ ] abd pain [ ] dysphagia   :                        [X] negative [ ] dysuria [ ] nocturia [ ] hematuria [ ] increased urinary frequency  MSK:                      [X] negative [ ] back pain [ ] myalgias [ ] arthralgias [ ] fracture  Skin:                       [X] negative [ ] rash [ ] itch  Neuro:                   [X] negative [ ] headache [ ] dizziness [ ] syncope [ ] weakness [ ] numbness  Psych:                    [X] negative [ ] anxiety [ ] depression  Endo:                     [X] negative [ ] diabetes [ ] thyroid problem  Heme/Lymph:      [X] negative [ ] anemia [ ] bleeding problem  Allergic/Immune: [X] negative [ ] itchy eyes [ ] nasal discharge [ ] hives [ ] angioedema    [X] All other systems negative or otherwise described above.  [ ] Unable to assess ROS because ________.    PAST MEDICAL & SURGICAL HISTORY:    MEDICATIONS  (STANDING):  acetaminophen   Tablet .. 975 milliGRAM(s) Oral every 8 hours  aspirin enteric coated 81 milliGRAM(s) Oral two times a day  cefepime   IVPB 2000 milliGRAM(s) IV Intermittent every 8 hours  cefepime   IVPB      celecoxib 200 milliGRAM(s) Oral every 12 hours  famotidine    Tablet 20 milliGRAM(s) Oral every 12 hours  folic acid 1 milliGRAM(s) Oral daily  lactated ringers. 1000 milliLiter(s) (100 mL/Hr) IV Continuous <Continuous>  multivitamin 1 Tablet(s) Oral daily  senna 2 Tablet(s) Oral at bedtime  vancomycin  IVPB 1500 milliGRAM(s) IV Intermittent every 12 hours    MEDICATIONS  (PRN):  HYDROmorphone  Injectable 0.5 milliGRAM(s) IV Push every 15 minutes PRN pacu  HYDROmorphone  Injectable 0.5 milliGRAM(s) IV Push every 4 hours PRN breakthrough  magnesium hydroxide Suspension 30 milliLiter(s) Oral daily PRN Constipation  methocarbamol 500 milliGRAM(s) Oral every 8 hours PRN Muscle Spasm  ondansetron Injectable 4 milliGRAM(s) IV Push every 6 hours PRN Nausea and/or Vomiting  oxyCODONE    IR 5 milliGRAM(s) Oral every 4 hours PRN Moderate Pain (4 - 6)  oxyCODONE    IR 10 milliGRAM(s) Oral every 4 hours PRN Severe Pain (7 - 10)    ICU Vital Signs Last 24 Hrs  T(C): 36.8 (20 Jun 2021 08:36), Max: 36.8 (20 Jun 2021 08:36)  T(F): 98.3 (20 Jun 2021 08:36), Max: 98.3 (20 Jun 2021 08:36)  HR: 81 (20 Jun 2021 08:36) (81 - 109)  BP: 107/67 (20 Jun 2021 08:36) (89/45 - 107/67)  BP(mean): --  ABP: --  ABP(mean): --  RR: 17 (20 Jun 2021 08:36) (17 - 18)  SpO2: 93% (20 Jun 2021 08:36) (93% - 98%)    Daily     Daily     PHYSICAL EXAM:  GEN: Awake, alert. NAD.   HEENT: NCAT, PERRL, EOMI. Mucosa moist. No JVD.  RESP: CTA b/l  CV: RRR. Normal S1/S2. No m/r/g.  ABD: Soft. NT/ND. BS+  EXT: Warm. No edema, clubbing, or cyanosis. R LE in brace.   NEURO: AAOx3. No focal deficits.     LABS:                        10.0   5.93  )-----------( 350      ( 20 Jun 2021 06:57 )             32.4     06-20    139  |  103  |  17  ----------------------------<  97  4.2   |  28  |  0.69    Ca    8.9      20 Jun 2021 06:57    TPro  6.7  /  Alb  3.1<L>  /  TBili  0.3  /  DBili  x   /  AST  15  /  ALT  10  /  AlkPhos  99  06-19            I&O's Summary    19 Jun 2021 07:01  -  20 Jun 2021 07:00  --------------------------------------------------------  IN: 2120 mL / OUT: 1300 mL / NET: 820 mL    20 Jun 2021 07:01  -  20 Jun 2021 15:15  --------------------------------------------------------  IN: 910 mL / OUT: 500 mL / NET: 410 mL      BNP    RADIOLOGY & ADDITIONAL STUDIES:    TELEMETRY: reviewed    EKG: reviewed

## 2021-06-21 ENCOUNTER — TRANSCRIPTION ENCOUNTER (OUTPATIENT)
Age: 56
End: 2021-06-21

## 2021-06-21 LAB
ANION GAP SERPL CALC-SCNC: 10 MMOL/L — SIGNIFICANT CHANGE UP (ref 5–17)
BUN SERPL-MCNC: 12 MG/DL — SIGNIFICANT CHANGE UP (ref 7–23)
CALCIUM SERPL-MCNC: 9 MG/DL — SIGNIFICANT CHANGE UP (ref 8.4–10.5)
CHLORIDE SERPL-SCNC: 97 MMOL/L — SIGNIFICANT CHANGE UP (ref 96–108)
CO2 SERPL-SCNC: 29 MMOL/L — SIGNIFICANT CHANGE UP (ref 22–31)
CREAT SERPL-MCNC: 0.64 MG/DL — SIGNIFICANT CHANGE UP (ref 0.5–1.3)
GLUCOSE SERPL-MCNC: 164 MG/DL — HIGH (ref 70–99)
HCT VFR BLD CALC: 32.6 % — LOW (ref 34.5–45)
HGB BLD-MCNC: 10.1 G/DL — LOW (ref 11.5–15.5)
MCHC RBC-ENTMCNC: 27.9 PG — SIGNIFICANT CHANGE UP (ref 27–34)
MCHC RBC-ENTMCNC: 31 GM/DL — LOW (ref 32–36)
MCV RBC AUTO: 90.1 FL — SIGNIFICANT CHANGE UP (ref 80–100)
NRBC # BLD: 0 /100 WBCS — SIGNIFICANT CHANGE UP (ref 0–0)
PLATELET # BLD AUTO: 337 K/UL — SIGNIFICANT CHANGE UP (ref 150–400)
POTASSIUM SERPL-MCNC: 4 MMOL/L — SIGNIFICANT CHANGE UP (ref 3.5–5.3)
POTASSIUM SERPL-SCNC: 4 MMOL/L — SIGNIFICANT CHANGE UP (ref 3.5–5.3)
RBC # BLD: 3.62 M/UL — LOW (ref 3.8–5.2)
RBC # FLD: 13.3 % — SIGNIFICANT CHANGE UP (ref 10.3–14.5)
SODIUM SERPL-SCNC: 136 MMOL/L — SIGNIFICANT CHANGE UP (ref 135–145)
VANCOMYCIN TROUGH SERPL-MCNC: 13.5 UG/ML — SIGNIFICANT CHANGE UP (ref 10–20)
WBC # BLD: 7.4 K/UL — SIGNIFICANT CHANGE UP (ref 3.8–10.5)
WBC # FLD AUTO: 7.4 K/UL — SIGNIFICANT CHANGE UP (ref 3.8–10.5)

## 2021-06-21 PROCEDURE — 99232 SBSQ HOSP IP/OBS MODERATE 35: CPT

## 2021-06-21 RX ORDER — OXYCODONE HYDROCHLORIDE 5 MG/1
1 TABLET ORAL
Qty: 0 | Refills: 0 | DISCHARGE

## 2021-06-21 RX ORDER — CEFEPIME 1 G/1
2 INJECTION, POWDER, FOR SOLUTION INTRAMUSCULAR; INTRAVENOUS
Qty: 152 | Refills: 0
Start: 2021-06-21 | End: 2021-07-28

## 2021-06-21 RX ORDER — CEFEPIME 1 G/1
2000 INJECTION, POWDER, FOR SOLUTION INTRAMUSCULAR; INTRAVENOUS EVERY 12 HOURS
Refills: 0 | Status: DISCONTINUED | OUTPATIENT
Start: 2021-06-21 | End: 2021-06-25

## 2021-06-21 RX ADMIN — FAMOTIDINE 20 MILLIGRAM(S): 10 INJECTION INTRAVENOUS at 17:25

## 2021-06-21 RX ADMIN — HYDROMORPHONE HYDROCHLORIDE 0.5 MILLIGRAM(S): 2 INJECTION INTRAMUSCULAR; INTRAVENOUS; SUBCUTANEOUS at 13:37

## 2021-06-21 RX ADMIN — HYDROMORPHONE HYDROCHLORIDE 0.5 MILLIGRAM(S): 2 INJECTION INTRAMUSCULAR; INTRAVENOUS; SUBCUTANEOUS at 08:12

## 2021-06-21 RX ADMIN — HYDROMORPHONE HYDROCHLORIDE 0.5 MILLIGRAM(S): 2 INJECTION INTRAMUSCULAR; INTRAVENOUS; SUBCUTANEOUS at 13:34

## 2021-06-21 RX ADMIN — CELECOXIB 200 MILLIGRAM(S): 200 CAPSULE ORAL at 17:29

## 2021-06-21 RX ADMIN — OXYCODONE HYDROCHLORIDE 10 MILLIGRAM(S): 5 TABLET ORAL at 15:11

## 2021-06-21 RX ADMIN — HYDROMORPHONE HYDROCHLORIDE 0.5 MILLIGRAM(S): 2 INJECTION INTRAMUSCULAR; INTRAVENOUS; SUBCUTANEOUS at 08:07

## 2021-06-21 RX ADMIN — Medication 1 TABLET(S): at 17:25

## 2021-06-21 RX ADMIN — OXYCODONE HYDROCHLORIDE 10 MILLIGRAM(S): 5 TABLET ORAL at 01:33

## 2021-06-21 RX ADMIN — HYDROMORPHONE HYDROCHLORIDE 0.5 MILLIGRAM(S): 2 INJECTION INTRAMUSCULAR; INTRAVENOUS; SUBCUTANEOUS at 02:05

## 2021-06-21 RX ADMIN — OXYCODONE HYDROCHLORIDE 10 MILLIGRAM(S): 5 TABLET ORAL at 23:40

## 2021-06-21 RX ADMIN — CELECOXIB 200 MILLIGRAM(S): 200 CAPSULE ORAL at 06:34

## 2021-06-21 RX ADMIN — SENNA PLUS 2 TABLET(S): 8.6 TABLET ORAL at 21:32

## 2021-06-21 RX ADMIN — ONDANSETRON 4 MILLIGRAM(S): 8 TABLET, FILM COATED ORAL at 01:03

## 2021-06-21 RX ADMIN — Medication 975 MILLIGRAM(S): at 06:04

## 2021-06-21 RX ADMIN — OXYCODONE HYDROCHLORIDE 10 MILLIGRAM(S): 5 TABLET ORAL at 09:24

## 2021-06-21 RX ADMIN — CEFEPIME 100 MILLIGRAM(S): 1 INJECTION, POWDER, FOR SOLUTION INTRAMUSCULAR; INTRAVENOUS at 17:25

## 2021-06-21 RX ADMIN — OXYCODONE HYDROCHLORIDE 10 MILLIGRAM(S): 5 TABLET ORAL at 23:10

## 2021-06-21 RX ADMIN — HYDROMORPHONE HYDROCHLORIDE 0.5 MILLIGRAM(S): 2 INJECTION INTRAMUSCULAR; INTRAVENOUS; SUBCUTANEOUS at 02:20

## 2021-06-21 RX ADMIN — CELECOXIB 200 MILLIGRAM(S): 200 CAPSULE ORAL at 17:37

## 2021-06-21 RX ADMIN — Medication 975 MILLIGRAM(S): at 21:32

## 2021-06-21 RX ADMIN — ONDANSETRON 4 MILLIGRAM(S): 8 TABLET, FILM COATED ORAL at 23:10

## 2021-06-21 RX ADMIN — Medication 975 MILLIGRAM(S): at 22:02

## 2021-06-21 RX ADMIN — Medication 1 PACKET(S): at 00:22

## 2021-06-21 RX ADMIN — Medication 1 MILLIGRAM(S): at 17:25

## 2021-06-21 RX ADMIN — OXYCODONE HYDROCHLORIDE 10 MILLIGRAM(S): 5 TABLET ORAL at 09:17

## 2021-06-21 RX ADMIN — OXYCODONE HYDROCHLORIDE 10 MILLIGRAM(S): 5 TABLET ORAL at 15:16

## 2021-06-21 RX ADMIN — ONDANSETRON 4 MILLIGRAM(S): 8 TABLET, FILM COATED ORAL at 17:40

## 2021-06-21 RX ADMIN — Medication 300 MILLIGRAM(S): at 21:55

## 2021-06-21 RX ADMIN — HYDROMORPHONE HYDROCHLORIDE 0.5 MILLIGRAM(S): 2 INJECTION INTRAMUSCULAR; INTRAVENOUS; SUBCUTANEOUS at 17:48

## 2021-06-21 RX ADMIN — CELECOXIB 200 MILLIGRAM(S): 200 CAPSULE ORAL at 06:04

## 2021-06-21 RX ADMIN — OXYCODONE HYDROCHLORIDE 10 MILLIGRAM(S): 5 TABLET ORAL at 01:03

## 2021-06-21 RX ADMIN — Medication 975 MILLIGRAM(S): at 06:34

## 2021-06-21 RX ADMIN — Medication 81 MILLIGRAM(S): at 17:25

## 2021-06-21 RX ADMIN — Medication 81 MILLIGRAM(S): at 06:04

## 2021-06-21 RX ADMIN — CEFEPIME 100 MILLIGRAM(S): 1 INJECTION, POWDER, FOR SOLUTION INTRAMUSCULAR; INTRAVENOUS at 06:04

## 2021-06-21 RX ADMIN — Medication 300 MILLIGRAM(S): at 09:23

## 2021-06-21 RX ADMIN — FAMOTIDINE 20 MILLIGRAM(S): 10 INJECTION INTRAVENOUS at 06:04

## 2021-06-21 RX ADMIN — ONDANSETRON 4 MILLIGRAM(S): 8 TABLET, FILM COATED ORAL at 08:07

## 2021-06-21 RX ADMIN — HYDROMORPHONE HYDROCHLORIDE 0.5 MILLIGRAM(S): 2 INJECTION INTRAMUSCULAR; INTRAVENOUS; SUBCUTANEOUS at 17:40

## 2021-06-21 NOTE — DISCHARGE NOTE PROVIDER - NSDCCPCAREPLAN_GEN_ALL_CORE_FT
PRINCIPAL DISCHARGE DIAGNOSIS  Diagnosis: Infection of prosthetic knee joint, initial encounter  Assessment and Plan of Treatment: s/p Right knee explant and spacer

## 2021-06-21 NOTE — DISCHARGE NOTE PROVIDER - NSDCCPTREATMENT_GEN_ALL_CORE_FT
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

## 2021-06-21 NOTE — PROGRESS NOTE ADULT - SUBJECTIVE AND OBJECTIVE BOX
Ortho Note    Subjective:  Pt comfortable without complaints, pain controlled with current pain medication regimen. R knee in a KI with prevena drain.   Denies CP, SOB, N/V, numbness/tingling     Vital Signs Last 24 Hrs  T(C): 36.8 (06-21-21 @ 07:57), Max: 36.8 (06-21-21 @ 07:57)  T(F): 98.3 (06-21-21 @ 07:57), Max: 98.3 (06-21-21 @ 07:57)  HR: 87 (06-21-21 @ 07:57) (87 - 87)  BP: 123/91 (06-21-21 @ 07:57) (110/73 - 123/91)  BP(mean): --  RR: 17 (06-21-21 @ 07:57) (17 - 17)  SpO2: 97% (06-21-21 @ 07:57) (93% - 97%)  AVSS    Objective:    Physical Exam:  General: Pt Alert and oriented, NAD  Right knee DSG C/D/I  Pulses: +2 pedal pulses, wwp toes, cap refill less than 3 seconds  Sensation: SILT intact  Motor: EHL/FHL/TA/GS- firing        Plan of Care:  A/P: 55yFemale POD# s/p   - afebrile, nontoxic apperance  - Pain Control- tylneol 975mg PO Q9h, celebrex 200mg PO BID<   - DVT ppx: aspiring 81mg PO BID, SCDS  - PT, WBS:     Ortho Pager [phone] Ortho Note    Subjective:  Pt comfortable without complaints, pain controlled with current pain medication regimen. R knee in a KI with prevena drain.   Denies CP, SOB, N/V, numbness/tingling     Vital Signs Last 24 Hrs  T(C): 36.8 (06-21-21 @ 07:57), Max: 36.8 (06-21-21 @ 07:57)  T(F): 98.3 (06-21-21 @ 07:57), Max: 98.3 (06-21-21 @ 07:57)  HR: 87 (06-21-21 @ 07:57) (87 - 87)  BP: 123/91 (06-21-21 @ 07:57) (110/73 - 123/91)  BP(mean): --  RR: 17 (06-21-21 @ 07:57) (17 - 17)  SpO2: 97% (06-21-21 @ 07:57) (93% - 97%)  AVSS    Objective:    Physical Exam:  General: Pt Alert and oriented, NAD  Right knee DSG C/D/I  Pulses: +2 pedal pulses, wwp toes, cap refill less than 3 seconds  Sensation: SILT intact  Motor: EHL/FHL/TA/GS- firing        Plan of Care:  A/P: 55yFemale admitted with R infected TKR, Left infected TKR, s/p Right knee explant and space, Left Knee aspiration   - afebrile, nontoxic appearance  - Pain Control- Oxycodone 5-10mg PO Q3h prn moderate to severe pain, Dilaudid 0.5mg Q4h prn breakthrough pain, methocarbamol 500mg PO TID, gabapentin 100mg PO TID, tylenol 1000mg PO Q8h,   - DVT ppx: SCDS  - PT, WBS: WBAT  - Appreciate ID Recs  - Appreciate Medicine Recs  - Vancomycin 1500mg Q12 IV and Cefepime IV Q8h vanco trough before the 4th dose (9pm 6-21)   - followup cultures  - Dispo: pending picc placement, insurance auth of home infusion and antibiotics    Ortho Pager [phone]

## 2021-06-21 NOTE — DISCHARGE NOTE PROVIDER - NSDCFUADDINST_GEN_ALL_CORE_FT
Weight bear as tolerated with assistive device.  No strenuous activity, heavy lifting, driving or returning to work until cleared by MD.  You may shower - dressing is water-resistant, no soaking in bathtubs.  Remove dressing after post op day 5-7, then leave incision open to air. Keep incision clean and dry.  Try to have regular bowel movements, take stool softener or laxative if necessary.  Swelling may travel all the way down leg to foot, this is normal and will subside in a few weeks.  Call to schedule an appt with Dr. Fay for follow up, if you have staples or sutures they will be removed in office.  Contact your doctor if you experience: fever greater than 101.5, chills, chest pain, difficulty breathing, redness or excessive drainage around the incision, other concerns.  Follow up with your primary care provider.     You have weightbearing restrictions. You should only place 50% of your body weight on your right leg/foot when ambulating. You should always wear your knee immobilizer when ambulating.     You should follow up with your pain management provider for further pain medication prescriptions. You should continue to take the oxycodone prescription for pain that you have at home.    No strenuous activity, heavy lifting, driving or returning to work until cleared by MD.  Try to have regular bowel movements, take stool softener or laxative if necessary.    Swelling may travel all the way down leg to foot, this is normal and will subside in a few weeks.  Call to schedule an appt with Dr. Fay for follow up, if you have staples or sutures they will be removed in office.    Contact your doctor if you experience: fever greater than 101.5, chills, chest pain, difficulty breathing, redness or excessive drainage around the incision, other concerns.  Follow up with your primary care provider.

## 2021-06-21 NOTE — DISCHARGE NOTE PROVIDER - CARE PROVIDER_API CALL
Kevon Fay)  Orthopedics  [address] [facility] [place], [place] [address]  Phone: [phone]  Fax: [phone]  Follow Up Time: 2 weeks   Kevon Fay)  Orthopedics  [address] [place] [address]  Phone: [phone]  Fax: [phone]  Follow Up Time: 1 week   Kevon Fay)  Orthopedics  [address] [place] [address]  Phone: [phone]  Fax: [phone]  Follow Up Time: 1 week    Kulwinder Whitman)  Internal Medicine  [address] [place] [address]  Phone: [phone]  Fax: [phone]  Follow Up Time: 2 weeks

## 2021-06-21 NOTE — PROGRESS NOTE ADULT - SUBJECTIVE AND OBJECTIVE BOX
OVERNIGHT EVENTS:     SUBJECTIVE / INTERVAL HPI: Patient seen and examined at bedside. Had a bad day yesterday after her pain was not controlled - did not know she had to ask for pain meds. Pain meds are currently controlled. Knows she will need PICC.     VITAL SIGNS:  Vital Signs Last 24 Hrs  T(C): 36.8 (21 Jun 2021 07:57), Max: 36.8 (20 Jun 2021 16:43)  T(F): 98.3 (21 Jun 2021 07:57), Max: 98.3 (20 Jun 2021 16:43)  HR: 87 (21 Jun 2021 07:57) (75 - 87)  BP: 123/91 (21 Jun 2021 07:57) (110/73 - 138/87)  BP(mean): 95 (20 Jun 2021 20:23) (95 - 95)  RR: 17 (21 Jun 2021 07:57) (17 - 18)  SpO2: 97% (21 Jun 2021 07:57) (93% - 97%)    PHYSICAL EXAM:    General: WDWN  HEENT: NC/AT;   Neck: supple  Cardiovascular: +S1/S2; RRR  Respiratory: CTA b/l; no W/R/R  Gastrointestinal: soft, NT/ND; +BSx4  Extremities: WWP; R knee brace, L knee w/ scar    MEDICATIONS:  MEDICATIONS  (STANDING):  acetaminophen   Tablet .. 975 milliGRAM(s) Oral every 8 hours  aspirin enteric coated 81 milliGRAM(s) Oral two times a day  cefepime   IVPB      cefepime   IVPB 2000 milliGRAM(s) IV Intermittent every 8 hours  celecoxib 200 milliGRAM(s) Oral every 12 hours  famotidine    Tablet 20 milliGRAM(s) Oral every 12 hours  folic acid 1 milliGRAM(s) Oral daily  lactated ringers. 1000 milliLiter(s) (100 mL/Hr) IV Continuous <Continuous>  multivitamin 1 Tablet(s) Oral daily  senna 2 Tablet(s) Oral at bedtime  vancomycin  IVPB 1500 milliGRAM(s) IV Intermittent every 12 hours    MEDICATIONS  (PRN):  HYDROmorphone  Injectable 0.5 milliGRAM(s) IV Push every 15 minutes PRN pacu  HYDROmorphone  Injectable 0.5 milliGRAM(s) IV Push every 4 hours PRN breakthrough  magnesium hydroxide Suspension 30 milliLiter(s) Oral daily PRN Constipation  methocarbamol 500 milliGRAM(s) Oral every 8 hours PRN Muscle Spasm  ondansetron Injectable 4 milliGRAM(s) IV Push every 6 hours PRN Nausea and/or Vomiting  oxyCODONE    IR 5 milliGRAM(s) Oral every 4 hours PRN Moderate Pain (4 - 6)  oxyCODONE    IR 10 milliGRAM(s) Oral every 4 hours PRN Severe Pain (7 - 10)  psyllium Powder 1 Packet(s) Oral two times a day PRN constipation      ALLERGIES:  Allergies    No Known Allergies    Intolerances        LABS:                        10.1   7.40  )-----------( 337      ( 21 Jun 2021 07:05 )             32.6     06-21    136  |  97  |  12  ----------------------------<  164<H>  4.0   |  29  |  0.64    Ca    9.0      21 Jun 2021 07:05          CAPILLARY BLOOD GLUCOSE          RADIOLOGY & ADDITIONAL TESTS: Reviewed.    ASSESSMENT:    PLAN:

## 2021-06-21 NOTE — DISCHARGE NOTE PROVIDER - HOSPITAL COURSE
Admitted- 6-  Surgery- s/p Right knee explant and space  Christiane-op Antibiotics  Pain control  DVT prophylaxis  OOB/Physical Therapy     Admitted- 6-  Surgery- s/p Right knee explant and spacer  IV Antibiotics  Infection Disease Consult   Medicine Consult  PICC Line Placement  Pain control  DVT prophylaxis  OOB/Physical Therapy

## 2021-06-21 NOTE — DISCHARGE NOTE PROVIDER - PROVIDER TOKENS
PROVIDER:[TOKEN:[[id number]],FOLLOWUP:[2 weeks]] PROVIDER:[TOKEN:[[id number]],FOLLOWUP:[1 week]] PROVIDER:[TOKEN:[[id number]],FOLLOWUP:[1 week]],PROVIDER:[TOKEN:[[id number]],FOLLOWUP:[2 weeks]]

## 2021-06-21 NOTE — PROGRESS NOTE ADULT - SUBJECTIVE AND OBJECTIVE BOX
INTERVAL HPI/OVERNIGHT EVENTS: [place] ROS[place].    CONSTITUTIONAL:  Negative fever or chills, feels well, good appetite  EYES:  Negative  blurry vision or double vision  CARDIOVASCULAR:  Negative for chest pain or palpitations  RESPIRATORY:  Negative for cough, wheezing, or SOB   GASTROINTESTINAL:  Negative for nausea, vomiting, diarrhea, constipation, or abdominal pain  GENITOURINARY:  Negative frequency, urgency or dysuria  NEUROLOGIC:  No headache, confusion, dizziness, lightheadedness      ANTIBIOTICS/RELEVANT:    MEDICATIONS  (STANDING):  acetaminophen   Tablet .. 975 milliGRAM(s) Oral every 8 hours  aspirin enteric coated 81 milliGRAM(s) Oral two times a day  cefepime   IVPB 2000 milliGRAM(s) IV Intermittent every 12 hours  celecoxib 200 milliGRAM(s) Oral every 12 hours  famotidine    Tablet 20 milliGRAM(s) Oral every 12 hours  folic acid 1 milliGRAM(s) Oral daily  lactated ringers. 1000 milliLiter(s) (100 mL/Hr) IV Continuous <Continuous>  multivitamin 1 Tablet(s) Oral daily  senna 2 Tablet(s) Oral at bedtime  vancomycin  IVPB 1500 milliGRAM(s) IV Intermittent every 12 hours    MEDICATIONS  (PRN):  HYDROmorphone  Injectable 0.5 milliGRAM(s) IV Push every 4 hours PRN breakthrough  HYDROmorphone  Injectable 0.5 milliGRAM(s) IV Push every 15 minutes PRN pacu  magnesium hydroxide Suspension 30 milliLiter(s) Oral daily PRN Constipation  methocarbamol 500 milliGRAM(s) Oral every 8 hours PRN Muscle Spasm  ondansetron Injectable 4 milliGRAM(s) IV Push every 6 hours PRN Nausea and/or Vomiting  oxyCODONE    IR 5 milliGRAM(s) Oral every 4 hours PRN Moderate Pain (4 - 6)  oxyCODONE    IR 10 milliGRAM(s) Oral every 4 hours PRN Severe Pain (7 - 10)  psyllium Powder 1 Packet(s) Oral two times a day PRN constipation        Vital Signs Last 24 Hrs  T(C): 36.8 (21 Jun 2021 13:44), Max: 36.8 (20 Jun 2021 16:43)  T(F): 98.2 (21 Jun 2021 13:44), Max: 98.3 (20 Jun 2021 16:43)  HR: 89 (21 Jun 2021 13:44) (75 - 89)  BP: 104/70 (21 Jun 2021 13:44) (104/70 - 138/87)  BP(mean): 95 (20 Jun 2021 20:23) (95 - 95)  RR: 18 (21 Jun 2021 13:44) (17 - 18)  SpO2: 96% (21 Jun 2021 13:44) (93% - 97%)    PHYSICAL EXAM:  Constitutional:Well-developed, well nourished  Eyes:EVY, EOMI  Ear/Nose/Throat: no oral lesion, no sinus tenderness on percussion	  Neck:no JVD, no lymphadenopathy, supple  Respiratory: CTA karen  Cardiovascular: S1S2 RRR, no murmurs  Gastrointestinal:soft, (+) BS, no HSM  Extremities: R knee brace  Vascular: DP Pulse:	right normal; left normal      LABS:                        10.1   7.40  )-----------( 337      ( 21 Jun 2021 07:05 )             32.6     06-21    136  |  97  |  12  ----------------------------<  164<H>  4.0   |  29  |  0.64    Ca    9.0      21 Jun 2021 07:05            MICROBIOLOGY: reviewed    RADIOLOGY & ADDITIONAL STUDIES: reviewed

## 2021-06-21 NOTE — DISCHARGE NOTE PROVIDER - NSDCMRMEDTOKEN_GEN_ALL_CORE_FT
cbc, cmp, esr, crp, vanco trough weekly labs: fax to Dr. Ansari [phone]  cefepime 2 g intravenous injection: 2 gram(s) intravenously every 12 hours   start date 6-18 for 6 weeks, stop date 7- MDD:2    heparin flush 3ml, administer after each infusion:   normal saline 10ml admister after each infusion:    cbc, cmp, esr, crp, vanco trough weekly labs: fax to Dr. Ansari [phone]  cefepime 2 g intravenous injection: 2 gram(s) intravenously every 12 hours   start date 6-18 for 6 weeks, stop date 7- MDD:2    heparin flush 3ml, administer after each infusion:   normal saline 10ml admister after each infusion:   vancomycin 1.5 g intravenous injection: 1.5 gram(s) intravenous every 12 hours MDD:2  start date 6-, for 6 weeks end date 7-   acetaminophen 325 mg oral tablet: 2 tab(s) orally every 8 hours as needed for mild pain  cbc, cmp, esr, crp, vanco trough weekly labs: fax to Dr. Ansari [phone]  cefepime 2 g intravenous injection: 2 gram(s) intravenously every 12 hours   start date 6-18 for 6 weeks, stop date 7- MDD:2    CeleBREX 200 mg oral capsule: 1 cap(s) orally 2 times a day   heparin flush 3ml, administer after each infusion:   normal saline 10ml admister after each infusion:   oxyCODONE 30 mg oral tablet: 1 tab(s) orally every 6 hours, As Needed for severe pain  vancomycin 1.5 g intravenous injection: 1.5 gram(s) intravenous every 12 hours MDD:2  start date 6-, for 6 weeks end date 7-

## 2021-06-21 NOTE — PROGRESS NOTE ADULT - SUBJECTIVE AND OBJECTIVE BOX
Pain Management Progress Note - [facility] [phone]    HPI: Patient seen and examined today. Patient tolerable level of right knee pain today, rating the pain a 6-7 out of 10. Patient reports the pain increases with movement. Patient reports feeling nausea and dizziness with home dose of Oxycodone (30 mg PO QID). Patient reports she was switched to Oxycodone 5-10 mg PO Q4h PRN moderate-severe pain over the weekend and reports less side effects with this dose. Patient still endorsing right knee pain but reports that pain is adequately managed. Patient reports endorsing some nausea, but denies other side effects from current pain regimen.    Pertinent PMH: Pain at: ___Back ___Neck__X_Knee ___Hip ___Shoulder __X_ Opioid tolerance    Pain is ___X sharp ____dull ___burning ___achy ___ Intensity: ____ mild ___X_mod __X__severe     Location ____X_surgical site _____cervical _____lumbar ____abd _____upper ext___X_lower ext    Worse with __X__activity __X__movement _____physical therapy___ Rest    Improved with X____medication __X__rest ____physical therapy    PMH:  None    Medications:  cefepime   IVPB  psyllium Powder  vancomycin  IVPB  methocarbamol  aspirin enteric coated  vancomycin  IVPB  HYDROmorphone  Injectable  cefepime   IVPB  vancomycin  IVPB  multivitamin  folic acid  senna  magnesium hydroxide Suspension  famotidine    Tablet  ondansetron Injectable  HYDROmorphone  Injectable  ketorolac   Injectable  celecoxib  acetaminophen   Tablet ..  lactated ringers.  BUpivacaine liposome 1.3% Injectable (no eMAR)  cefepime   IVPB  vancomycin  IVPB  lactated ringers.  ondansetron Injectable  aprepitant  gabapentin  celecoxib  acetaminophen   Tablet ..  chlorhexidine 2% Cloths  povidone iodine 5% Nasal Swab  lactated ringers.  oxyCODONE    IR  magnesium hydroxide Suspension  oxycodone    5 mG/acetaminophen 325 mG    ROS: Const:  N___febrile   Eyes:__N_ENT:___CV: _N__chest pain  Resp: ___N_sob  GI:_Y__nausea N___vomiting ___N_abd pain ___npo ___clears __Y_full diet __bm  :___N Musk: _Y__pain ___spasm  Skin:_N__ Neuro:  ___Nsedation__N_confusion___N_ numbness __N_weakness ___Nparesthesia  Psych:__N_anxiety  Endo:N___ Heme:__N_Allergy:_NKDA__    06-21 @ 07:0599 mL/min/1.73M2  Hemoglobin: 10.1 g/dL (06-21 @ 07:05)  Hemoglobin: 10.0 g/dL (06-20 @ 06:57)    T(C): 36.8 (06-21-21 @ 07:57), Max: 36.8 (06-20-21 @ 16:43)  HR: 87 (06-21-21 @ 07:57) (75 - 87)  BP: 123/91 (06-21-21 @ 07:57) (110/73 - 138/87)  RR: 17 (06-21-21 @ 07:57) (17 - 18)  SpO2: 97% (06-21-21 @ 07:57) (93% - 97%)  Wt(kg): --     PHYSICAL EXAM:  Gen Appearance: X___no acute distress __X_appropriate       Neuro: ___SILT feet____ EOM Intact Psych: AAOX_3_, ___Xmood/affect appropriate        Eyes: _X__conjunctiva WNL  __X___ Pupils equal and round        ENT: _X__ears and nose atraumatic_X__ Hearing grossly intact        Neck: __X_trachea midline, no visible masses ___thyroid without palpable mass    Resp: __X_Nml WOB____No tactile fremitus ___clear to auscultation    Cardio: ___extremities free from edema ____pedal pulses palpable    GI/Abdomen: ___soft _____ Nontender____X__Nondistended_____HSM    Lymphatic: ___no palpable nodes in neck  ___no palpable nodes calves and feet    Skin/Wound: ___Incision, _X__Dressing c/d/i,   _X___surrounding tissues soft,  ___drain/chest tube present____    Muscular: EHL __5_/5  Gastrocnemius_5__/5    X___absent clubbing/cyanosis         ASSESSMENT:  This is a 55y old Female with no PMH POD #3 s/o right total knee revision, doing well.     Recommended Treatment PLAN:  1. Continue Celebrex 200 mg PO BID  2. Continue Tylenol 975 mg PO TID  3. Continue Oxycodone 5-10 mg PO Q4h PRN moderate-severe pain  4. Continue Dilaudid 0.5 mg IVP Q4h PRN breakthrough pain  5. Continue Robaxin 500 mg PO Q8h PRN muscle spasm  Plan discussed with Dr Smith      Pain Management Progress Note - [facility] [phone]    HPI: Patient seen and examined today. Patient tolerable level of right knee pain today, rating the pain a 6-7 out of 10. Patient reports the pain increases with movement. Patient reports feeling nausea and dizziness with home dose of Oxycodone (30 mg PO QID). Patient reports she was switched to Oxycodone 5-10 mg PO Q4h PRN moderate-severe pain over the weekend and reports less side effects with this dose. Patient still endorsing right knee pain but reports that pain is adequately managed. Patient reports endorsing some nausea, but denies other side effects from current pain regimen.    Pertinent PMH: Pain at: ___Back ___Neck__X_Knee ___Hip ___Shoulder __X_ Opioid tolerance    Pain is ___X sharp ____dull ___burning ___achy ___ Intensity: ____ mild ___X_mod __X__severe     Location ____X_surgical site _____cervical _____lumbar ____abd _____upper ext___X_lower ext    Worse with __X__activity __X__movement _____physical therapy___ Rest    Improved with X____medication __X__rest ____physical therapy    PMH:  None    Medications:  cefepime   IVPB  psyllium Powder  vancomycin  IVPB  methocarbamol  aspirin enteric coated  vancomycin  IVPB  HYDROmorphone  Injectable  cefepime   IVPB  vancomycin  IVPB  multivitamin  folic acid  senna  magnesium hydroxide Suspension  famotidine    Tablet  ondansetron Injectable  HYDROmorphone  Injectable  ketorolac   Injectable  celecoxib  acetaminophen   Tablet ..  lactated ringers.  BUpivacaine liposome 1.3% Injectable (no eMAR)  cefepime   IVPB  vancomycin  IVPB  lactated ringers.  ondansetron Injectable  aprepitant  gabapentin  celecoxib  acetaminophen   Tablet ..  chlorhexidine 2% Cloths  povidone iodine 5% Nasal Swab  lactated ringers.  oxyCODONE    IR  magnesium hydroxide Suspension  oxycodone    5 mG/acetaminophen 325 mG    ROS: Const:  N___febrile   Eyes:__N_ENT:___CV: _N__chest pain  Resp: ___N_sob  GI:_Y__nausea N___vomiting ___N_abd pain ___npo ___clears __Y_full diet __bm  :___N Musk: _Y__pain ___spasm  Skin:_N__ Neuro:  ___Nsedation__N_confusion___N_ numbness __N_weakness ___Nparesthesia  Psych:__N_anxiety  Endo:N___ Heme:__N_Allergy:_NKDA__    06-21 @ 07:0599 mL/min/1.73M2  Hemoglobin: 10.1 g/dL (06-21 @ 07:05)  Hemoglobin: 10.0 g/dL (06-20 @ 06:57)    T(C): 36.8 (06-21-21 @ 07:57), Max: 36.8 (06-20-21 @ 16:43)  HR: 87 (06-21-21 @ 07:57) (75 - 87)  BP: 123/91 (06-21-21 @ 07:57) (110/73 - 138/87)  RR: 17 (06-21-21 @ 07:57) (17 - 18)  SpO2: 97% (06-21-21 @ 07:57) (93% - 97%)  Wt(kg): --     PHYSICAL EXAM:  Gen Appearance: X___no acute distress __X_appropriate       Neuro: ___SILT feet____ EOM Intact Psych: AAOX_3_, ___Xmood/affect appropriate        Eyes: _X__conjunctiva WNL  __X___ Pupils equal and round        ENT: _X__ears and nose atraumatic_X__ Hearing grossly intact        Neck: __X_trachea midline, no visible masses ___thyroid without palpable mass    Resp: __X_Nml WOB____No tactile fremitus ___clear to auscultation    Cardio: ___extremities free from edema ____pedal pulses palpable    GI/Abdomen: ___soft _____ Nontender____X__Nondistended_____HSM    Lymphatic: ___no palpable nodes in neck  ___no palpable nodes calves and feet    Skin/Wound: ___Incision, _X__Dressing c/d/i,   _X___surrounding tissues soft,  ___drain/chest tube present____    Muscular: EHL __5_/5  Gastrocnemius_5__/5    X___absent clubbing/cyanosis         ASSESSMENT:  This is a 55y old Female with no PMH POD #3 s/o right total knee revision, doing well.     Recommended Treatment PLAN:  1. Continue Celebrex 200 mg PO BID  2. Continue Tylenol 975 mg PO TID  3. Continue Oxycodone 5-10 mg PO Q4h PRN moderate-severe pain. Monitor for signs and symptoms of opioid withdrawal as patient was on much higher dose of opioids at home. If s/s present, consider increasing to Oxycodone 10-20 mg PO Q4h PRN moderate-severe pain  4. Continue Dilaudid 0.5 mg IVP Q4h PRN breakthrough pain  5. Continue Robaxin 500 mg PO Q8h PRN muscle spasm  Plan discussed with Dr Smith

## 2021-06-21 NOTE — PROGRESS NOTE ADULT - SUBJECTIVE AND OBJECTIVE BOX
Ortho Note    Procedure: R TKA explant, antibiotic spacer  Surgeon: Oh    Pt reports increased pain yesterday as she was unaware she had to ask for prn medications but controlled this morning. Denies CP, SOB, N/V, numbness/tingling. Drain removed yesterday.      Vital Signs Last 24 Hrs  T(C): 36.7 (21 Jun 2021 05:10), Max: 36.8 (20 Jun 2021 08:36)  T(F): 98.1 (21 Jun 2021 05:10), Max: 98.3 (20 Jun 2021 08:36)  HR: 87 (21 Jun 2021 05:10) (75 - 87)  BP: 110/73 (21 Jun 2021 05:10) (107/67 - 138/87)  BP(mean): 95 (20 Jun 2021 20:23) (95 - 95)  RR: 17 (21 Jun 2021 05:10) (17 - 18)  SpO2: 93% (21 Jun 2021 05:10) (93% - 97%)    Physical Exam:  General: Resting comfortably in bed. AAOx3. NAD.  Extremities:   RLE: Knee immobilizer in place. Prevena intact w/ good function. SILT s/s/sp/dp/t. TA/EHL/FHL/GS motor intact. 2+ PT ,     A/P: 55yFemale  s/p R TKA explant, antibiotic spacer implant on 6/18.  - Stable  - Pain Control  - DVT ppx: ASA  - Post op abx: Vanc + cefepime  - PT, WBS: 50% PWB x6 weeks, w/ KI for first 2 weeks   - Dispo: [facility]     Ortho Pager [phone]

## 2021-06-22 LAB
ANION GAP SERPL CALC-SCNC: 9 MMOL/L — SIGNIFICANT CHANGE UP (ref 5–17)
BUN SERPL-MCNC: 10 MG/DL — SIGNIFICANT CHANGE UP (ref 7–23)
CALCIUM SERPL-MCNC: 8.1 MG/DL — LOW (ref 8.4–10.5)
CHLORIDE SERPL-SCNC: 94 MMOL/L — LOW (ref 96–108)
CO2 SERPL-SCNC: 29 MMOL/L — SIGNIFICANT CHANGE UP (ref 22–31)
CREAT SERPL-MCNC: 0.6 MG/DL — SIGNIFICANT CHANGE UP (ref 0.5–1.3)
CULTURE RESULTS: SIGNIFICANT CHANGE UP
CULTURE RESULTS: SIGNIFICANT CHANGE UP
GLUCOSE SERPL-MCNC: 116 MG/DL — HIGH (ref 70–99)
HCT VFR BLD CALC: 32 % — LOW (ref 34.5–45)
HCV RNA FLD QL NAA+PROBE: SIGNIFICANT CHANGE UP
HGB BLD-MCNC: 10 G/DL — LOW (ref 11.5–15.5)
MCHC RBC-ENTMCNC: 28.1 PG — SIGNIFICANT CHANGE UP (ref 27–34)
MCHC RBC-ENTMCNC: 31.3 GM/DL — LOW (ref 32–36)
MCV RBC AUTO: 89.9 FL — SIGNIFICANT CHANGE UP (ref 80–100)
NRBC # BLD: 0 /100 WBCS — SIGNIFICANT CHANGE UP (ref 0–0)
PLATELET # BLD AUTO: 317 K/UL — SIGNIFICANT CHANGE UP (ref 150–400)
POTASSIUM SERPL-MCNC: 4.2 MMOL/L — SIGNIFICANT CHANGE UP (ref 3.5–5.3)
POTASSIUM SERPL-SCNC: 4.2 MMOL/L — SIGNIFICANT CHANGE UP (ref 3.5–5.3)
RBC # BLD: 3.56 M/UL — LOW (ref 3.8–5.2)
RBC # FLD: 13.3 % — SIGNIFICANT CHANGE UP (ref 10.3–14.5)
SODIUM SERPL-SCNC: 132 MMOL/L — LOW (ref 135–145)
SPECIMEN SOURCE: SIGNIFICANT CHANGE UP
SPECIMEN SOURCE: SIGNIFICANT CHANGE UP
WBC # BLD: 8.01 K/UL — SIGNIFICANT CHANGE UP (ref 3.8–10.5)
WBC # FLD AUTO: 8.01 K/UL — SIGNIFICANT CHANGE UP (ref 3.8–10.5)

## 2021-06-22 PROCEDURE — 36569 INSJ PICC 5 YR+ W/O IMAGING: CPT

## 2021-06-22 PROCEDURE — 99232 SBSQ HOSP IP/OBS MODERATE 35: CPT

## 2021-06-22 RX ORDER — SODIUM CHLORIDE 9 MG/ML
1000 INJECTION INTRAMUSCULAR; INTRAVENOUS; SUBCUTANEOUS
Refills: 0 | Status: DISCONTINUED | OUTPATIENT
Start: 2021-06-22 | End: 2021-06-25

## 2021-06-22 RX ORDER — OXYCODONE HYDROCHLORIDE 5 MG/1
10 TABLET ORAL
Refills: 0 | Status: DISCONTINUED | OUTPATIENT
Start: 2021-06-22 | End: 2021-06-25

## 2021-06-22 RX ORDER — OXYCODONE HYDROCHLORIDE 5 MG/1
5 TABLET ORAL
Refills: 0 | Status: DISCONTINUED | OUTPATIENT
Start: 2021-06-22 | End: 2021-06-25

## 2021-06-22 RX ORDER — CHLORHEXIDINE GLUCONATE 213 G/1000ML
1 SOLUTION TOPICAL
Refills: 0 | Status: DISCONTINUED | OUTPATIENT
Start: 2021-06-22 | End: 2021-06-25

## 2021-06-22 RX ORDER — SODIUM CHLORIDE 9 MG/ML
10 INJECTION INTRAMUSCULAR; INTRAVENOUS; SUBCUTANEOUS
Refills: 0 | Status: DISCONTINUED | OUTPATIENT
Start: 2021-06-22 | End: 2021-06-25

## 2021-06-22 RX ORDER — VANCOMYCIN HCL 1 G
1.5 VIAL (EA) INTRAVENOUS
Qty: 76 | Refills: 0
Start: 2021-06-22 | End: 2021-07-29

## 2021-06-22 RX ORDER — SODIUM CHLORIDE 9 MG/ML
500 INJECTION, SOLUTION INTRAVENOUS ONCE
Refills: 0 | Status: COMPLETED | OUTPATIENT
Start: 2021-06-22 | End: 2021-06-22

## 2021-06-22 RX ADMIN — Medication 1 MILLIGRAM(S): at 11:03

## 2021-06-22 RX ADMIN — CEFEPIME 100 MILLIGRAM(S): 1 INJECTION, POWDER, FOR SOLUTION INTRAMUSCULAR; INTRAVENOUS at 05:57

## 2021-06-22 RX ADMIN — OXYCODONE HYDROCHLORIDE 10 MILLIGRAM(S): 5 TABLET ORAL at 20:36

## 2021-06-22 RX ADMIN — HYDROMORPHONE HYDROCHLORIDE 0.5 MILLIGRAM(S): 2 INJECTION INTRAMUSCULAR; INTRAVENOUS; SUBCUTANEOUS at 18:00

## 2021-06-22 RX ADMIN — Medication 975 MILLIGRAM(S): at 05:57

## 2021-06-22 RX ADMIN — CELECOXIB 200 MILLIGRAM(S): 200 CAPSULE ORAL at 17:22

## 2021-06-22 RX ADMIN — FAMOTIDINE 20 MILLIGRAM(S): 10 INJECTION INTRAVENOUS at 05:57

## 2021-06-22 RX ADMIN — HYDROMORPHONE HYDROCHLORIDE 0.5 MILLIGRAM(S): 2 INJECTION INTRAMUSCULAR; INTRAVENOUS; SUBCUTANEOUS at 04:52

## 2021-06-22 RX ADMIN — Medication 300 MILLIGRAM(S): at 21:30

## 2021-06-22 RX ADMIN — Medication 975 MILLIGRAM(S): at 22:01

## 2021-06-22 RX ADMIN — SODIUM CHLORIDE 80 MILLILITER(S): 9 INJECTION INTRAMUSCULAR; INTRAVENOUS; SUBCUTANEOUS at 19:24

## 2021-06-22 RX ADMIN — Medication 81 MILLIGRAM(S): at 17:22

## 2021-06-22 RX ADMIN — HYDROMORPHONE HYDROCHLORIDE 0.5 MILLIGRAM(S): 2 INJECTION INTRAMUSCULAR; INTRAVENOUS; SUBCUTANEOUS at 17:22

## 2021-06-22 RX ADMIN — CELECOXIB 200 MILLIGRAM(S): 200 CAPSULE ORAL at 06:27

## 2021-06-22 RX ADMIN — Medication 81 MILLIGRAM(S): at 05:57

## 2021-06-22 RX ADMIN — SENNA PLUS 2 TABLET(S): 8.6 TABLET ORAL at 21:30

## 2021-06-22 RX ADMIN — OXYCODONE HYDROCHLORIDE 10 MILLIGRAM(S): 5 TABLET ORAL at 09:40

## 2021-06-22 RX ADMIN — OXYCODONE HYDROCHLORIDE 10 MILLIGRAM(S): 5 TABLET ORAL at 09:11

## 2021-06-22 RX ADMIN — OXYCODONE HYDROCHLORIDE 10 MILLIGRAM(S): 5 TABLET ORAL at 21:06

## 2021-06-22 RX ADMIN — Medication 975 MILLIGRAM(S): at 22:31

## 2021-06-22 RX ADMIN — Medication 975 MILLIGRAM(S): at 06:27

## 2021-06-22 RX ADMIN — CEFEPIME 100 MILLIGRAM(S): 1 INJECTION, POWDER, FOR SOLUTION INTRAMUSCULAR; INTRAVENOUS at 17:22

## 2021-06-22 RX ADMIN — FAMOTIDINE 20 MILLIGRAM(S): 10 INJECTION INTRAVENOUS at 17:22

## 2021-06-22 RX ADMIN — Medication 300 MILLIGRAM(S): at 09:12

## 2021-06-22 RX ADMIN — CELECOXIB 200 MILLIGRAM(S): 200 CAPSULE ORAL at 18:00

## 2021-06-22 RX ADMIN — Medication 975 MILLIGRAM(S): at 13:30

## 2021-06-22 RX ADMIN — CELECOXIB 200 MILLIGRAM(S): 200 CAPSULE ORAL at 05:57

## 2021-06-22 RX ADMIN — Medication 975 MILLIGRAM(S): at 13:02

## 2021-06-22 RX ADMIN — OXYCODONE HYDROCHLORIDE 10 MILLIGRAM(S): 5 TABLET ORAL at 15:03

## 2021-06-22 RX ADMIN — CHLORHEXIDINE GLUCONATE 1 APPLICATION(S): 213 SOLUTION TOPICAL at 21:32

## 2021-06-22 RX ADMIN — HYDROMORPHONE HYDROCHLORIDE 0.5 MILLIGRAM(S): 2 INJECTION INTRAMUSCULAR; INTRAVENOUS; SUBCUTANEOUS at 05:07

## 2021-06-22 RX ADMIN — Medication 1 TABLET(S): at 11:03

## 2021-06-22 RX ADMIN — ONDANSETRON 4 MILLIGRAM(S): 8 TABLET, FILM COATED ORAL at 09:11

## 2021-06-22 RX ADMIN — OXYCODONE HYDROCHLORIDE 10 MILLIGRAM(S): 5 TABLET ORAL at 16:00

## 2021-06-22 NOTE — PROGRESS NOTE ADULT - SUBJECTIVE AND OBJECTIVE BOX
Ortho Note    Subjective:  Pt comfortable without complaints, pain controlled with current pain medication regimen. Patient Right knee in a knee immobilizer.  Patient for PICC line today.   Denies CP, SOB, N/V, numbness/tingling       Vital Signs Last 24 Hrs  T(C): 36.6 (06-22-21 @ 08:20), Max: 36.8 (06-22-21 @ 05:05)  T(F): 97.8 (06-22-21 @ 08:20), Max: 98.2 (06-22-21 @ 05:05)  HR: 93 (06-22-21 @ 08:20) (90 - 93)  BP: 100/60 (06-22-21 @ 08:20) (98/60 - 102/63)  BP(mean): 73 (06-22-21 @ 05:05) (73 - 73)  RR: 18 (06-22-21 @ 08:20) (17 - 18)  SpO2: 94% (06-22-21 @ 08:20) (94% - 98%)  AVSS    Objective:    Physical Exam:  General: Pt Alert and oriented, NAD  Right knee DSG C/D/I  Pulses: +2 pedal pulses, wwp toes, cap refill less than 3 seconds  Sensation: SILT intact  Motor: EHL/FHL/TA/GS- firing        Plan of Care:  A/P: 55yFemale admitted with R infected TKR, Left infected TKR, s/p Right knee explant and space, Left Knee aspiration   - afebrile, nontoxic appearance  - Pain Control- Oxycodone 5-10mg PO Q4h prn moderate to severe pain, Dilaudid 0.5mg Q4h prn breakthrough pain, methocarbamol 500mg PO TID, gabapentin 100mg PO TID, tylenol 1000mg PO Q8h, celebrex 200mg PO Q12h  - DVT ppx: SCDS  - PT, WBS: WBAT  - Appreciate ID Recs  - Appreciate Medicine Recs  - Vancomycin 1500mg Q12 IV and Cefepime IV Q8h   - Picc line placement today.   - Dispo: pending picc placement, insurance auth of home infusion and antibiotics      Ortho Pager [phone] Ortho Note    Subjective:  Pt comfortable without complaints, pain controlled with current pain medication regimen. Patient Right knee in a knee immobilizer. Patient for PICC line today.   Denies CP, SOB, N/V, numbness/tingling       Vital Signs Last 24 Hrs  T(C): 36.6 (06-22-21 @ 08:20), Max: 36.8 (06-22-21 @ 05:05)  T(F): 97.8 (06-22-21 @ 08:20), Max: 98.2 (06-22-21 @ 05:05)  HR: 93 (06-22-21 @ 08:20) (90 - 93)  BP: 100/60 (06-22-21 @ 08:20) (98/60 - 102/63)  BP(mean): 73 (06-22-21 @ 05:05) (73 - 73)  RR: 18 (06-22-21 @ 08:20) (17 - 18)  SpO2: 94% (06-22-21 @ 08:20) (94% - 98%)  AVSS    Objective:    Physical Exam:  General: Pt Alert and oriented, NAD  Right knee DSG C/D/I  Pulses: +2 pedal pulses, wwp toes, cap refill less than 3 seconds  Sensation: SILT intact  Motor: EHL/FHL/TA/GS- firing        Plan of Care:  A/P: 55yFemale admitted with R infected TKR, Left infected TKR, s/p Right knee explant and space, Left Knee aspiration   - afebrile, nontoxic appearance  - , increase oral salt intake, urine sodium, urine osmolality   - Pain Control- Oxycodone 5-10mg PO Q4h prn moderate to severe pain, Dilaudid 0.5mg Q4h prn breakthrough pain, methocarbamol 500mg PO TID, gabapentin 100mg PO TID, tylenol 1000mg PO Q8h, celebrex 200mg PO Q12h  - DVT ppx: SCDS  - PT, WBS: WBAT  - Appreciate ID Recs  - Appreciate Medicine Recs  - Vancomycin 1500mg Q12 IV and Cefepime IV Q8h   - Picc line placement today.   - Dispo: pending picc placement, insurance auth of home infusion and antibiotics      Ortho Pager [phone]

## 2021-06-22 NOTE — PROGRESS NOTE ADULT - SUBJECTIVE AND OBJECTIVE BOX
OVERNIGHT EVENTS:    SUBJECTIVE / INTERVAL HPI: Patient seen and examined at bedside. Pain controlled, awaiting PICC.     VITAL SIGNS:  Vital Signs Last 24 Hrs  T(C): 36.7 (22 Jun 2021 13:06), Max: 36.9 (21 Jun 2021 20:15)  T(F): 98.1 (22 Jun 2021 13:06), Max: 98.4 (21 Jun 2021 20:15)  HR: 101 (22 Jun 2021 15:00) (90 - 101)  BP: 99/59 (22 Jun 2021 15:00) (94/58 - 120/76)  BP(mean): 73 (22 Jun 2021 05:05) (73 - 91)  RR: 18 (22 Jun 2021 15:00) (16 - 18)  SpO2: 95% (22 Jun 2021 15:00) (94% - 98%)    PHYSICAL EXAM:    General: WDWN  HEENT: NC/AT;   Neck: supple  Cardiovascular: +S1/S2; RRR  Respiratory: CTA b/l; no W/R/R  Gastrointestinal: soft, NT/ND; +BSx4  Extremities: WWP; R knee brace, L knee w/ scar      MEDICATIONS:  MEDICATIONS  (STANDING):  acetaminophen   Tablet .. 975 milliGRAM(s) Oral every 8 hours  aspirin enteric coated 81 milliGRAM(s) Oral two times a day  cefepime   IVPB 2000 milliGRAM(s) IV Intermittent every 12 hours  celecoxib 200 milliGRAM(s) Oral every 12 hours  chlorhexidine 2% Cloths 1 Application(s) Topical <User Schedule>  famotidine    Tablet 20 milliGRAM(s) Oral every 12 hours  folic acid 1 milliGRAM(s) Oral daily  lactated ringers. 1000 milliLiter(s) (100 mL/Hr) IV Continuous <Continuous>  multivitamin 1 Tablet(s) Oral daily  senna 2 Tablet(s) Oral at bedtime  vancomycin  IVPB 1500 milliGRAM(s) IV Intermittent every 12 hours    MEDICATIONS  (PRN):  HYDROmorphone  Injectable 0.5 milliGRAM(s) IV Push every 15 minutes PRN pacu  HYDROmorphone  Injectable 0.5 milliGRAM(s) IV Push every 4 hours PRN breakthrough  magnesium hydroxide Suspension 30 milliLiter(s) Oral daily PRN Constipation  methocarbamol 500 milliGRAM(s) Oral every 8 hours PRN Muscle Spasm  ondansetron Injectable 4 milliGRAM(s) IV Push every 6 hours PRN Nausea and/or Vomiting  oxyCODONE    IR 5 milliGRAM(s) Oral every 3 hours PRN Moderate Pain (4 - 6)  oxyCODONE    IR 10 milliGRAM(s) Oral every 3 hours PRN Severe Pain (7 - 10)  psyllium Powder 1 Packet(s) Oral two times a day PRN constipation  sodium chloride 0.9% lock flush 10 milliLiter(s) IV Push every 1 hour PRN Pre/post blood products, medications, blood draw, and to maintain line patency      ALLERGIES:  Allergies    No Known Allergies    Intolerances        LABS:                        10.0   8.01  )-----------( 317      ( 22 Jun 2021 07:11 )             32.0     06-22    132<L>  |  94<L>  |  10  ----------------------------<  116<H>  4.2   |  29  |  0.60    Ca    8.1<L>      22 Jun 2021 07:11          CAPILLARY BLOOD GLUCOSE          RADIOLOGY & ADDITIONAL TESTS: Reviewed.    ASSESSMENT:    PLAN:

## 2021-06-22 NOTE — CONSULT NOTE ADULT - SUBJECTIVE AND OBJECTIVE BOX
Pain Management Consult Note - [facility] [phone]    Chief Complaint: Knee pain     HPI: Patient seen and examined today. This is a 56 y/o female s/p R TKA in 2019 at [facility] and L TKA in 2015 at [facility]. Patient admitted for right knee pain, found to have right knee infection. Patient reports significant pain in the bilateral knees, stating right is worse than left knee. Patient report she is unable to walk without significant pain. At home, patient reports taking Oxycodone 30 mg PO Q6 hr PRN, however she states she has not taken it in the past week. Patient I-STOP positive for Oxycodone 30 mg PO Q6 hr PRN prescribed by Dr. Feliz. Patient scheduled for OR tomorrow (6/18/21) for right knee irrigation and debridement and explant spacer with Dr. Fay.    Pain is ___X sharp ____dull ___burning __achy ___ Intensity: ____ mild ___mod __X_severe     Location ___X_surgical site ____cervical _____lumbar ____abd ____upper ext__X__lower ext    Worse with __X__activity __X__movement _____physical therapy___ Rest    Improved with __X__medication __X__rest ____physical therapy    ROS: Const:  __N_febrile   Eyes:__N_ENT:___CV: _N__chest pain  Resp: __N__sob  GI:__N_nausea N___vomiting __N_abd pain ___npo ___clears __full diet __bm  :___N Musk: __Y_pain _N__spasm  Skin:N___ Neuro:  _N__sedation_N__confusion_N__ numbness _N__weakness _N__paresth  Psych:_N_anxiety  Endo:___ Heme:___Allergy:___NKDA______, __Y_all others reviewed and negative    PAST MEDICAL & SURGICAL HISTORY:    SH: __Y_Tobacco   ___NAlcohol                          FH:FAMILY HISTORY: No pertinent medical history in first degree relatives    lactated ringers. 1000 milliLiter(s) IV Continuous <Continuous>  magnesium hydroxide Suspension 30 milliLiter(s) Oral daily PRN  oxyCODONE    IR 30 milliGRAM(s) Oral every 6 hours PRN      T(C): 36.7 (06-17-21 @ 10:12), Max: 36.7 (06-17-21 @ 10:12)  HR: 89 (06-17-21 @ 10:12) (89 - 89)  BP: 121/70 (06-17-21 @ 10:12) (121/70 - 121/70)  RR: 20 (06-17-21 @ 10:12) (20 - 20)  SpO2: 99% (06-17-21 @ 10:12) (99% - 99%)  Wt(kg): --    T(C): 36.7 (06-17-21 @ 10:12), Max: 36.7 (06-17-21 @ 10:12)  HR: 89 (06-17-21 @ 10:12) (89 - 89)  BP: 121/70 (06-17-21 @ 10:12) (121/70 - 121/70)  RR: 20 (06-17-21 @ 10:12) (20 - 20)  SpO2: 99% (06-17-21 @ 10:12) (99% - 99%)  Wt(kg): --    T(C): 36.7 (06-17-21 @ 10:12), Max: 36.7 (06-17-21 @ 10:12)  HR: 89 (06-17-21 @ 10:12) (89 - 89)  BP: 121/70 (06-17-21 @ 10:12) (121/70 - 121/70)  RR: 20 (06-17-21 @ 10:12) (20 - 20)  SpO2: 99% (06-17-21 @ 10:12) (99% - 99%)  Wt(kg): --    PHYSICAL EXAM:  Gen Appearance: _X__no acute distress _X__appropriate        Neuro: ___SILT feet____ EOM Intact Psych: AAOX_3_, _X__mood/affect appropriate        Eyes: __X_conjunctiva WNL  ___X__ Pupils equal and round        ENT: __X_ears and nose atraumatic__X_ Hearing grossly intact        Neck: __X_trachea midline, no visible masses ___thyroid without palpable mass    Resp: __X_Nml WOB____No tactile fremitus ___clear to auscultation    Cardio: X___extremities free from edema ____pedal pulses palpable    GI/Abdomen: ___soft _____ Nontender_____X_Nondistended_____HSM    Lymphatic: ___no palpable nodes in neck  ___no palpable nodes calves and feet    Skin/Wound: ___Incision, ___Dressing c/d/i,   ____surrounding tissues soft,  ___drain/chest tube present____    Muscular: EHL _4__/5  Gastrocnemius__4_/5    _X__absent clubbing/cyanosis      ASSESSMENT: This is a 55y old Female with no PMH with complaints of right knee, found to have right knee infection.     Recommended Treatment PLAN:  1. Continue Oxycodone 30 mg PO Q6h PRN severe pain  2. Patient scheduled for OR tomorrow (6/18/21). Post-operatively, consider starting patient on the following regimen:  - Oxycodone 30 mg PO Q4 hr PRN severe pain  - Dilaudid 1 mg IVP Q2h PRN breakthrough pain  - Flexeril 5 mg PO Q8h PRN muscle spasm  - Tylenol 975 mg PO TID  Plan discussed with Dr. Espinoza                    
Medicine Consult Initial Note:     HPI:  54yo female with hx of asthma, L knee septic arthritis 2012, L TKA 2015, R TKA 3/2019, presenting with worsening b/l knee pain and concern for R knee infection.  Medicine consulted for preop eval     On my interview, patient endorses sharp pain in both knees limiting ambulation. Pt denies any recent fever, chills.     Patient able to ambulate less than 1 block 2/2 pain in both knees, no chest pain/sob at rest or with exertion     12 point ROS reviewed and negative except as otherwise stated in HPI and below    PAST MEDICAL & SURGICAL HISTORY:    SOCIAL HISTORY:  Lives alone, apt [address] floor, no help wants HHA  Ambulates with crutches less than 1 block, needs walker    FAMILY HISTORY:  parents healthy     ALLERGIES:  No Known Allergies      HOME MEDICATIONS:  oxyCODONE 30 mg oral tablet: 1 tab(s) orally every 6 hours, As Needed for severe pain      Vital Signs Last 24 Hrs  T(F): 97.7 (17 Jun 2021 14:15), Max: 98.1 (17 Jun 2021 10:12)  HR: 89 (17 Jun 2021 14:15) (89 - 89)  BP: 112/69 (17 Jun 2021 14:15) (112/69 - 121/70)  RR: 18 (17 Jun 2021 14:15) (18 - 20)  SpO2: 98% (17 Jun 2021 14:15) (98% - 99%)    PHYSICAL EXAM:  GENERAL: pleasant, NAD  NEURO: AOx3, intact strength and sensation UE and LE  HEAD:  Atraumatic, Normocephalic  EYES: EOMI, conjunctiva and sclera clear  ENMT: Clear op, good dentition   CHEST/LUNG: Clear to auscultation bilaterally; No rales, rhonchi, wheezing, or rubs  HEART: Regular rate and rhythm; S1/S2, No murmurs, rubs, or gallops  ABDOMEN: Soft, Nontender, Nondistended, Normal BS  EXT: b/l knees wrapped, no sig le edema     LABS:                        12.5   7.31  )-----------( 466      ( 17 Jun 2021 11:38 )             40.7           PT/INR - ( 17 Jun 2021 11:38 )   PT: 12.6 sec;   INR: 1.05          PTT - ( 17 Jun 2021 11:38 )  PTT:35.8 sec      COVID-19 PCR: Negative (06-17-21 @ 11:46)    Synovial fluid studies reviewed       RADIOLOGY & ADDITIONAL TESTS:  CXR no acute findings  EKG T wave inversion V2, otherwise no q's, no acute ischemic changes     ASSESSMENT AND PLAN: 54yo female with hx of asthma, L knee septic arthritis 2012, L TKA 2015, R TKA 3/2019, presenting with worsening b/l knee pain and concern for R knee infection.  Medicine consulted for preop eval for OR 6/18 for right knee irrigation and debridement and explant spacer  #Preop eval- RCRI 0, low risk of periop mace, can proceed without further cardiac testing despite poor ET limited by b/l leg pain (not cp/sob)  #R knee infection- appreciate ID recs on abx s/p OR cx, stable for now  #Chronic b/l knee pain- on high dose opioid regimen at home- f/u pain management recs   #Asthma hx- clear lung exam, patient does not take inhalers at this time     #DVT px: per primary team  #Diet: regular diet    Patient was seen and examined by me at bedside    Greater than 110 minutes spent on total encounter; more than 50% of the visit was spent counseling and/or coordinating care by the attending physician.    Aris Mandel MD [phone]          
HPI:  56yo female s/p R TKA in 2019 at [facility] and L TKA in 2015 at [facility] pt states she has been having worsening atraumatic right knee pain with difficulty bearing weight starting in may.  Pt states that she takes oxycodone for her pain with some relief.  Pt states that prior to increase of her pain in May 2021 she has been able to ambulate without assistive device but since the progression of her pain she requires the use of crutches to ambulate.  Pt states that she has also had left knee pain that is a result of hardware loosening and states that her right knee pain is worse than her left knee pain at this time.  Pt denies any recent fever, chills, infections, antibiotic use, recent trauma, numbness or tingling down le b/l.   (17 Jun 2021 11:38)      PAST MEDICAL & SURGICAL HISTORY: b/l TKA        REVIEW OF SYSTEMS:    General:	 no weakness; no fevers, no chills  Skin/Breast: no rash  Respiratory and Thorax: no SOB, no cough  Cardiovascular:	No chest pain  Gastrointestinal:	 no nausea, vomiting , diarrhea  Genitourinary:	no dysuria, no difficulty urinating, no hematuria  Musculoskeletal:	no weakness, no joint swelling/pain  Neurological:	no focal weakness/numbness  Endocrine:	no polyuria, no polydipsia      ANTIBIOTICS:  MEDICATIONS  (STANDING):  lactated ringers. 1000 milliLiter(s) (120 mL/Hr) IV Continuous <Continuous>    MEDICATIONS  (PRN):  magnesium hydroxide Suspension 30 milliLiter(s) Oral daily PRN Constipation  oxyCODONE    IR 30 milliGRAM(s) Oral every 6 hours PRN Severe Pain (7 - 10)      Allergies    No Known Allergies    Intolerances        SOCIAL HISTORY: +tobacco    FAMILY HISTORY: noncontributory       Vital Signs Last 24 Hrs  T(C): 36.5 (17 Jun 2021 14:15), Max: 36.7 (17 Jun 2021 10:12)  T(F): 97.7 (17 Jun 2021 14:15), Max: 98.1 (17 Jun 2021 10:12)  HR: 89 (17 Jun 2021 14:15) (89 - 89)  BP: 112/69 (17 Jun 2021 14:15) (112/69 - 121/70)  BP(mean): --  RR: 18 (17 Jun 2021 14:15) (18 - 20)  SpO2: 98% (17 Jun 2021 14:15) (98% - 99%)    PHYSICAL EXAM:  Constitutional:Well-developed, well nourished  Eyes:EVY, EOMI  Ear/Nose/Throat: no oral lesion, no sinus tenderness on percussion	  Neck:no JVD, no lymphadenopathy, supple  Respiratory: CTA karen  Cardiovascular: S1S2 RRR, no murmurs  Gastrointestinal:soft, (+) BS, no HSM  Extremities:no e/e/c; wobbly gait  Vascular: DP Pulse:	right normal; left normal            LABS:                        12.5   7.31  )-----------( 466      ( 17 Jun 2021 11:38 )             40.7           PT/INR - ( 17 Jun 2021 11:38 )   PT: 12.6 sec;   INR: 1.05          PTT - ( 17 Jun 2021 11:38 )  PTT:35.8 sec      MICROBIOLOGY: 6/7 outpatient synovial fluid culture negative   RADIOLOGY & ADDITIONAL STUDIES: reviewed
Vascular Access Service Consult Note    55yFemaleHEALTH ISSUES - PROBLEM Dx:  Infection of prosthetic knee joint, initial encounter               Diagnosis: knee infection     Indications for Vascular Access (Check all that apply)  [X  ]  Antibiotic Therapy       Antibiotic Prescribed: vanc/cefepime                                                                            Expected Duration of Therapy: 6 weeks               [  ]  IV Hydration  [  ]  Total Parenteral Nutrition  [  ]  Chemotherapy  [  ]  Difficult Venous Access  [  ]  CVP monitoring  [  ]  Medications with high potential for tissue necrosis on extravasation  [  ]  Other    Screening (Check all that apply)  Previous Radiation to chest  [  ] Yes      [  X]  No  Breast Cancer                          [  ] Left     [  ]  Right    [ X ]  No  Lymph Node Dissection         [  ] Left     [  ]  Right    [  X]  No  Pacemaker or ICD                   [  ] Left     [  ]  Right    [ X ]  No  Upper Extremity DVT             [  ] Left     [  ]  Right    [  X]  No  Chronic Kidney Disease         [  ]  Yes     [ X ]  No  Hemodialysis                           [  ]  Yes     [ X ]  No  AV Fistula/ Graft                     [  ]  Left    [  ]  Right    [X  ]  No  Temp>101F in past 24 H       [  ]  Yes     [ X ]  No  H/O PICC/Midline                   [  ]  Yes     [ X ]  No    Lab data:                        10.0   8.01  )-----------( 317      ( 22 Jun 2021 07:11 )             32.0     06-22    132<L>  |  94<L>  |  10  ----------------------------<  116<H>  4.2   |  29  |  0.60    Ca    8.1<L>      22 Jun 2021 07:11                I have reviewed the chart, interviewed and examined the patient and determined that this patient:  [ X ] Is a candidate for a PICC line  [  ] Is a candidate for a Midline  [  ] Is not a candidate for vascular access device (reason)    Lumens:    [  ] Single  [X  ] Double

## 2021-06-22 NOTE — CONSULT NOTE ADULT - REASON FOR ADMISSION
R knee PJI, L knee hardware loosening

## 2021-06-22 NOTE — PROGRESS NOTE ADULT - SUBJECTIVE AND OBJECTIVE BOX
Pain Management Progress [facility] - [facility] [phone]    HPI: Patient seen and examined today. Patient POD 4 s/p right knee revision with Dr. Fay. Patient reports tolerable level of pain today, rating the pain a 7 out of 10. Patient reports the pain increases significantly with movement. Patient pain regimen reviewed at bedside and patient questions were answered. Discussed possibility of increasing to Oxycodone 5-10 mg PO Q3h PRN. Patient denies side effects from current pain regimen.    Pertinent PMH: Pain at: ___Back ___Neck_X__Knee ___Hip ___Shoulder _X_ Opioid tolerance    Pain is ___X sharp ____dull ___burning ___achy ___ Intensity: ____ mild ___X_mod __X__severe     Location _X____surgical site _____cervical _____lumbar ____abd _____upper ext___X_lower ext    Worse with __X__activity ___X_movement _____physical therapy___ Rest    Improved with _X___medication ____rest ____physical therapy    PMH:  None    Medications:  oxyCODONE    IR  chlorhexidine 2% Cloths  sodium chloride 0.9% lock flush  psyllium Powder  vancomycin  IVPB  methocarbamol  aspirin enteric coated  cefepime   IVPB  HYDROmorphone  Injectable  cefepime   IVPB  vancomycin  IVPB  multivitamin  folic acid  senna  magnesium hydroxide Suspension  famotidine    Tablet  ondansetron Injectable  HYDROmorphone  Injectable  ketorolac   Injectable  celecoxib  acetaminophen   Tablet ..  lactated ringers.  lactated ringers.  ondansetron Injectable  aprepitant  gabapentin  celecoxib  acetaminophen   Tablet ..  chlorhexidine 2% Cloths  povidone iodine 5% Nasal Swab  lactated ringers.  oxyCODONE    IR  magnesium hydroxide Suspension  oxycodone    5 mG/acetaminophen 325 mG    ROS: Const:  _N__febrile   Eyes:_N__ENT:___CV: _N__chest pain  Resp: __N__sob  GI:__N_nausea _N__vomiting __N__abd pain ___npo ___clears Y___full diet __bm  :NMusk: _Y__pain ___spasm  Skin:__N_ Neuro:  __N_sedation_N__confusionN____ numbness __N_weakness ___paresthesia  Psych:_N__anxiety  Endo:___N Heme:___Allergy:_NKDA__      06-22 @ 07:28727 mL/min/1.73M2  Hemoglobin: 10.0 g/dL (06-22 @ 07:11)  Hemoglobin: 10.1 g/dL (06-21 @ 07:05)    T(C): 36.6 (06-22-21 @ 08:20), Max: 36.9 (06-21-21 @ 20:15)  HR: 93 (06-22-21 @ 08:20) (89 - 94)  BP: 100/60 (06-22-21 @ 08:20) (98/60 - 120/76)  RR: 18 (06-22-21 @ 08:20) (17 - 18)  SpO2: 94% (06-22-21 @ 08:20) (94% - 98%)  Wt(kg): --     PHYSICAL EXAM:  Gen Appearance: _X__no acute distress _X__appropriate       Neuro: ___SILT feet____ EOM Intact Psych: AAOX_3_, __X_mood/affect appropriate        Eyes: _X__conjunctiva WNL  ___X__ Pupils equal and round        ENT: _X__ears and nose atraumatic__X_ Hearing grossly intact        Neck: _X__trachea midline, no visible masses ___thyroid without palpable mass    Resp: __X_Nml WOB____No tactile fremitus ___clear to auscultation    Cardio: __X_extremities free from edema ____pedal pulses palpable    GI/Abdomen: ___soft _____ Nontender___X___Nondistended_____HSM    Lymphatic: ___no palpable nodes in neck  ___no palpable nodes calves and feet    Skin/Wound: ___Incision, __X_Dressing c/d/i,   ____surrounding tissues soft,  ___drain/chest tube present____    Muscular: EHL _5__/5  Gastrocnemius___5/5    __X_absent clubbing/cyanosis         ASSESSMENT:  This is a 55y old Female with no PMH POD #4 s/o right total knee revision, doing well.     Recommended Treatment PLAN:  1. Continue Celebrex 200 mg PO BID  2. Continue Tylenol 975 mg PO TID  3. Continue Oxycodone 5-10 mg PO Q3h PRN moderate-severe pain. Monitor for signs and symptoms of opioid withdrawal as patient was on much higher dose of opioids at home. If s/s present, consider increasing to Oxycodone 10-20 mg PO Q4h PRN moderate-severe pain  4. Continue Dilaudid 0.5 mg IVP Q4h PRN breakthrough pain  5. Continue Robaxin 500 mg PO Q8h PRN muscle spasm  Plan discussed with Dr Smith    [facility] [phone]    HPI: Patient seen and examined today. Patient POD 4 s/p right knee revision with Dr. Fay. Patient reports tolerable level of pain today, rating the pain a 7 out of 10. Patient reports the pain increases significantly with movement. Patient pain regimen reviewed at bedside and patient questions were answered. Discussed possibility of increasing to Oxycodone 5-10 mg PO Q3h PRN. Patient denies side effects from current pain regimen.    Pertinent PMH: Pain at: ___Back ___Neck_X__Knee ___Hip ___Shoulder _X_ Opioid tolerance    Pain is ___X sharp ____dull ___burning ___achy ___ Intensity: ____ mild ___X_mod __X__severe     Location _X____surgical site _____cervical _____lumbar ____abd _____upper ext___X_lower ext    Worse with __X__activity ___X_movement _____physical therapy___ Rest    Improved with _X___medication ____rest ____physical therapy    PMH:  None    Medications:  oxyCODONE    IR  chlorhexidine 2% Cloths  sodium chloride 0.9% lock flush  psyllium Powder  vancomycin  IVPB  methocarbamol  aspirin enteric coated  cefepime   IVPB  HYDROmorphone  Injectable  cefepime   IVPB  vancomycin  IVPB  multivitamin  folic acid  senna  magnesium hydroxide Suspension  famotidine    Tablet  ondansetron Injectable  HYDROmorphone  Injectable  ketorolac   Injectable  celecoxib  acetaminophen   Tablet ..  lactated ringers.  lactated ringers.  ondansetron Injectable  aprepitant  gabapentin  celecoxib  acetaminophen   Tablet ..  chlorhexidine 2% Cloths  povidone iodine 5% Nasal Swab  lactated ringers.  oxyCODONE    IR  magnesium hydroxide Suspension  oxycodone    5 mG/acetaminophen 325 mG    ROS: Const:  _N__febrile   Eyes:_N__ENT:___CV: _N__chest pain  Resp: __N__sob  GI:__N_nausea _N__vomiting __N__abd pain ___npo ___clears Y___full diet __bm  :NMusk: _Y__pain ___spasm  Skin:__N_ Neuro:  __N_sedation_N__confusionN____ numbness __N_weakness ___paresthesia  Psych:_N__anxiety  Endo:___N Heme:___Allergy:_NKDA__      06-22 @ 07:53295 mL/min/1.73M2  Hemoglobin: 10.0 g/dL (06-22 @ 07:11)  Hemoglobin: 10.1 g/dL (06-21 @ 07:05)    T(C): 36.6 (06-22-21 @ 08:20), Max: 36.9 (06-21-21 @ 20:15)  HR: 93 (06-22-21 @ 08:20) (89 - 94)  BP: 100/60 (06-22-21 @ 08:20) (98/60 - 120/76)  RR: 18 (06-22-21 @ 08:20) (17 - 18)  SpO2: 94% (06-22-21 @ 08:20) (94% - 98%)  Wt(kg): --     PHYSICAL EXAM:  Gen Appearance: _X__no acute distress _X__appropriate       Neuro: ___SILT feet____ EOM Intact Psych: AAOX_3_, __X_mood/affect appropriate        Eyes: _X__conjunctiva WNL  ___X__ Pupils equal and round        ENT: _X__ears and nose atraumatic__X_ Hearing grossly intact        Neck: _X__trachea midline, no visible masses ___thyroid without palpable mass    Resp: __X_Nml WOB____No tactile fremitus ___clear to auscultation    Cardio: __X_extremities free from edema ____pedal pulses palpable    GI/Abdomen: ___soft _____ Nontender___X___Nondistended_____HSM    Lymphatic: ___no palpable nodes in neck  ___no palpable nodes calves and feet    Skin/Wound: ___Incision, __X_Dressing c/d/i,   ____surrounding tissues soft,  ___drain/chest tube present____    Muscular: EHL _5__/5  Gastrocnemius___5/5    __X_absent clubbing/cyanosis

## 2021-06-22 NOTE — PROGRESS NOTE ADULT - SUBJECTIVE AND OBJECTIVE BOX
Ortho Note    Procedure: R TKA explant, antibiotic spacer  Surgeon: Oh    Pain controlled with medication. Denies CP, SOB, N/V, numbness/tingling.    Vital Signs Last 24 Hrs  T(C): 36.8 (22 Jun 2021 05:05), Max: 36.9 (21 Jun 2021 20:15)  T(F): 98.2 (22 Jun 2021 05:05), Max: 98.4 (21 Jun 2021 20:15)  HR: 90 (22 Jun 2021 05:05) (87 - 94)  BP: 102/63 (22 Jun 2021 05:56) (98/60 - 123/91)  BP(mean): 73 (22 Jun 2021 05:05) (73 - 91)  RR: 17 (22 Jun 2021 05:56) (17 - 18)  SpO2: 98% (22 Jun 2021 05:56) (94% - 98%)    Physical Exam:  General: Resting comfortably in bed. AAOx3. NAD.  Extremities:   RLE: Knee immobilizer in place. Prevena intact w/ good function. SILT s/s/sp/dp/t. TA/EHL/FHL/GS motor intact. 2+ PT ,     A/P: 55yFemale  s/p R TKA explant, antibiotic spacer implant on 6/18.  - Stable  - Pain Control  - DVT ppx: ASA  - Post op abx: Vanc + cefepime; 6/21 vanco trough at 8pm 13.5, dose kept the same   - PT, WBS: 50% PWB x6 weeks, w/ KI for first 2 weeks   - Dispo: HPT pending PT clearance and PICC line placement    Ortho Pager [phone]

## 2021-06-23 LAB
ANION GAP SERPL CALC-SCNC: 10 MMOL/L — SIGNIFICANT CHANGE UP (ref 5–17)
BUN SERPL-MCNC: 13 MG/DL — SIGNIFICANT CHANGE UP (ref 7–23)
CALCIUM SERPL-MCNC: 8.9 MG/DL — SIGNIFICANT CHANGE UP (ref 8.4–10.5)
CHLORIDE SERPL-SCNC: 98 MMOL/L — SIGNIFICANT CHANGE UP (ref 96–108)
CO2 SERPL-SCNC: 29 MMOL/L — SIGNIFICANT CHANGE UP (ref 22–31)
CREAT SERPL-MCNC: 0.61 MG/DL — SIGNIFICANT CHANGE UP (ref 0.5–1.3)
GLUCOSE SERPL-MCNC: 98 MG/DL — SIGNIFICANT CHANGE UP (ref 70–99)
HCT VFR BLD CALC: 31.6 % — LOW (ref 34.5–45)
HGB BLD-MCNC: 9.7 G/DL — LOW (ref 11.5–15.5)
MCHC RBC-ENTMCNC: 27.7 PG — SIGNIFICANT CHANGE UP (ref 27–34)
MCHC RBC-ENTMCNC: 30.7 GM/DL — LOW (ref 32–36)
MCV RBC AUTO: 90.3 FL — SIGNIFICANT CHANGE UP (ref 80–100)
NRBC # BLD: 0 /100 WBCS — SIGNIFICANT CHANGE UP (ref 0–0)
PLATELET # BLD AUTO: 335 K/UL — SIGNIFICANT CHANGE UP (ref 150–400)
POTASSIUM SERPL-MCNC: 4.5 MMOL/L — SIGNIFICANT CHANGE UP (ref 3.5–5.3)
POTASSIUM SERPL-SCNC: 4.5 MMOL/L — SIGNIFICANT CHANGE UP (ref 3.5–5.3)
RBC # BLD: 3.5 M/UL — LOW (ref 3.8–5.2)
RBC # FLD: 13.4 % — SIGNIFICANT CHANGE UP (ref 10.3–14.5)
SODIUM SERPL-SCNC: 137 MMOL/L — SIGNIFICANT CHANGE UP (ref 135–145)
VANCOMYCIN FLD-MCNC: 14.4 UG/ML — SIGNIFICANT CHANGE UP
WBC # BLD: 8.09 K/UL — SIGNIFICANT CHANGE UP (ref 3.8–10.5)
WBC # FLD AUTO: 8.09 K/UL — SIGNIFICANT CHANGE UP (ref 3.8–10.5)

## 2021-06-23 PROCEDURE — 99232 SBSQ HOSP IP/OBS MODERATE 35: CPT | Mod: GC

## 2021-06-23 RX ADMIN — FAMOTIDINE 20 MILLIGRAM(S): 10 INJECTION INTRAVENOUS at 06:13

## 2021-06-23 RX ADMIN — HYDROMORPHONE HYDROCHLORIDE 0.5 MILLIGRAM(S): 2 INJECTION INTRAMUSCULAR; INTRAVENOUS; SUBCUTANEOUS at 17:25

## 2021-06-23 RX ADMIN — CELECOXIB 200 MILLIGRAM(S): 200 CAPSULE ORAL at 06:13

## 2021-06-23 RX ADMIN — Medication 300 MILLIGRAM(S): at 21:27

## 2021-06-23 RX ADMIN — OXYCODONE HYDROCHLORIDE 10 MILLIGRAM(S): 5 TABLET ORAL at 22:27

## 2021-06-23 RX ADMIN — Medication 975 MILLIGRAM(S): at 06:13

## 2021-06-23 RX ADMIN — HYDROMORPHONE HYDROCHLORIDE 0.5 MILLIGRAM(S): 2 INJECTION INTRAMUSCULAR; INTRAVENOUS; SUBCUTANEOUS at 01:34

## 2021-06-23 RX ADMIN — SENNA PLUS 2 TABLET(S): 8.6 TABLET ORAL at 21:27

## 2021-06-23 RX ADMIN — Medication 975 MILLIGRAM(S): at 21:45

## 2021-06-23 RX ADMIN — CELECOXIB 200 MILLIGRAM(S): 200 CAPSULE ORAL at 06:43

## 2021-06-23 RX ADMIN — CEFEPIME 100 MILLIGRAM(S): 1 INJECTION, POWDER, FOR SOLUTION INTRAMUSCULAR; INTRAVENOUS at 17:05

## 2021-06-23 RX ADMIN — FAMOTIDINE 20 MILLIGRAM(S): 10 INJECTION INTRAVENOUS at 17:04

## 2021-06-23 RX ADMIN — HYDROMORPHONE HYDROCHLORIDE 0.5 MILLIGRAM(S): 2 INJECTION INTRAMUSCULAR; INTRAVENOUS; SUBCUTANEOUS at 12:19

## 2021-06-23 RX ADMIN — Medication 1 TABLET(S): at 12:20

## 2021-06-23 RX ADMIN — OXYCODONE HYDROCHLORIDE 10 MILLIGRAM(S): 5 TABLET ORAL at 14:33

## 2021-06-23 RX ADMIN — Medication 975 MILLIGRAM(S): at 13:37

## 2021-06-23 RX ADMIN — Medication 975 MILLIGRAM(S): at 06:43

## 2021-06-23 RX ADMIN — Medication 81 MILLIGRAM(S): at 06:13

## 2021-06-23 RX ADMIN — CHLORHEXIDINE GLUCONATE 1 APPLICATION(S): 213 SOLUTION TOPICAL at 06:13

## 2021-06-23 RX ADMIN — HYDROMORPHONE HYDROCHLORIDE 0.5 MILLIGRAM(S): 2 INJECTION INTRAMUSCULAR; INTRAVENOUS; SUBCUTANEOUS at 12:45

## 2021-06-23 RX ADMIN — HYDROMORPHONE HYDROCHLORIDE 0.5 MILLIGRAM(S): 2 INJECTION INTRAMUSCULAR; INTRAVENOUS; SUBCUTANEOUS at 21:45

## 2021-06-23 RX ADMIN — SODIUM CHLORIDE 500 MILLILITER(S): 9 INJECTION, SOLUTION INTRAVENOUS at 00:02

## 2021-06-23 RX ADMIN — Medication 81 MILLIGRAM(S): at 17:05

## 2021-06-23 RX ADMIN — Medication 975 MILLIGRAM(S): at 13:17

## 2021-06-23 RX ADMIN — HYDROMORPHONE HYDROCHLORIDE 0.5 MILLIGRAM(S): 2 INJECTION INTRAMUSCULAR; INTRAVENOUS; SUBCUTANEOUS at 01:49

## 2021-06-23 RX ADMIN — CEFEPIME 100 MILLIGRAM(S): 1 INJECTION, POWDER, FOR SOLUTION INTRAMUSCULAR; INTRAVENOUS at 06:13

## 2021-06-23 RX ADMIN — HYDROMORPHONE HYDROCHLORIDE 0.5 MILLIGRAM(S): 2 INJECTION INTRAMUSCULAR; INTRAVENOUS; SUBCUTANEOUS at 17:53

## 2021-06-23 RX ADMIN — Medication 1 MILLIGRAM(S): at 12:20

## 2021-06-23 RX ADMIN — CELECOXIB 200 MILLIGRAM(S): 200 CAPSULE ORAL at 17:35

## 2021-06-23 RX ADMIN — Medication 300 MILLIGRAM(S): at 10:07

## 2021-06-23 RX ADMIN — OXYCODONE HYDROCHLORIDE 10 MILLIGRAM(S): 5 TABLET ORAL at 10:40

## 2021-06-23 RX ADMIN — OXYCODONE HYDROCHLORIDE 10 MILLIGRAM(S): 5 TABLET ORAL at 19:45

## 2021-06-23 RX ADMIN — HYDROMORPHONE HYDROCHLORIDE 0.5 MILLIGRAM(S): 2 INJECTION INTRAMUSCULAR; INTRAVENOUS; SUBCUTANEOUS at 21:26

## 2021-06-23 RX ADMIN — OXYCODONE HYDROCHLORIDE 10 MILLIGRAM(S): 5 TABLET ORAL at 15:00

## 2021-06-23 RX ADMIN — OXYCODONE HYDROCHLORIDE 10 MILLIGRAM(S): 5 TABLET ORAL at 10:07

## 2021-06-23 RX ADMIN — OXYCODONE HYDROCHLORIDE 10 MILLIGRAM(S): 5 TABLET ORAL at 19:14

## 2021-06-23 RX ADMIN — CELECOXIB 200 MILLIGRAM(S): 200 CAPSULE ORAL at 17:04

## 2021-06-23 RX ADMIN — Medication 975 MILLIGRAM(S): at 21:27

## 2021-06-23 NOTE — PROGRESS NOTE ADULT - SUBJECTIVE AND OBJECTIVE BOX
Pt feels well no events  reports no BM  otherwise pain controlled  ROS otherwise negative     VITAL SIGNS:  Vital Signs Last 24 Hrs  T(C): 36.7 (22 Jun 2021 13:06), Max: 36.9 (21 Jun 2021 20:15)  T(F): 98.1 (22 Jun 2021 13:06), Max: 98.4 (21 Jun 2021 20:15)  HR: 101 (22 Jun 2021 15:00) (90 - 101)  BP: 99/59 (22 Jun 2021 15:00) (94/58 - 120/76)  BP(mean): 73 (22 Jun 2021 05:05) (73 - 91)  RR: 18 (22 Jun 2021 15:00) (16 - 18)  SpO2: 95% (22 Jun 2021 15:00) (94% - 98%)    PHYSICAL EXAM:    General: WDWN  HEENT: NC/AT;   Neck: supple  Cardiovascular: +S1/S2; RRR  Respiratory: CTA b/l; no W/R/R  Gastrointestinal: soft, NT/ND; +BSx4  Extremities: WWP; R knee brace, L knee w/ scar      MEDICATIONS:  MEDICATIONS  (STANDING):  acetaminophen   Tablet .. 975 milliGRAM(s) Oral every 8 hours  aspirin enteric coated 81 milliGRAM(s) Oral two times a day  cefepime   IVPB 2000 milliGRAM(s) IV Intermittent every 12 hours  celecoxib 200 milliGRAM(s) Oral every 12 hours  chlorhexidine 2% Cloths 1 Application(s) Topical <User Schedule>  famotidine    Tablet 20 milliGRAM(s) Oral every 12 hours  folic acid 1 milliGRAM(s) Oral daily  lactated ringers. 1000 milliLiter(s) (100 mL/Hr) IV Continuous <Continuous>  multivitamin 1 Tablet(s) Oral daily  senna 2 Tablet(s) Oral at bedtime  vancomycin  IVPB 1500 milliGRAM(s) IV Intermittent every 12 hours    MEDICATIONS  (PRN):  HYDROmorphone  Injectable 0.5 milliGRAM(s) IV Push every 15 minutes PRN pacu  HYDROmorphone  Injectable 0.5 milliGRAM(s) IV Push every 4 hours PRN breakthrough  magnesium hydroxide Suspension 30 milliLiter(s) Oral daily PRN Constipation  methocarbamol 500 milliGRAM(s) Oral every 8 hours PRN Muscle Spasm  ondansetron Injectable 4 milliGRAM(s) IV Push every 6 hours PRN Nausea and/or Vomiting  oxyCODONE    IR 5 milliGRAM(s) Oral every 3 hours PRN Moderate Pain (4 - 6)  oxyCODONE    IR 10 milliGRAM(s) Oral every 3 hours PRN Severe Pain (7 - 10)  psyllium Powder 1 Packet(s) Oral two times a day PRN constipation  sodium chloride 0.9% lock flush 10 milliLiter(s) IV Push every 1 hour PRN Pre/post blood products, medications, blood draw, and to maintain line patency      ALLERGIES:  Allergies    No Known Allergies    Intolerances        LABS:                        10.0   8.01  )-----------( 317      ( 22 Jun 2021 07:11 )             32.0     06-22    132<L>  |  94<L>  |  10  ----------------------------<  116<H>  4.2   |  29  |  0.60    Ca    8.1<L>      22 Jun 2021 07:11          CAPILLARY BLOOD GLUCOSE          RADIOLOGY & ADDITIONAL TESTS: Reviewed.    ASSESSMENT:    PLAN:

## 2021-06-23 NOTE — PROGRESS NOTE ADULT - SUBJECTIVE AND OBJECTIVE BOX
Orthopaedic Surgery Progress Note    Post-operative day #5 s/p R knee explant and spacer     Subjective:     Patient seen and examined. Patient comfortable without complaints, pain controlled. Denies chest pain, shortness of breath, nausea/vomiting, numbness/tingling.    Objective:    Vital Signs Last 24 Hrs  T(C): 37.4 (06-23-21 @ 08:15), Max: 37.4 (06-23-21 @ 08:15)  T(F): 99.3 (06-23-21 @ 08:15), Max: 99.3 (06-23-21 @ 08:15)  HR: 98 (06-23-21 @ 08:15) (98 - 98)  BP: 101/64 (06-23-21 @ 08:15) (101/64 - 101/64)  RR: 18 (06-23-21 @ 08:15) (18 - 18)  SpO2: 96% (06-23-21 @ 08:15) (96% - 96%)  AVSS    PE:  General: Patient alert and oriented, NAD  Dressing: Prevena in place, holding suction, knee immobilizer in position   Pulses: 2+ DP BLE   Sensation: SILT BLE   Motor: EHL/FHL/TA/GS 5/5 BLE                           9.7    8.09  )-----------( 335      ( 23 Jun 2021 07:16 )             31.6     06-23    137  |  98  |  13  ----------------------------<  98  4.5   |  29  |  0.61    Ca    8.9      23 Jun 2021 07:16          I&O's Detail    22 Jun 2021 07:01  -  23 Jun 2021 07:00  --------------------------------------------------------  IN:    IV PiggyBack: 500 mL    IV PiggyBack: 50 mL    Lactated Ringers Bolus: 500 mL    Oral Fluid: 800 mL    sodium chloride 0.9%: 560 mL  Total IN: 2410 mL    OUT:    Voided (mL): 3450 mL  Total OUT: 3450 mL    Total NET: -1040 mL      23 Jun 2021 07:01  -  23 Jun 2021 11:01  --------------------------------------------------------  IN:    Oral Fluid: 300 mL  Total IN: 300 mL    OUT:  Total OUT: 0 mL    Total NET: 300 mL    A/P: 54yo Female POD#5 s/p R knee explant and spacer  1. Pain control as needed  2. DVT prophylaxis: ASA  3. PT, weight-bearing status: PWB 50% RLE x 6 weeks, in KI x 2 weeks  4. PICC placed yesterday, continue vanc and cefipime. Appreciate ID recs.   5. Dispo: Home with home PT, Friday    Ortho Pager [phone]

## 2021-06-23 NOTE — PROGRESS NOTE ADULT - SUBJECTIVE AND OBJECTIVE BOX
Pain Management Progress [facility] [phone]    HPI: Patient seen and examined today. Patient reports tolerable level of pain today. Patient remains comfortable with current pain regimen. Patient denies side effects from current pain regimen.    Pertinent PMH: Pain at: ___Back ___Neck__X_Knee ___Hip ___Shoulder ___ Opioid tolerance    Pain is __X_ sharp ____dull ___burning ___achy ___ Intensity: ____ mild __X__mod _X___severe     Location ___X__surgical site _____cervical _____lumbar ____abd _____upper ext__X__lower ext    Worse with __X__activity ___X_movement _____physical therapy___ Rest    Improved with __X__medication ____rest ____physical therapy    PMH:  None    Medications:  lactated ringers Bolus  sodium chloride 0.9%.  oxyCODONE    IR  chlorhexidine 2% Cloths  sodium chloride 0.9% lock flush  cefepime   IVPB  psyllium Powder  vancomycin  IVPB  methocarbamol  aspirin enteric coated  vancomycin  IVPB  senna  magnesium hydroxide Suspension  famotidine    Tablet  ondansetron Injectable  HYDROmorphone  Injectable  ketorolac   Injectable  celecoxib  acetaminophen   Tablet ..  lactated ringers.  BUpivacaine liposome 1.3% Injectable (no eMAR)  cefepime   IVPB  vancomycin  IVPB  lactated ringers.  ondansetron Injectable  aprepitant  gabapentin  celecoxib  acetaminophen   Tablet ..  chlorhexidine 2% Cloths  povidone iodine 5% Nasal Swab  lactated ringers.  oxyCODONE    IR  magnesium hydroxide Suspension  oxycodone    5 mG/acetaminophen 325 mG    ROS: Const:  _N__febrile   Eyes:__N_ENT:___CV: N___chest pain  Resp: __N__sob  GI:_N__nausea _N__vomiting ___N_abd pain ___npo ___clears ___full diet __bm  :__N_ Musk: _Y__pain _N__spasm  Skin:_N__ Neuro:  _N__sedation__N_confusion___N_ numbness __N_weakness __N_paresthesia  Psych:__Nanxiety  Endo:___ Heme:__N_Allergy:___NKDA      06-23 @ 07:89820 mL/min/1.73M2  Hemoglobin: 9.7 g/dL (06-23 @ 07:16)  Hemoglobin: 10.0 g/dL (06-22 @ 07:11)    T(C): 36.7 (06-23-21 @ 12:15), Max: 37.4 (06-23-21 @ 08:15)  HR: 99 (06-23-21 @ 12:15) (89 - 101)  BP: 107/61 (06-23-21 @ 12:15) (90/54 - 109/70)  RR: 18 (06-23-21 @ 12:15) (15 - 18)  SpO2: 95% (06-23-21 @ 12:15) (93% - 96%)  Wt(kg): --     PHYSICAL EXAM:  Gen Appearance: __X_no acute distress _X__appropriate       Neuro: ___SILT feet____ EOM Intact Psych: AAOX_3_, __X_mood/affect appropriate        Eyes: _X__conjunctiva WNL  __X___ Pupils equal and round        ENT: __X_ears and nose atraumatic__X_ Hearing grossly intact        Neck: X___trachea midline, no visible masses ___thyroid without palpable mass    Resp: _X__Nml WOB____No tactile fremitus ___clear to auscultation    Cardio: _X__extremities free from edema ____pedal pulses palpable    GI/Abdomen: ___soft _____ Nontender____X__Nondistended_____HSM    Lymphatic: ___no palpable nodes in neck  ___no palpable nodes calves and feet    Skin/Wound: _X__Incision, _X__Dressing c/d/i,   __X__surrounding tissues soft,  ___drain/chest tube present____    Muscular: EHL _5__/5  Gastrocnemius___5/5    _X__absent clubbing/cyanosis         ASSESSMENT:  This is a 55y old Female with no PMH POD #5 s/o right total knee revision, doing well.     Recommended Treatment PLAN:  1. Continue Celebrex 200 mg PO BID  2. Continue Tylenol 975 mg PO TID  3. Continue Oxycodone 5-10 mg PO Q3h PRN moderate-severe pain. Monitor for signs and symptoms of opioid withdrawal as patient was on much higher dose of opioids at home. If s/s present, consider increasing to Oxycodone 10-20 mg PO Q4h PRN moderate-severe pain  4. Continue Dilaudid 0.5 mg IVP Q4h PRN breakthrough pain  5. Continue Robaxin 500 mg PO Q8h PRN muscle spasm  Plan discussed with Dr. Smith

## 2021-06-24 ENCOUNTER — TRANSCRIPTION ENCOUNTER (OUTPATIENT)
Age: 56
End: 2021-06-24

## 2021-06-24 LAB — VANCOMYCIN TROUGH SERPL-MCNC: 12.6 UG/ML — SIGNIFICANT CHANGE UP (ref 10–20)

## 2021-06-24 PROCEDURE — 99232 SBSQ HOSP IP/OBS MODERATE 35: CPT

## 2021-06-24 RX ORDER — ACETAMINOPHEN 500 MG
2 TABLET ORAL
Qty: 0 | Refills: 0 | DISCHARGE
Start: 2021-06-24

## 2021-06-24 RX ORDER — VANCOMYCIN HCL 1 G
1750 VIAL (EA) INTRAVENOUS EVERY 12 HOURS
Refills: 0 | Status: DISCONTINUED | OUTPATIENT
Start: 2021-06-25 | End: 2021-06-25

## 2021-06-24 RX ORDER — OXYCODONE HYDROCHLORIDE 5 MG/1
1 TABLET ORAL
Qty: 42 | Refills: 0
Start: 2021-06-24 | End: 2021-06-30

## 2021-06-24 RX ORDER — GABAPENTIN 400 MG/1
100 CAPSULE ORAL THREE TIMES A DAY
Refills: 0 | Status: DISCONTINUED | OUTPATIENT
Start: 2021-06-24 | End: 2021-06-25

## 2021-06-24 RX ORDER — CELECOXIB 200 MG/1
1 CAPSULE ORAL
Qty: 14 | Refills: 0
Start: 2021-06-24 | End: 2021-06-30

## 2021-06-24 RX ORDER — VANCOMYCIN HCL 1 G
1750 VIAL (EA) INTRAVENOUS ONCE
Refills: 0 | Status: COMPLETED | OUTPATIENT
Start: 2021-06-24 | End: 2021-06-24

## 2021-06-24 RX ADMIN — Medication 250 MILLIGRAM(S): at 23:38

## 2021-06-24 RX ADMIN — SENNA PLUS 2 TABLET(S): 8.6 TABLET ORAL at 21:59

## 2021-06-24 RX ADMIN — Medication 1 MILLIGRAM(S): at 10:45

## 2021-06-24 RX ADMIN — HYDROMORPHONE HYDROCHLORIDE 0.5 MILLIGRAM(S): 2 INJECTION INTRAMUSCULAR; INTRAVENOUS; SUBCUTANEOUS at 17:44

## 2021-06-24 RX ADMIN — Medication 81 MILLIGRAM(S): at 17:43

## 2021-06-24 RX ADMIN — FAMOTIDINE 20 MILLIGRAM(S): 10 INJECTION INTRAVENOUS at 17:43

## 2021-06-24 RX ADMIN — CEFEPIME 100 MILLIGRAM(S): 1 INJECTION, POWDER, FOR SOLUTION INTRAMUSCULAR; INTRAVENOUS at 06:38

## 2021-06-24 RX ADMIN — OXYCODONE HYDROCHLORIDE 10 MILLIGRAM(S): 5 TABLET ORAL at 17:00

## 2021-06-24 RX ADMIN — Medication 975 MILLIGRAM(S): at 13:45

## 2021-06-24 RX ADMIN — FAMOTIDINE 20 MILLIGRAM(S): 10 INJECTION INTRAVENOUS at 05:25

## 2021-06-24 RX ADMIN — CHLORHEXIDINE GLUCONATE 1 APPLICATION(S): 213 SOLUTION TOPICAL at 06:39

## 2021-06-24 RX ADMIN — HYDROMORPHONE HYDROCHLORIDE 0.5 MILLIGRAM(S): 2 INJECTION INTRAMUSCULAR; INTRAVENOUS; SUBCUTANEOUS at 18:00

## 2021-06-24 RX ADMIN — CELECOXIB 200 MILLIGRAM(S): 200 CAPSULE ORAL at 06:38

## 2021-06-24 RX ADMIN — Medication 975 MILLIGRAM(S): at 22:25

## 2021-06-24 RX ADMIN — METHOCARBAMOL 500 MILLIGRAM(S): 500 TABLET, FILM COATED ORAL at 13:12

## 2021-06-24 RX ADMIN — Medication 1 TABLET(S): at 10:45

## 2021-06-24 RX ADMIN — OXYCODONE HYDROCHLORIDE 10 MILLIGRAM(S): 5 TABLET ORAL at 06:03

## 2021-06-24 RX ADMIN — HYDROMORPHONE HYDROCHLORIDE 0.5 MILLIGRAM(S): 2 INJECTION INTRAMUSCULAR; INTRAVENOUS; SUBCUTANEOUS at 09:30

## 2021-06-24 RX ADMIN — Medication 975 MILLIGRAM(S): at 13:12

## 2021-06-24 RX ADMIN — Medication 975 MILLIGRAM(S): at 06:03

## 2021-06-24 RX ADMIN — CELECOXIB 200 MILLIGRAM(S): 200 CAPSULE ORAL at 07:36

## 2021-06-24 RX ADMIN — CELECOXIB 200 MILLIGRAM(S): 200 CAPSULE ORAL at 17:43

## 2021-06-24 RX ADMIN — Medication 975 MILLIGRAM(S): at 21:59

## 2021-06-24 RX ADMIN — GABAPENTIN 100 MILLIGRAM(S): 400 CAPSULE ORAL at 21:59

## 2021-06-24 RX ADMIN — OXYCODONE HYDROCHLORIDE 10 MILLIGRAM(S): 5 TABLET ORAL at 11:20

## 2021-06-24 RX ADMIN — OXYCODONE HYDROCHLORIDE 10 MILLIGRAM(S): 5 TABLET ORAL at 16:29

## 2021-06-24 RX ADMIN — Medication 81 MILLIGRAM(S): at 06:38

## 2021-06-24 RX ADMIN — OXYCODONE HYDROCHLORIDE 10 MILLIGRAM(S): 5 TABLET ORAL at 10:45

## 2021-06-24 RX ADMIN — CELECOXIB 200 MILLIGRAM(S): 200 CAPSULE ORAL at 18:31

## 2021-06-24 RX ADMIN — OXYCODONE HYDROCHLORIDE 10 MILLIGRAM(S): 5 TABLET ORAL at 23:37

## 2021-06-24 RX ADMIN — HYDROMORPHONE HYDROCHLORIDE 0.5 MILLIGRAM(S): 2 INJECTION INTRAMUSCULAR; INTRAVENOUS; SUBCUTANEOUS at 10:00

## 2021-06-24 RX ADMIN — METHOCARBAMOL 500 MILLIGRAM(S): 500 TABLET, FILM COATED ORAL at 01:48

## 2021-06-24 RX ADMIN — CEFEPIME 100 MILLIGRAM(S): 1 INJECTION, POWDER, FOR SOLUTION INTRAMUSCULAR; INTRAVENOUS at 17:43

## 2021-06-24 RX ADMIN — Medication 300 MILLIGRAM(S): at 09:32

## 2021-06-24 RX ADMIN — OXYCODONE HYDROCHLORIDE 10 MILLIGRAM(S): 5 TABLET ORAL at 05:25

## 2021-06-24 RX ADMIN — Medication 975 MILLIGRAM(S): at 05:25

## 2021-06-24 NOTE — PROGRESS NOTE ADULT - TIME BILLING
Time spent discussing care with primary team. Greater than 30 minutes spent on total encounter; more than 50% of the visit was spent counseling and/or coordinating care by the attending physician.
Management of prosthetic joint infection
Time spent discussing care with primary team. Greater than 30 minutes spent on total encounter; more than 50% of the visit was spent counseling and/or coordinating care by the attending physician.
54yo female with hx of asthma, L knee septic arthritis 2012, L TKA 2015, R TKA 3/2019, presenting with worsening b/l knee pain and concern for R knee infection s/p R knee washout and antibiotic spacer implant.     1- R PJI: s/p spacer placement, cultures NGTD, on Vanc/Cefepime, f/u ID recs, PICC placed  2- Anemia: acute blood loss anemia from surgery, h/h stable  3-Thrombocytosis: likely reactive in setting of infection, resolved  4-Asthma hx: not in exacerbation, prn duonebs  5-Hyponatremia: resolved  #DVT px: ASA BID
Antibiotic management of prosthetic joint infection
Counseling provided with time spent for DVT prophylaxis, anemia, and medication education.  Time spent discussing care with primary team. Greater than 40 minutes spent on total encounter; more than 50% of the visit was spent counseling and/or coordinating care by the attending physician.

## 2021-06-24 NOTE — PROGRESS NOTE ADULT - ATTENDING COMMENTS
ASSESSMENT:  This is a 55y old Female with no PMH POD #4 s/o right total knee revision, doing well.     Recommended Treatment PLAN:  1. Continue Celebrex 200 mg PO BID  2. Continue Tylenol 975 mg PO TID  3. Continue Oxycodone 5-10 mg PO Q3h PRN moderate-severe pain. Monitor for signs and symptoms of opioid withdrawal as patient was on much higher dose of opioids at home. If s/s present, consider increasing to Oxycodone 10-20 mg PO Q4h PRN moderate-severe pain  4. Continue Dilaudid 0.5 mg IVP Q4h PRN breakthrough pain  5. Continue Robaxin 500 mg PO Q8h PRN muscle spasm  pt seen and examined by me on pm rounds  NP acted as scribe
ASSESSMENT:  This is a 55y old Female with  no PMH POD #6 s/o right total knee revision, doing well.     Recommended Treatment PLAN:  1. Continue Celebrex 200 mg PO BID  2. Continue Tylenol 975 mg PO TID  3. Continue Oxycodone 5-10 mg PO Q3h PRN moderate-severe pain. Monitor for signs and symptoms of opioid withdrawal as patient was on much higher dose of opioids at home. If s/s present, consider increasing to Oxycodone 10-20 mg PO Q4h PRN moderate-severe pain  4. Continue Dilaudid 0.5 mg IVP Q4h PRN breakthrough pain  5. Continue Robaxin 500 mg PO Q8h PRN muscle spasm  6. Consider starting Gabapentin 100 mg PO TID  pt seen and examined by me on pm rounds  NP acted as scribe

## 2021-06-24 NOTE — PROGRESS NOTE ADULT - SUBJECTIVE AND OBJECTIVE BOX
Pain Management Progress Note - [facility] [phone]    HPI: Patient seen and examined today. Patient reports tolerable level of pain today, rating the pain a 6 out of 10. Patient reports pain increases with movement. Patient with complaints of pain in the back of the thigh that she reports she has had for months. Patient states she used to take Gabapentin for the pain, but stopped taking it once the pain went away. Patient denies side effects from current pain regimen.    Pertinent PMH: Pain at: ___Back ___Neck_X__Knee ___Hip ___Shoulder ___X Opioid tolerance    Pain is X___ sharp ____dull ___burning ___achy ___ Intensity: ____ mild _X___mod ___X_severe     Location ____X_surgical site _____cervical _____lumbar ____abd _____upper ext___X_lower ext    Worse with __X__activity ___X_movement _____physical therapy___ Rest    Improved with ___X_medication ____rest ____physical therapy    PMH:  None    Medications:  lactated ringers Bolus  sodium chloride 0.9%.  oxyCODONE    IR  chlorhexidine 2% Cloths  sodium chloride 0.9% lock flush  cefepime   IVPB  psyllium Powder  vancomycin  IVPB  methocarbamol  aspirin enteric coated  cefepime   IVPB  vancomycin  IVPB  cefepime   IVPB  vancomycin  IVPB  HYDROmorphone  Injectable  cefepime   IVPB  vancomycin  IVPB  multivitamin  folic acid  senna  magnesium hydroxide Suspension  famotidine    Tablet  ondansetron Injectable  HYDROmorphone  Injectable  ketorolac   Injectable  celecoxib  acetaminophen   Tablet ..  lactated ringers.  BUpivacaine liposome 1.3% Injectable (no eMAR)  cefepime   IVPB  vancomycin  IVPB  lactated ringers.  ondansetron Injectable  aprepitant  gabapentin  celecoxib  acetaminophen   Tablet ..  chlorhexidine 2% Cloths  povidone iodine 5% Nasal Swab  lactated ringers.  oxyCODONE    IR  magnesium hydroxide Suspension  oxycodone    5 mG/acetaminophen 325 mG    ROS: Const:  _N__febrile   Eyes:__N_ENT:___CV: N___chest pain  Resp: __N__sob  GI:_N__nausea N___vomiting ___N_abd pain ___npo ___clears _Y__full diet Y__bm  :__N_ Musk: __Y_pain ___spasm  Skin:__N_ Neuro:  __N_sedation___Nconfusion__N__ numbness _N__weakness ___paresthesia  Psych:__N_anxiety  Endo:_N__ Heme:___Allergy:_NKDA__    Hemoglobin: 9.7 g/dL (06-23 @ 07:16)    T(C): 36.6 (06-24-21 @ 08:31), Max: 37 (06-23-21 @ 20:27)  HR: 85 (06-24-21 @ 08:31) (85 - 99)  BP: 110/69 (06-24-21 @ 08:31) (102/59 - 111/78)  RR: 18 (06-24-21 @ 08:31) (17 - 18)  SpO2: 95% (06-24-21 @ 08:31) (95% - 98%)  Wt(kg): --     PHYSICAL EXAM:  Gen Appearance: X___no acute distress _X__appropriate       Neuro: _X__SILT feet____ EOM Intact Psych: AAOX3__, __X_mood/affect appropriate        Eyes: _X__conjunctiva WNL  _X____ Pupils equal and round        ENT: __X_ears and nose atraumatic___X Hearing grossly intact        Neck: _X__trachea midline, no visible masses ___thyroid without palpable mass    Resp: X___Nml WOB____No tactile fremitus ___clear to auscultation    Cardio: __X_extremities free from edema ____pedal pulses palpable    GI/Abdomen: ___soft _____ Nontender____X__Nondistended_____HSM    Lymphatic: ___no palpable nodes in neck  ___no palpable nodes calves and feet    Skin/Wound: _X__Incision, __X_Dressing c/d/i,   __X__surrounding tissues soft,  ___drain/chest tube present____    Muscular: EHL _5__/5  Gastrocnemius___5/5    _X__absent clubbing/cyanosis         ASSESSMENT:  This is a 55y old Female with  no PMH POD #6 s/o right total knee revision, doing well.     Recommended Treatment PLAN:  1. Continue Celebrex 200 mg PO BID  2. Continue Tylenol 975 mg PO TID  3. Continue Oxycodone 5-10 mg PO Q3h PRN moderate-severe pain. Monitor for signs and symptoms of opioid withdrawal as patient was on much higher dose of opioids at home. If s/s present, consider increasing to Oxycodone 10-20 mg PO Q4h PRN moderate-severe pain  4. Continue Dilaudid 0.5 mg IVP Q4h PRN breakthrough pain  5. Continue Robaxin 500 mg PO Q8h PRN muscle spasm  6. Consider starting Gabapentin 100 mg PO TID  Plan discussed with Dr. Smith     Pain Management Progress Note - [facility] [phone]    HPI: Patient seen and examined today. Patient reports tolerable level of pain today, rating the pain a 6 out of 10. Patient reports pain increases with movement. Patient with complaints of pain in the back of the thigh that she reports she has had for months. Patient states she used to take Gabapentin for the pain, but stopped taking it once the pain went away. Patient denies side effects from current pain regimen.    Pertinent PMH: Pain at: ___Back ___Neck_X__Knee ___Hip ___Shoulder ___X Opioid tolerance    Pain is X___ sharp ____dull ___burning ___achy ___ Intensity: ____ mild _X___mod ___X_severe     Location ____X_surgical site _____cervical _____lumbar ____abd _____upper ext___X_lower ext    Worse with __X__activity ___X_movement _____physical therapy___ Rest    Improved with ___X_medication ____rest ____physical therapy    PMH:  None    Medications:  lactated ringers Bolus  sodium chloride 0.9%.  oxyCODONE    IR  chlorhexidine 2% Cloths  sodium chloride 0.9% lock flush  cefepime   IVPB  psyllium Powder  vancomycin  IVPB  methocarbamol  aspirin enteric coated  cefepime   IVPB  vancomycin  IVPB  cefepime   IVPB  vancomycin  IVPB  HYDROmorphone  Injectable  cefepime   IVPB  vancomycin  IVPB  multivitamin  folic acid  senna  magnesium hydroxide Suspension  famotidine    Tablet  ondansetron Injectable  HYDROmorphone  Injectable  ketorolac   Injectable  celecoxib  acetaminophen   Tablet ..  lactated ringers.  BUpivacaine liposome 1.3% Injectable (no eMAR)  cefepime   IVPB  vancomycin  IVPB  lactated ringers.  ondansetron Injectable  aprepitant  gabapentin  celecoxib  acetaminophen   Tablet ..  chlorhexidine 2% Cloths  povidone iodine 5% Nasal Swab  lactated ringers.  oxyCODONE    IR  magnesium hydroxide Suspension  oxycodone    5 mG/acetaminophen 325 mG    ROS: Const:  _N__febrile   Eyes:__N_ENT:___CV: N___chest pain  Resp: __N__sob  GI:_N__nausea N___vomiting ___N_abd pain ___npo ___clears _Y__full diet Y__bm  :__N_ Musk: __Y_pain ___spasm  Skin:__N_ Neuro:  __N_sedation___Nconfusion__N__ numbness _N__weakness ___paresthesia  Psych:__N_anxiety  Endo:_N__ Heme:___Allergy:_NKDA__    Hemoglobin: 9.7 g/dL (06-23 @ 07:16)    T(C): 36.6 (06-24-21 @ 08:31), Max: 37 (06-23-21 @ 20:27)  HR: 85 (06-24-21 @ 08:31) (85 - 99)  BP: 110/69 (06-24-21 @ 08:31) (102/59 - 111/78)  RR: 18 (06-24-21 @ 08:31) (17 - 18)  SpO2: 95% (06-24-21 @ 08:31) (95% - 98%)  Wt(kg): --     PHYSICAL EXAM:  Gen Appearance: X___no acute distress _X__appropriate       Neuro: _X__SILT feet____ EOM Intact Psych: AAOX3__, __X_mood/affect appropriate        Eyes: _X__conjunctiva WNL  _X____ Pupils equal and round        ENT: __X_ears and nose atraumatic___X Hearing grossly intact        Neck: _X__trachea midline, no visible masses ___thyroid without palpable mass    Resp: X___Nml WOB____No tactile fremitus ___clear to auscultation    Cardio: __X_extremities free from edema ____pedal pulses palpable    GI/Abdomen: ___soft _____ Nontender____X__Nondistended_____HSM    Lymphatic: ___no palpable nodes in neck  ___no palpable nodes calves and feet    Skin/Wound: _X__Incision, __X_Dressing c/d/i,   __X__surrounding tissues soft,  ___drain/chest tube present____    Muscular: EHL _5__/5  Gastrocnemius___5/5    _X__absent clubbing/cyanosis

## 2021-06-24 NOTE — DIETITIAN INITIAL EVALUATION ADULT. - OTHER INFO
Pt admitted for worsening Rt and Lt knee with hx of bilateral TKA. S/P ortho surgery on 6/18. Pt working with PT daily.   Spoke with pt in room this morning. States appetite is intact, eating variety of foods and enjoying foods served. Does not typically drink water, but trying to increase.  Discussed with pt to trial water flavoring packets to help increase water intake.   +BM 6/24  GI: abd-soft      Overall, pt nutritionally stable.

## 2021-06-24 NOTE — PROGRESS NOTE ADULT - SUBJECTIVE AND OBJECTIVE BOX
OVERNIGHT EVENTS:    SUBJECTIVE / INTERVAL HPI: Patient seen and examined at bedside. No complaints. Pain controlled.     VITAL SIGNS:  Vital Signs Last 24 Hrs  T(C): 36.6 (24 Jun 2021 08:31), Max: 37 (23 Jun 2021 20:27)  T(F): 97.9 (24 Jun 2021 08:31), Max: 98.6 (23 Jun 2021 20:27)  HR: 85 (24 Jun 2021 08:31) (85 - 99)  BP: 110/69 (24 Jun 2021 08:31) (102/59 - 111/78)  BP(mean): --  RR: 18 (24 Jun 2021 08:31) (17 - 18)  SpO2: 95% (24 Jun 2021 08:31) (95% - 98%)    PHYSICAL EXAM:    General: WDWN  HEENT: NC/AT;   Neck: supple  Cardiovascular: +S1/S2; RRR  Respiratory: CTA b/l; no W/R/R  Gastrointestinal: soft, NT/ND; +BSx4  Extremities: WWP; R knee brace, L knee w/ scar, R arm PICC    MEDICATIONS:  MEDICATIONS  (STANDING):  acetaminophen   Tablet .. 975 milliGRAM(s) Oral every 8 hours  aspirin enteric coated 81 milliGRAM(s) Oral two times a day  cefepime   IVPB 2000 milliGRAM(s) IV Intermittent every 12 hours  celecoxib 200 milliGRAM(s) Oral every 12 hours  chlorhexidine 2% Cloths 1 Application(s) Topical <User Schedule>  famotidine    Tablet 20 milliGRAM(s) Oral every 12 hours  folic acid 1 milliGRAM(s) Oral daily  multivitamin 1 Tablet(s) Oral daily  senna 2 Tablet(s) Oral at bedtime  sodium chloride 0.9%. 1000 milliLiter(s) (80 mL/Hr) IV Continuous <Continuous>  vancomycin  IVPB 1500 milliGRAM(s) IV Intermittent every 12 hours    MEDICATIONS  (PRN):  HYDROmorphone  Injectable 0.5 milliGRAM(s) IV Push every 4 hours PRN breakthrough  HYDROmorphone  Injectable 0.5 milliGRAM(s) IV Push every 15 minutes PRN pacu  magnesium hydroxide Suspension 30 milliLiter(s) Oral daily PRN Constipation  methocarbamol 500 milliGRAM(s) Oral every 8 hours PRN Muscle Spasm  ondansetron Injectable 4 milliGRAM(s) IV Push every 6 hours PRN Nausea and/or Vomiting  oxyCODONE    IR 5 milliGRAM(s) Oral every 3 hours PRN Moderate Pain (4 - 6)  oxyCODONE    IR 10 milliGRAM(s) Oral every 3 hours PRN Severe Pain (7 - 10)  psyllium Powder 1 Packet(s) Oral two times a day PRN constipation  sodium chloride 0.9% lock flush 10 milliLiter(s) IV Push every 1 hour PRN Pre/post blood products, medications, blood draw, and to maintain line patency      ALLERGIES:  Allergies    No Known Allergies    Intolerances        LABS:                        9.7    8.09  )-----------( 335      ( 23 Jun 2021 07:16 )             31.6     06-23    137  |  98  |  13  ----------------------------<  98  4.5   |  29  |  0.61    Ca    8.9      23 Jun 2021 07:16          CAPILLARY BLOOD GLUCOSE          RADIOLOGY & ADDITIONAL TESTS: Reviewed.    ASSESSMENT:    PLAN:

## 2021-06-24 NOTE — PROGRESS NOTE ADULT - SUBJECTIVE AND OBJECTIVE BOX
Orthopaedic Surgery Progress Note    Post-operative day #6 s/p R knee explant and spacer    Subjective:     Patient seen and examined. Patient comfortable without complaints, pain controlled. Wants to go home tomorrow. Denies chest pain, shortness of breath, nausea/vomiting, numbness/tingling.    Objective:    Vital Signs Last 24 Hrs  T(C): 36.6 (06-24-21 @ 08:31), Max: 36.6 (06-24-21 @ 08:31)  T(F): 97.9 (06-24-21 @ 08:31), Max: 97.9 (06-24-21 @ 08:31)  HR: 85 (06-24-21 @ 08:31) (85 - 94)  BP: 110/69 (06-24-21 @ 08:31) (110/69 - 111/78)  RR: 18 (06-24-21 @ 08:31) (17 - 18)  SpO2: 95% (06-24-21 @ 08:31) (95% - 98%)  AVSS    PE:  General: Patient alert and oriented, NAD  Dressing: Clean/dry/intact prevena intact and working, KI in place    Pulses:  2+ DP BLE   Sensation: SILT BLE   Motor: EHL/FHL/TA/GS 5/5 BLE                          9.7    8.09  )-----------( 335      ( 23 Jun 2021 07:16 )             31.6     06-23    137  |  98  |  13  ----------------------------<  98  4.5   |  29  |  0.61    Ca    8.9      23 Jun 2021 07:16    I&O's Detail    23 Jun 2021 07:01  -  24 Jun 2021 07:00  --------------------------------------------------------  IN:    IV PiggyBack: 500 mL    Oral Fluid: 767 mL  Total IN: 1267 mL    OUT:    Voided (mL): 2150 mL  Total OUT: 2150 mL    Total NET: -883 mL    A/P: 54yo Female POD#6 s/p R knee explant and spacer   1. Pain control as needed  2. DVT prophylaxis: ASA   3. PT, weight-bearing status: 50% PWB RLE x 6 weeks, KI x 2 weeks    4. Continue antibiotics. Appreciate ID recs. Vanc trough tonight at 8pm  5. Dispo: Home tomorrow    Ortho Pager [phone]

## 2021-06-24 NOTE — DIETITIAN INITIAL EVALUATION ADULT. - ORAL INTAKE PTA/DIET HISTORY
No meals documented per EMR. Spoke with pt in room this morning. States appetite is intact, enjoying meals served. Ate 100% of [place] toast and benavieds for breakfast this morning.

## 2021-06-24 NOTE — PROGRESS NOTE ADULT - ASSESSMENT
54yo female with hx of asthma, L knee septic arthritis 2012, L TKA 2015, R TKA 3/2019, presenting with worsening b/l knee pain and concern for R knee infection s/p R knee washout and antibiotic spacer implant.     #R PJI: s/p spacer placement, cultures NGTD, on Vanc/Cefepime, s/p PICC, needs total 6 week course  #Anemia: acute blood loss anemia from surgery, trend H/H, goal >7  #Thrombocytosis: likely reactive in setting of infection, resolved  #Asthma hx: not in exacerbation, prn duonebs  #Hyponatremia: resolved, likely SIADH  #DVT px: ASA BID
56yo female with hx of asthma, L knee septic arthritis 2012, L TKA 2015, R TKA 3/2019, presenting with worsening b/l knee pain and concern for R knee infection s/p R knee washout and antibiotic spacer implant.     Plan  #R knee infection  -appreciate ID recs on abx s/p OR cx; fungal cx pending  -f/u arthrocentesis cx, currently on cefepime/vanc; if continuing vanc, would obtain trough prior to 4th dose (6/20 6PM)    #Chronic b/l knee pain  -on high dose opioid regimen at home  -f/u pain management recs     #Asthma hx  -clear lung exam, patient does not take inhalers at this time     #Hep C ab positive  -f/u VL, likely from treated prior infection per patient    #DVT px: per primary team  #Diet: NPO     Patient was seen and examined by me at bedside    Greater than 35 minutes spent on total encounter; more than 50% of the visit was spent counseling and/or coordinating care by the attending physician.
54yo female with hx of asthma, L knee septic arthritis 2012, L TKA 2015, R TKA 3/2019, presenting with worsening b/l knee pain and concern for R knee infection s/p R knee washout and antibiotic spacer implant.     #R PJI: s/p spacer placement, cultures NGTD, on Vanc/Cefepime, f/u ID recs, for PICC today  #Anemia: acute blood loss anemia from surgery, trend H/H, goal >7  #Thrombocytosis: likely reactive in setting of infection, resolved  #Asthma hx: not in exacerbation, prn duonebs  #Hyponatremia: obtain urine lytes, pre-renal vs SIADH 2/2 pain, c/w pain control,   #DVT px: ASA BID
56yo female with hx of asthma, L knee septic arthritis 2012, L TKA 2015, R TKA 3/2019, presenting with worsening b/l knee pain and concern for R knee infection s/p R knee washout and antibiotic spacer implant.     Plan  #R knee infection  -appreciate ID recs on abx s/p OR cx; fungal cx pending  -f/u arthrocentesis cx, currently on cefepime/vanc; if continuing vanc, would obtain trough prior to 4th dose (6/20 6PM)    #Chronic b/l knee pain  -on high dose opioid regimen at home  -patient requiring multiple dilaudid prns for breakthrough pain in AM; consider increasing current pain regimen     #Asthma hx  -clear lung exam, patient does not take inhalers at this time     #Hep C ab positive  -f/u VL, likely from treated prior infection per patient    #DVT px: per primary team  #Diet: NPO 
54yo female with hx of asthma, L knee septic arthritis 2012, L TKA 2015, R TKA 3/2019, presenting with worsening b/l knee pain and concern for R knee infection s/p R knee washout and antibiotic spacer implant.     #R PJI: s/p spacer placement, cultures NGTD, on Vanc/Cefepime, f/u ID recs,   #Anemia: acute blood loss anemia from surgery, trend H/H, goal >7  #Thrombocytosis: likely reactive in setting of infection  #Asthma hx: not in exacerbation, prn duonebs  #Hep C ab positive: f/u VL, likely from treated prior infection per patient  #Hyponatremia: resolved  #DVT px: ASA BID
54 yo female with hx L knee septic arthritis 2012, R TKA 3/2019, presenting with chronic R knee pain/swelling and gait instability; c/f hardware loosening and prosthetic joint infection; s/p explant and spacer placement 6/18.  - f/u OR culture data 6/18--ngtd  - continue empiric vancomycin 1.5g IV q12h (and check trough before 4th dose) plus cefepime 2g IV q12h  - PICC  - anticipate 6 week course of above through 7/29/21  - weekly CBC, CMP, ESR, CRP, vancomycin trough to be faxed to [phone]    Will arrange post hospital f/u with me in 2-3 weeks    Please reconsult with ?    ID Team 2
54 yo female with hx L knee septic arthritis 2012, R TKA 3/2019, presenting with chronic R knee pain/swelling and gait instability; c/f hardware loosening and prosthetic joint infection; s/p explant and spacer placement 6/18.  - to f/u OR culture data 6/18--ngtd  - f/u bcx  - continue empiric vancomycin 1.25g IV q12h (and check trough before 4th dose) plus cefepime 2g IV q8h    ID Team 2

## 2021-06-24 NOTE — PROGRESS NOTE ADULT - SUBJECTIVE AND OBJECTIVE BOX
Orthopaedic Surgery Progress Note    Subjective:   Patient seen and examined. Patient comfortable without complaints, pain controlled. Reports doing well with stairs on PT. Reports having BM. Denies chest pain, shortness of breath, nausea/vomiting, numbness/tingling.    Objective:  Vital Signs Last 24 Hrs  T(C): 36.4 (24 Jun 2021 05:30), Max: 37.4 (23 Jun 2021 08:15)  T(F): 97.6 (24 Jun 2021 05:30), Max: 99.3 (23 Jun 2021 08:15)  HR: 94 (24 Jun 2021 05:30) (85 - 99)  BP: 111/78 (24 Jun 2021 05:30) (101/64 - 111/78)  BP(mean): --  RR: 17 (24 Jun 2021 05:30) (17 - 18)  SpO2: 98% (24 Jun 2021 05:30) (95% - 98%)    PE:  General: Patient alert and oriented, NAD  Dressing: Prevena in place, holding suction, knee immobilizer in position   Pulses: 2+ DP BLE   Sensation: SILT BLE   Motor: EHL/FHL/TA/GS 5/5 BLE       A/P: 56yo Female s/p R knee explant and spacer on 6/18.  -Pain control as needed  -DVT prophylaxis: ASA  -PT, weight-bearing status: PWB 50% RLE x 6 weeks, in KI x 2 weeks  -PICC placed - continue vanc and cefipime, 6 week course through 7/29  -Dispo: Home with home PT, Friday    Ortho Pager [phone]

## 2021-06-25 VITALS
DIASTOLIC BLOOD PRESSURE: 63 MMHG | HEART RATE: 90 BPM | RESPIRATION RATE: 17 BRPM | SYSTOLIC BLOOD PRESSURE: 105 MMHG | OXYGEN SATURATION: 99 % | TEMPERATURE: 98 F

## 2021-06-25 PROCEDURE — 96374 THER/PROPH/DIAG INJ IV PUSH: CPT

## 2021-06-25 PROCEDURE — 80048 BASIC METABOLIC PNL TOTAL CA: CPT

## 2021-06-25 PROCEDURE — 87521 HEPATITIS C PROBE&RVRS TRNSC: CPT

## 2021-06-25 PROCEDURE — 86901 BLOOD TYPING SEROLOGIC RH(D): CPT

## 2021-06-25 PROCEDURE — 87015 SPECIMEN INFECT AGNT CONCNTJ: CPT

## 2021-06-25 PROCEDURE — 87205 SMEAR GRAM STAIN: CPT

## 2021-06-25 PROCEDURE — 88304 TISSUE EXAM BY PATHOLOGIST: CPT

## 2021-06-25 PROCEDURE — 36415 COLL VENOUS BLD VENIPUNCTURE: CPT

## 2021-06-25 PROCEDURE — 87102 FUNGUS ISOLATION CULTURE: CPT

## 2021-06-25 PROCEDURE — 85610 PROTHROMBIN TIME: CPT

## 2021-06-25 PROCEDURE — 89060 EXAM SYNOVIAL FLUID CRYSTALS: CPT

## 2021-06-25 PROCEDURE — 85730 THROMBOPLASTIN TIME PARTIAL: CPT

## 2021-06-25 PROCEDURE — 86900 BLOOD TYPING SEROLOGIC ABO: CPT

## 2021-06-25 PROCEDURE — 73560 X-RAY EXAM OF KNEE 1 OR 2: CPT

## 2021-06-25 PROCEDURE — 80202 ASSAY OF VANCOMYCIN: CPT

## 2021-06-25 PROCEDURE — 71045 X-RAY EXAM CHEST 1 VIEW: CPT

## 2021-06-25 PROCEDURE — 85027 COMPLETE CBC AUTOMATED: CPT

## 2021-06-25 PROCEDURE — C1713: CPT

## 2021-06-25 PROCEDURE — 86140 C-REACTIVE PROTEIN: CPT

## 2021-06-25 PROCEDURE — 86880 COOMBS TEST DIRECT: CPT

## 2021-06-25 PROCEDURE — 88311 DECALCIFY TISSUE: CPT

## 2021-06-25 PROCEDURE — 87040 BLOOD CULTURE FOR BACTERIA: CPT

## 2021-06-25 PROCEDURE — 86870 RBC ANTIBODY IDENTIFICATION: CPT

## 2021-06-25 PROCEDURE — 86769 SARS-COV-2 COVID-19 ANTIBODY: CPT

## 2021-06-25 PROCEDURE — 99285 EMERGENCY DEPT VISIT HI MDM: CPT | Mod: 25

## 2021-06-25 PROCEDURE — 97116 GAIT TRAINING THERAPY: CPT

## 2021-06-25 PROCEDURE — 89051 BODY FLUID CELL COUNT: CPT

## 2021-06-25 PROCEDURE — 87116 MYCOBACTERIA CULTURE: CPT

## 2021-06-25 PROCEDURE — 97535 SELF CARE MNGMENT TRAINING: CPT

## 2021-06-25 PROCEDURE — 97110 THERAPEUTIC EXERCISES: CPT

## 2021-06-25 PROCEDURE — 85652 RBC SED RATE AUTOMATED: CPT

## 2021-06-25 PROCEDURE — 85025 COMPLETE CBC W/AUTO DIFF WBC: CPT

## 2021-06-25 PROCEDURE — 97530 THERAPEUTIC ACTIVITIES: CPT

## 2021-06-25 PROCEDURE — C1769: CPT

## 2021-06-25 PROCEDURE — 86803 HEPATITIS C AB TEST: CPT

## 2021-06-25 PROCEDURE — 36573 INSJ PICC RS&I 5 YR+: CPT

## 2021-06-25 PROCEDURE — 86905 BLOOD TYPING RBC ANTIGENS: CPT

## 2021-06-25 PROCEDURE — 87070 CULTURE OTHR SPECIMN AEROBIC: CPT

## 2021-06-25 PROCEDURE — C1889: CPT

## 2021-06-25 PROCEDURE — 87635 SARS-COV-2 COVID-19 AMP PRB: CPT

## 2021-06-25 PROCEDURE — 93005 ELECTROCARDIOGRAM TRACING: CPT

## 2021-06-25 PROCEDURE — 87206 SMEAR FLUORESCENT/ACID STAI: CPT

## 2021-06-25 PROCEDURE — 86850 RBC ANTIBODY SCREEN: CPT

## 2021-06-25 PROCEDURE — 88305 TISSUE EXAM BY PATHOLOGIST: CPT

## 2021-06-25 PROCEDURE — 86922 COMPATIBILITY TEST ANTIGLOB: CPT

## 2021-06-25 PROCEDURE — 87075 CULTR BACTERIA EXCEPT BLOOD: CPT

## 2021-06-25 PROCEDURE — 97161 PT EVAL LOW COMPLEX 20 MIN: CPT

## 2021-06-25 PROCEDURE — 80053 COMPREHEN METABOLIC PANEL: CPT

## 2021-06-25 RX ADMIN — CELECOXIB 200 MILLIGRAM(S): 200 CAPSULE ORAL at 07:01

## 2021-06-25 RX ADMIN — CELECOXIB 200 MILLIGRAM(S): 200 CAPSULE ORAL at 06:14

## 2021-06-25 RX ADMIN — Medication 1 TABLET(S): at 10:57

## 2021-06-25 RX ADMIN — CEFEPIME 100 MILLIGRAM(S): 1 INJECTION, POWDER, FOR SOLUTION INTRAMUSCULAR; INTRAVENOUS at 07:04

## 2021-06-25 RX ADMIN — Medication 1 MILLIGRAM(S): at 10:57

## 2021-06-25 RX ADMIN — GABAPENTIN 100 MILLIGRAM(S): 400 CAPSULE ORAL at 07:47

## 2021-06-25 RX ADMIN — FAMOTIDINE 20 MILLIGRAM(S): 10 INJECTION INTRAVENOUS at 06:14

## 2021-06-25 RX ADMIN — Medication 975 MILLIGRAM(S): at 06:14

## 2021-06-25 RX ADMIN — Medication 975 MILLIGRAM(S): at 07:01

## 2021-06-25 RX ADMIN — CHLORHEXIDINE GLUCONATE 1 APPLICATION(S): 213 SOLUTION TOPICAL at 07:04

## 2021-06-25 RX ADMIN — OXYCODONE HYDROCHLORIDE 10 MILLIGRAM(S): 5 TABLET ORAL at 00:02

## 2021-06-25 RX ADMIN — HYDROMORPHONE HYDROCHLORIDE 0.5 MILLIGRAM(S): 2 INJECTION INTRAMUSCULAR; INTRAVENOUS; SUBCUTANEOUS at 07:04

## 2021-06-25 RX ADMIN — METHOCARBAMOL 500 MILLIGRAM(S): 500 TABLET, FILM COATED ORAL at 01:18

## 2021-06-25 RX ADMIN — OXYCODONE HYDROCHLORIDE 10 MILLIGRAM(S): 5 TABLET ORAL at 10:37

## 2021-06-25 RX ADMIN — Medication 81 MILLIGRAM(S): at 07:04

## 2021-06-25 RX ADMIN — Medication 250 MILLIGRAM(S): at 10:58

## 2021-06-25 RX ADMIN — HYDROMORPHONE HYDROCHLORIDE 0.5 MILLIGRAM(S): 2 INJECTION INTRAMUSCULAR; INTRAVENOUS; SUBCUTANEOUS at 07:22

## 2021-06-25 NOTE — PROGRESS NOTE ADULT - PROVIDER SPECIALTY LIST ADULT
Hospitalist
Orthopedics
Hospitalist
Hospitalist
Orthopedics
Pain Medicine
Hospitalist
Orthopedics
Pain Medicine
Pain Medicine
Hospitalist
Infectious Disease
Infectious Disease

## 2021-06-25 NOTE — PROGRESS NOTE ADULT - SUBJECTIVE AND OBJECTIVE BOX
Pain Management Progress [facility] [phone]    HPI: Patient seen and examined today. Patient reports tolerable level of pain today, rating the pain a 5 out of 10. Patient reports pain increases with movement. Patient reports adequate pain control with current pain regimen. Patient denies side effects from current pain regimen.     Pertinent PMH: Pain at: ___Back ___Neck_X__Knee ___Hip ___Shoulder ___ Opioid tolerance    Pain is __X_ sharp ____dull ___burning ___achy ___ Intensity: ____ mild __X__mod ___X_severe     Location ____X_surgical site _____cervical _____lumbar ____abd _____upper ext__X__lower ext    Worse with _X___activity __X__movement _____physical therapy___ Rest    Improved with __X__medication ____rest ____physical therapy    PMH:  None    Medications:  vancomycin  IVPB  gabapentin  lactated ringers Bolus  sodium chloride 0.9%.  oxyCODONE    IR  chlorhexidine 2% Cloths  sodium chloride 0.9% lock flush  cefepime   IVPB  psyllium Powder  methocarbamol  aspirin enteric coated  cefepime   IVPB  vancomycin  IVPB  vancomycin  IVPB  HYDROmorphone  Injectable  cefepime   IVPB  vancomycin  IVPB  multivitamin  folic acid  senna  magnesium hydroxide Suspension  famotidine    Tablet  ondansetron Injectable  HYDROmorphone  Injectable  ketorolac   Injectable  celecoxib  acetaminophen   Tablet ..  lactated ringers.  BUpivacaine liposome 1.3% Injectable (no eMAR)  lactated ringers.  ondansetron Injectable  aprepitant  gabapentin  celecoxib  acetaminophen   Tablet ..  chlorhexidine 2% Cloths  povidone iodine 5% Nasal Swab  lactated ringers.  oxyCODONE    IR  magnesium hydroxide Suspension  oxycodone    5 mG/acetaminophen 325 mG    ROS: Const:  N___febrile   Eyes:__N_ENT:___CV: _N__chest pain  Resp: __N__sob  GI:__N_nausea _N__vomiting __N__abd pain ___npo ___clears __Y_full diet __bm  :__N_ [place]: _Y__pain _N__spasm  Skin:__N_ Neuro:  __N_sedation__N_confusion____ numbness _N__weakness __N_paresthesia  Psych:__N_anxiety  Endo:__N_ Heme:___Allergy:___NKDA    T(C): 36.7 (06-25-21 @ 08:56), Max: 36.9 (06-25-21 @ 00:22)  HR: 90 (06-25-21 @ 08:56) (87 - 98)  BP: 105/63 (06-25-21 @ 08:56) (98/60 - 113/78)  RR: 17 (06-25-21 @ 08:56) (17 - 17)  SpO2: 99% (06-25-21 @ 08:56) (95% - 99%)  Wt(kg): --     PHYSICAL EXAM:  Gen Appearance: _X__no acute distress _X__appropriate       Neuro: ___SILT feet____ EOM Intact Psych: AAOX3__, _X__mood/affect appropriate        Eyes: _X__conjunctiva WNL  ___X__ Pupils equal and round        ENT: __X_ears and nose atraumatic_X__ Hearing grossly intact        Neck: _X__trachea midline, no visible masses ___thyroid without palpable mass    Resp: __X_Nml WOB____No tactile fremitus ___clear to auscultation    Cardio: __X_extremities free from edema ____pedal pulses palpable    GI/Abdomen: ___soft _____ Nontender_____X_Nondistended_____HSM    Lymphatic: ___no palpable nodes in neck  ___no palpable nodes calves and feet    Skin/Wound: ___Incision, _X__Dressing c/d/i,   ____surrounding tissues soft,  ___drain/chest tube present____    Muscular: EHL _5__/5  Gastrocnemius__5_/5    X___absent clubbing/cyanosis         ASSESSMENT:  This is a 55y old Female with no PMH POD #6 s/o right total knee revision, doing well.     Recommended Treatment PLAN:  1. Continue Celebrex 200 mg PO BID  2. Continue Tylenol 975 mg PO TID  3. Continue Oxycodone 5-10 mg PO Q3h PRN moderate-severe pain. Monitor for signs and symptoms of opioid withdrawal as patient was on much higher dose of opioids at home. If s/s present, consider increasing to Oxycodone 10-20 mg PO Q4h PRN moderate-severe pain  4. Continue Dilaudid 0.5 mg IVP Q4h PRN breakthrough pain  5. Continue Robaxin 500 mg PO Q8h PRN muscle spasm  6. Continue starting Gabapentin 100 mg PO TID  Plan discussed with Dr. Smith

## 2021-06-25 NOTE — PROGRESS NOTE ADULT - NSICDXPILOT_GEN_ALL_CORE
[place]

## 2021-06-25 NOTE — PROGRESS NOTE ADULT - REASON FOR ADMISSION
R knee PJI, L knee hardware loosening

## 2021-06-25 NOTE — PROGRESS NOTE ADULT - SUBJECTIVE AND OBJECTIVE BOX
Orthopaedic Surgery Progress Note    Post-operative day #7 s/p R knee explant and spacer    Subjective:     Patient seen and examined. Walking in the hallway with PT using a walker and her KI. No complaints. Pain controlled. Denies chest pain, shortness of breath, nausea/vomiting, numbness/tingling.    Objective:    Vital Signs Last 24 Hrs  T(C): 36.7 (06-25-21 @ 08:56), Max: 36.7 (06-25-21 @ 08:56)  T(F): 98 (06-25-21 @ 08:56), Max: 98 (06-25-21 @ 08:56)  HR: 90 (06-25-21 @ 08:56) (90 - 90)  BP: 105/63 (06-25-21 @ 08:56) (105/63 - 105/63)  RR: 17 (06-25-21 @ 08:56) (17 - 17)  SpO2: 99% (06-25-21 @ 08:56) (99% - 99%)  AVSS    PE:  General: Patient alert and oriented, NAD, walking in hallway with PT   Dressing: Clean/dry/intact KI RLE     I&O's Detail    24 Jun 2021 07:01  -  25 Jun 2021 07:00  --------------------------------------------------------  IN:    Oral Fluid: 1100 mL  Total IN: 1100 mL    OUT:    Voided (mL): 1850 mL  Total OUT: 1850 mL    Total NET: -750 mL    A/P: 54yo Female POD#7 s/p R knee explant and spacer   1. Pain control as needed  2. DVT prophylaxis: ASA   3. PT, weight-bearing status: PWB 50% x 6 weeks, KI x 2 weeks    4. Dispo: Home today with IV antibiotics and home PT    Ortho Pager [phone]

## 2021-06-25 NOTE — PROGRESS NOTE ADULT - SUBJECTIVE AND OBJECTIVE BOX
Orthopaedic Surgery Progress Note    Subjective:   Patient seen and examined. Patient comfortable without complaints, pain controlled. Denies chest pain, shortness of breath, nausea/vomiting, numbness/tingling.    Objective:  Vital Signs Last 24 Hrs  T(C): 36.7 (25 Jun 2021 08:56), Max: 36.9 (25 Jun 2021 00:22)  T(F): 98 (25 Jun 2021 08:56), Max: 98.4 (25 Jun 2021 00:22)  HR: 90 (25 Jun 2021 08:56) (87 - 98)  BP: 105/63 (25 Jun 2021 08:56) (98/60 - 113/78)  BP(mean): --  RR: 17 (25 Jun 2021 08:56) (17 - 17)  SpO2: 99% (25 Jun 2021 08:56) (95% - 99%)    PE:  General: Patient alert and oriented, NAD  Dressing: Prevena in place, holding suction, knee immobilizer in position   Pulses: 2+ DP BLE   Sensation: SILT BLE   Motor: EHL/FHL/TA/GS 5/5 BLE       A/P: 54yo Female s/p R knee explant and spacer on 6/18.  -Pain control as needed  -DVT prophylaxis: ASA  -PT, weight-bearing status: PWB 50% RLE x 6 weeks, in KI x 2 weeks  -PICC placed - continue vanc and cefipime, 6 week course through 7/29  -Dispo: Home with home PT today    Ortho Pager [phone]

## 2021-06-27 ENCOUNTER — INPATIENT (INPATIENT)
Facility: HOSPITAL | Age: 56
LOS: 1 days | Discharge: HOME | DRG: 561 | End: 2021-06-29
Attending: ORTHOPAEDIC SURGERY | Admitting: ORTHOPAEDIC SURGERY
Payer: MEDICARE

## 2021-06-27 VITALS
HEART RATE: 108 BPM | HEIGHT: 65 IN | OXYGEN SATURATION: 96 % | WEIGHT: 179.9 LBS | SYSTOLIC BLOOD PRESSURE: 124 MMHG | DIASTOLIC BLOOD PRESSURE: 83 MMHG | RESPIRATION RATE: 17 BRPM | TEMPERATURE: 99 F

## 2021-06-27 LAB
ALBUMIN SERPL ELPH-MCNC: 3.6 G/DL — SIGNIFICANT CHANGE UP (ref 3.3–5)
ALP SERPL-CCNC: 169 U/L — HIGH (ref 40–120)
ALT FLD-CCNC: 22 U/L — SIGNIFICANT CHANGE UP (ref 10–45)
ANION GAP SERPL CALC-SCNC: 11 MMOL/L — SIGNIFICANT CHANGE UP (ref 5–17)
AST SERPL-CCNC: 31 U/L — SIGNIFICANT CHANGE UP (ref 10–40)
BASOPHILS # BLD AUTO: 0.05 K/UL — SIGNIFICANT CHANGE UP (ref 0–0.2)
BASOPHILS NFR BLD AUTO: 0.5 % — SIGNIFICANT CHANGE UP (ref 0–2)
BILIRUB SERPL-MCNC: 0.5 MG/DL — SIGNIFICANT CHANGE UP (ref 0.2–1.2)
BUN SERPL-MCNC: 10 MG/DL — SIGNIFICANT CHANGE UP (ref 7–23)
CALCIUM SERPL-MCNC: 8.9 MG/DL — SIGNIFICANT CHANGE UP (ref 8.4–10.5)
CHLORIDE SERPL-SCNC: 102 MMOL/L — SIGNIFICANT CHANGE UP (ref 96–108)
CO2 SERPL-SCNC: 27 MMOL/L — SIGNIFICANT CHANGE UP (ref 22–31)
CREAT SERPL-MCNC: 0.6 MG/DL — SIGNIFICANT CHANGE UP (ref 0.5–1.3)
EOSINOPHIL # BLD AUTO: 0.86 K/UL — HIGH (ref 0–0.5)
EOSINOPHIL NFR BLD AUTO: 8 % — HIGH (ref 0–6)
GLUCOSE SERPL-MCNC: 97 MG/DL — SIGNIFICANT CHANGE UP (ref 70–99)
HCT VFR BLD CALC: 31.6 % — LOW (ref 34.5–45)
HGB BLD-MCNC: 9.9 G/DL — LOW (ref 11.5–15.5)
IMM GRANULOCYTES NFR BLD AUTO: 0.5 % — SIGNIFICANT CHANGE UP (ref 0–1.5)
LYMPHOCYTES # BLD AUTO: 1.93 K/UL — SIGNIFICANT CHANGE UP (ref 1–3.3)
LYMPHOCYTES # BLD AUTO: 18 % — SIGNIFICANT CHANGE UP (ref 13–44)
MCHC RBC-ENTMCNC: 27.8 PG — SIGNIFICANT CHANGE UP (ref 27–34)
MCHC RBC-ENTMCNC: 31.3 GM/DL — LOW (ref 32–36)
MCV RBC AUTO: 88.8 FL — SIGNIFICANT CHANGE UP (ref 80–100)
MONOCYTES # BLD AUTO: 0.76 K/UL — SIGNIFICANT CHANGE UP (ref 0–0.9)
MONOCYTES NFR BLD AUTO: 7.1 % — SIGNIFICANT CHANGE UP (ref 2–14)
NEUTROPHILS # BLD AUTO: 7.06 K/UL — SIGNIFICANT CHANGE UP (ref 1.8–7.4)
NEUTROPHILS NFR BLD AUTO: 65.9 % — SIGNIFICANT CHANGE UP (ref 43–77)
NRBC # BLD: 0 /100 WBCS — SIGNIFICANT CHANGE UP (ref 0–0)
PLATELET # BLD AUTO: 395 K/UL — SIGNIFICANT CHANGE UP (ref 150–400)
POTASSIUM SERPL-MCNC: 3.8 MMOL/L — SIGNIFICANT CHANGE UP (ref 3.5–5.3)
POTASSIUM SERPL-SCNC: 3.8 MMOL/L — SIGNIFICANT CHANGE UP (ref 3.5–5.3)
PROT SERPL-MCNC: 7.7 G/DL — SIGNIFICANT CHANGE UP (ref 6–8.3)
RBC # BLD: 3.56 M/UL — LOW (ref 3.8–5.2)
RBC # FLD: 13.7 % — SIGNIFICANT CHANGE UP (ref 10.3–14.5)
SARS-COV-2 RNA SPEC QL NAA+PROBE: NEGATIVE — SIGNIFICANT CHANGE UP
SODIUM SERPL-SCNC: 140 MMOL/L — SIGNIFICANT CHANGE UP (ref 135–145)
WBC # BLD: 10.71 K/UL — HIGH (ref 3.8–10.5)
WBC # FLD AUTO: 10.71 K/UL — HIGH (ref 3.8–10.5)

## 2021-06-27 PROCEDURE — 99285 EMERGENCY DEPT VISIT HI MDM: CPT

## 2021-06-27 RX ORDER — VANCOMYCIN HCL 1 G
1500 VIAL (EA) INTRAVENOUS EVERY 12 HOURS
Refills: 0 | Status: DISCONTINUED | OUTPATIENT
Start: 2021-06-28 | End: 2021-06-28

## 2021-06-27 RX ORDER — CEFEPIME 1 G/1
2000 INJECTION, POWDER, FOR SOLUTION INTRAMUSCULAR; INTRAVENOUS EVERY 12 HOURS
Refills: 0 | Status: DISCONTINUED | OUTPATIENT
Start: 2021-06-28 | End: 2021-06-28

## 2021-06-27 RX ORDER — PANTOPRAZOLE SODIUM 20 MG/1
40 TABLET, DELAYED RELEASE ORAL
Refills: 0 | Status: DISCONTINUED | OUTPATIENT
Start: 2021-06-27 | End: 2021-06-29

## 2021-06-27 RX ORDER — ACETAMINOPHEN 500 MG
975 TABLET ORAL EVERY 8 HOURS
Refills: 0 | Status: DISCONTINUED | OUTPATIENT
Start: 2021-06-27 | End: 2021-06-29

## 2021-06-27 RX ORDER — VANCOMYCIN HCL 1 G
1000 VIAL (EA) INTRAVENOUS ONCE
Refills: 0 | Status: COMPLETED | OUTPATIENT
Start: 2021-06-27 | End: 2021-06-27

## 2021-06-27 RX ORDER — OXYCODONE HYDROCHLORIDE 5 MG/1
5 TABLET ORAL EVERY 4 HOURS
Refills: 0 | Status: DISCONTINUED | OUTPATIENT
Start: 2021-06-27 | End: 2021-06-29

## 2021-06-27 RX ORDER — SENNA PLUS 8.6 MG/1
2 TABLET ORAL AT BEDTIME
Refills: 0 | Status: DISCONTINUED | OUTPATIENT
Start: 2021-06-27 | End: 2021-06-29

## 2021-06-27 RX ORDER — SODIUM CHLORIDE 9 MG/ML
1000 INJECTION INTRAMUSCULAR; INTRAVENOUS; SUBCUTANEOUS ONCE
Refills: 0 | Status: COMPLETED | OUTPATIENT
Start: 2021-06-27 | End: 2021-06-27

## 2021-06-27 RX ORDER — ONDANSETRON 8 MG/1
4 TABLET, FILM COATED ORAL EVERY 6 HOURS
Refills: 0 | Status: DISCONTINUED | OUTPATIENT
Start: 2021-06-27 | End: 2021-06-29

## 2021-06-27 RX ORDER — OXYCODONE HYDROCHLORIDE 5 MG/1
10 TABLET ORAL EVERY 4 HOURS
Refills: 0 | Status: DISCONTINUED | OUTPATIENT
Start: 2021-06-27 | End: 2021-06-29

## 2021-06-27 RX ORDER — POLYETHYLENE GLYCOL 3350 17 G/17G
17 POWDER, FOR SOLUTION ORAL AT BEDTIME
Refills: 0 | Status: DISCONTINUED | OUTPATIENT
Start: 2021-06-27 | End: 2021-06-29

## 2021-06-27 RX ORDER — GABAPENTIN 400 MG/1
100 CAPSULE ORAL THREE TIMES A DAY
Refills: 0 | Status: DISCONTINUED | OUTPATIENT
Start: 2021-06-27 | End: 2021-06-29

## 2021-06-27 RX ORDER — PROCHLORPERAZINE MALEATE 5 MG
5 TABLET ORAL ONCE
Refills: 0 | Status: COMPLETED | OUTPATIENT
Start: 2021-06-27 | End: 2021-06-28

## 2021-06-27 RX ORDER — CELECOXIB 200 MG/1
200 CAPSULE ORAL
Refills: 0 | Status: DISCONTINUED | OUTPATIENT
Start: 2021-06-27 | End: 2021-06-29

## 2021-06-27 RX ORDER — CEFEPIME 1 G/1
2000 INJECTION, POWDER, FOR SOLUTION INTRAMUSCULAR; INTRAVENOUS ONCE
Refills: 0 | Status: DISCONTINUED | OUTPATIENT
Start: 2021-06-28 | End: 2021-06-28

## 2021-06-27 RX ORDER — MORPHINE SULFATE 50 MG/1
4 CAPSULE, EXTENDED RELEASE ORAL ONCE
Refills: 0 | Status: DISCONTINUED | OUTPATIENT
Start: 2021-06-27 | End: 2021-06-27

## 2021-06-27 RX ORDER — HYDROMORPHONE HYDROCHLORIDE 2 MG/ML
0.5 INJECTION INTRAMUSCULAR; INTRAVENOUS; SUBCUTANEOUS EVERY 4 HOURS
Refills: 0 | Status: DISCONTINUED | OUTPATIENT
Start: 2021-06-27 | End: 2021-06-29

## 2021-06-27 RX ORDER — ASPIRIN/CALCIUM CARB/MAGNESIUM 324 MG
81 TABLET ORAL DAILY
Refills: 0 | Status: DISCONTINUED | OUTPATIENT
Start: 2021-06-27 | End: 2021-06-29

## 2021-06-27 RX ADMIN — Medication 250 MILLIGRAM(S): at 20:50

## 2021-06-27 RX ADMIN — SODIUM CHLORIDE 1000 MILLILITER(S): 9 INJECTION INTRAMUSCULAR; INTRAVENOUS; SUBCUTANEOUS at 20:50

## 2021-06-27 RX ADMIN — SODIUM CHLORIDE 1000 MILLILITER(S): 9 INJECTION INTRAMUSCULAR; INTRAVENOUS; SUBCUTANEOUS at 21:50

## 2021-06-27 RX ADMIN — Medication 1000 MILLIGRAM(S): at 21:50

## 2021-06-27 RX ADMIN — MORPHINE SULFATE 4 MILLIGRAM(S): 50 CAPSULE, EXTENDED RELEASE ORAL at 20:50

## 2021-06-27 RX ADMIN — MORPHINE SULFATE 4 MILLIGRAM(S): 50 CAPSULE, EXTENDED RELEASE ORAL at 21:15

## 2021-06-27 NOTE — ED PROVIDER NOTE - MUSCULOSKELETAL, MLM
Spine appears normal, range of motion is not limited, no muscle or joint tenderness Dressing noted to right knee, wound vac device not working, TTP over right knee with minimal ROM

## 2021-06-27 NOTE — ED PROVIDER NOTE - CLINICAL SUMMARY MEDICAL DECISION MAKING FREE TEXT BOX
56 y/o F w/ PMHx R knee replacement 2018 or 2019 and 06/18/2021 here at [facility] had the knee washed and a spacer placed, d/c x2 days ago now presents c/o R knee pain stating that she has not taken anything for the pain besides Tylenol and also reports that her wound vac and her PICC line is broken. Will consult ortho. Will give meds for pain. Will reevaluate. 56 y/o F w/ PMHx asthma (no prior intubation), L knee replacement 2015 (per pt hardware is broken) and R knee replacement 2018, pt reporting that 03/2021 had fall which caused pain swelling and redness in her R knee and then 05/08/2021 went to another ED where they told her the hardware was broken and then 06/18/2021 here at [facility] had a knee washout and a spacer placed for infection and was d/cd 2 days ago, now presents c/o R knee pain stating that she has not taken anything for pain except for Tylenol and that her wound vac is broken and that her PICC line is not working and has not gotten her Vancomycin doses since yesterday. Reports was supposed to get vancomycin for 6 wks. Pt states that she has not taken anything at all for the pain since yesterday. Denies fever, chills or any other acute sx. Pt reports she tried to reach out to her ortho surgeon Dr. Kevon Fay but was not able to reach his office. Fully vaccinated for COVID 19 as of 04/2021. No hx of COVID 19. Pt lives alone. Denies fevers, chills.  ED course: Pt HD stable. Labs/ studies noted. PICC line flushed and working per RN. Pain meds and Vancomycin given in ED. Ortho consulted and in ED to  ept and pt admitted to Ortho as she is unable to take care of herself.

## 2021-06-27 NOTE — ED ADULT NURSE NOTE - OBJECTIVE STATEMENT
55y F, A&ox3, presents to ed for right knee pain. PT is s/p right knee spacer placement and washout. PT previously had septic join. PT dc on Friday with PICC line to [facility]. Visiting nurse came on friday and taught pt how to infuse iv antibiotics. PT last antibiotics was saturday around noon, since then pt has not infused antibiotics. Per pt "I was taken more tylenol and I wasn't sure if I could" PT also reports continued pain with minimal relief from medications prescribed on dispo. Noted right knee wound vac. Swelling to site with Knee immobilizer placed. 55y F, A&ox3, presents to ed for right knee pain. PT is s/p right knee spacer placement and washout. PT previously had septic join. PT dc on Friday with PICC line to [facility]. Visiting nurse came on friday and taught pt how to infuse iv antibiotics. PT last antibiotics was saturday around noon, since then pt has not infused antibiotics. Per pt "I was taken more tylenol and I wasn't sure if I could" PT also reports continued pain with minimal relief from medications prescribed on dispo. Noted right knee wound vac. Swelling to site with Knee immobilizer placed. Denies fevers nor chills.

## 2021-06-27 NOTE — H&P ADULT - NSHPPHYSICALEXAM_GEN_ALL_CORE
Gen: Awake and alert, NAD  R knee prevena dressing nonfunctional, adaptor changed, lateral dressing removed  KI in place  Sensation intact s/s/sp/dp/t and symmetric   EHl/FHL/TA/GS 5/5  2+ DP/PT pulse  Toes WWP

## 2021-06-27 NOTE — H&P ADULT - HISTORY OF PRESENT ILLNESS
55F s/p R knee explant and antibiotics spacer placement 6/18 and discharged home 6/25 presenting to the ED with worsening R knee pain. Reports that she never got her pains med and has been having trouble taking care of her self at home. R knee wound looks clean and dry, prevena not functional. OR cultures no growth. Denies numbness, tingling, fevers, chills and SOB.

## 2021-06-27 NOTE — H&P ADULT - ASSESSMENT
55F s/p R knee explant and abx spacer     - Admit for placement   - Continue cefepime and vanco  - Pain meds  - DVT ppx  - Dispo planning

## 2021-06-27 NOTE — ED PROVIDER NOTE - PSYCHIATRIC, MLM
Alert and oriented to person, place, time/situation. normal mood and affect. no apparent risk to self or others. Alert and oriented. normal mood and affect

## 2021-06-27 NOTE — ED PROVIDER NOTE - OBJECTIVE STATEMENT
56 y/o F w/ PMHx asthma (no prior intubation), L knee replacement 2015 (per pt hardware is broken) and R knee replacement 2018 or 2019 pt reporting that 03/2021 had fall which caused pain, swelling and redness in her R knee and then 05/08/2021 went to another ED where they told her the hardware was broken and then 06/18/2021 here at [facility] had the knee washed and a spacer placed and was d/c x2 days ago now presents c/o R knee pain stating that she has not taken anything for pain except for Tylenol and that her wound vac is broken and that her PICC line is broken- reports was supposed to get vancomycin for 6 wks. Pt states that she has not taken anything at all for the pain since yesterday. Denies fever, chills or any other acute sx. Pt reports she tried to reach out to her ortho surgeon Dr. Kevon Fay but was not able to reach his office. Fully vaccinated for COVID as of 04/2021. No hx of COVID. Pt lives alone. 56 y/o F w/ PMHx asthma (no prior intubation), L knee replacement 2015 (per pt hardware is broken) and R knee replacement 2018, pt reporting that 03/2021 had fall which caused pain swelling and redness in her R knee and then 05/08/2021 went to another ED where they told her the hardware was broken and then 06/18/2021 here at [facility] had a knee washout and a spacer placed for infection and was d/cd 2 days ago, now presents c/o R knee pain stating that she has not taken anything for pain except for Tylenol and that her wound vac is broken and that her PICC line is not working. Reports was supposed to get vancomycin for 6 wks. Pt states that she has not taken anything at all for the pain since yesterday. Denies fever, chills or any other acute sx. Pt reports she tried to reach out to her ortho surgeon Dr. Kevon Fay but was not able to reach his office. Fully vaccinated for COVID 19 as of 04/2021. No hx of COVID. Pt lives alone. Denies fevers, chillls. 54 y/o F w/ PMHx asthma (no prior intubation), L knee replacement 2015 (per pt hardware is broken) and R knee replacement 2018, pt reporting that 03/2021 had fall which caused pain swelling and redness in her R knee and then 05/08/2021 went to another ED where they told her the hardware was broken and then 06/18/2021 here at [facility] had a knee washout and a spacer placed for infection and was d/cd 2 days ago, now presents c/o R knee pain stating that she has not taken anything for pain except for Tylenol and that her wound vac is broken and that her PICC line is not working and has not gotten Vancomycin doses since yesterday. Reports was supposed to get vancomycin for 6 wks. Pt states that she has not taken anything at all for the pain since yesterday. Denies fever, chills or any other acute sx. Pt reports she tried to reach out to her ortho surgeon Dr. Kevon Fay but was not able to reach his office. Fully vaccinated for COVID 19 as of 04/2021. No hx of COVID. Pt lives alone. Denies fevers, chillls.

## 2021-06-27 NOTE — ED ADULT TRIAGE NOTE - CHIEF COMPLAINT QUOTE
"I had right knee surgery and was discharged from here on IV antibiotics but my knee pain is worse, I don't have pain medication, my suction machine is not working and my PICC line is not working"

## 2021-06-28 ENCOUNTER — TRANSCRIPTION ENCOUNTER (OUTPATIENT)
Age: 56
End: 2021-06-28

## 2021-06-28 LAB
ANION GAP SERPL CALC-SCNC: 8 MMOL/L — SIGNIFICANT CHANGE UP (ref 5–17)
BUN SERPL-MCNC: 10 MG/DL — SIGNIFICANT CHANGE UP (ref 7–23)
CALCIUM SERPL-MCNC: 8.9 MG/DL — SIGNIFICANT CHANGE UP (ref 8.4–10.5)
CHLORIDE SERPL-SCNC: 101 MMOL/L — SIGNIFICANT CHANGE UP (ref 96–108)
CO2 SERPL-SCNC: 29 MMOL/L — SIGNIFICANT CHANGE UP (ref 22–31)
COVID-19 SPIKE DOMAIN AB INTERP: POSITIVE
COVID-19 SPIKE DOMAIN ANTIBODY RESULT: >250 U/ML — HIGH
CREAT SERPL-MCNC: 0.66 MG/DL — SIGNIFICANT CHANGE UP (ref 0.5–1.3)
GLUCOSE SERPL-MCNC: 98 MG/DL — SIGNIFICANT CHANGE UP (ref 70–99)
HCT VFR BLD CALC: 31.1 % — LOW (ref 34.5–45)
HGB BLD-MCNC: 9.3 G/DL — LOW (ref 11.5–15.5)
MCHC RBC-ENTMCNC: 27.3 PG — SIGNIFICANT CHANGE UP (ref 27–34)
MCHC RBC-ENTMCNC: 29.9 GM/DL — LOW (ref 32–36)
MCV RBC AUTO: 91.2 FL — SIGNIFICANT CHANGE UP (ref 80–100)
NRBC # BLD: 0 /100 WBCS — SIGNIFICANT CHANGE UP (ref 0–0)
PLATELET # BLD AUTO: 384 K/UL — SIGNIFICANT CHANGE UP (ref 150–400)
POTASSIUM SERPL-MCNC: 3.9 MMOL/L — SIGNIFICANT CHANGE UP (ref 3.5–5.3)
POTASSIUM SERPL-SCNC: 3.9 MMOL/L — SIGNIFICANT CHANGE UP (ref 3.5–5.3)
RBC # BLD: 3.41 M/UL — LOW (ref 3.8–5.2)
RBC # FLD: 13.8 % — SIGNIFICANT CHANGE UP (ref 10.3–14.5)
SARS-COV-2 IGG+IGM SERPL QL IA: >250 U/ML — HIGH
SARS-COV-2 IGG+IGM SERPL QL IA: POSITIVE
SODIUM SERPL-SCNC: 138 MMOL/L — SIGNIFICANT CHANGE UP (ref 135–145)
WBC # BLD: 8.76 K/UL — SIGNIFICANT CHANGE UP (ref 3.8–10.5)
WBC # FLD AUTO: 8.76 K/UL — SIGNIFICANT CHANGE UP (ref 3.8–10.5)

## 2021-06-28 RX ORDER — HYDROMORPHONE HYDROCHLORIDE 2 MG/ML
1 INJECTION INTRAMUSCULAR; INTRAVENOUS; SUBCUTANEOUS
Qty: 40 | Refills: 0
Start: 2021-06-28

## 2021-06-28 RX ORDER — VANCOMYCIN HCL 1 G
1500 VIAL (EA) INTRAVENOUS EVERY 12 HOURS
Refills: 0 | Status: DISCONTINUED | OUTPATIENT
Start: 2021-06-28 | End: 2021-06-29

## 2021-06-28 RX ORDER — OXYCODONE HYDROCHLORIDE 5 MG/1
1 TABLET ORAL
Qty: 28 | Refills: 0
Start: 2021-06-28

## 2021-06-28 RX ORDER — OXYCODONE HYDROCHLORIDE 5 MG/1
1 TABLET ORAL
Qty: 70 | Refills: 0
Start: 2021-06-28

## 2021-06-28 RX ORDER — TIOTROPIUM BROMIDE 18 UG/1
1 CAPSULE ORAL; RESPIRATORY (INHALATION)
Qty: 0 | Refills: 0 | DISCHARGE
Start: 2021-06-28

## 2021-06-28 RX ORDER — OXYCODONE HYDROCHLORIDE 5 MG/1
1 TABLET ORAL
Qty: 0 | Refills: 0 | DISCHARGE

## 2021-06-28 RX ORDER — ASPIRIN/CALCIUM CARB/MAGNESIUM 324 MG
1 TABLET ORAL
Qty: 30 | Refills: 0
Start: 2021-06-28

## 2021-06-28 RX ORDER — IPRATROPIUM/ALBUTEROL SULFATE 18-103MCG
3 AEROSOL WITH ADAPTER (GRAM) INHALATION EVERY 6 HOURS
Refills: 0 | Status: DISCONTINUED | OUTPATIENT
Start: 2021-06-28 | End: 2021-06-29

## 2021-06-28 RX ORDER — GABAPENTIN 400 MG/1
1 CAPSULE ORAL
Qty: 42 | Refills: 0
Start: 2021-06-28 | End: 2021-07-11

## 2021-06-28 RX ORDER — TIOTROPIUM BROMIDE 18 UG/1
1 CAPSULE ORAL; RESPIRATORY (INHALATION) DAILY
Refills: 0 | Status: DISCONTINUED | OUTPATIENT
Start: 2021-06-28 | End: 2021-06-29

## 2021-06-28 RX ORDER — IPRATROPIUM/ALBUTEROL SULFATE 18-103MCG
3 AEROSOL WITH ADAPTER (GRAM) INHALATION
Qty: 0 | Refills: 0 | DISCHARGE
Start: 2021-06-28

## 2021-06-28 RX ORDER — ALBUTEROL 90 UG/1
1 AEROSOL, METERED ORAL
Qty: 0 | Refills: 0 | DISCHARGE
Start: 2021-06-28

## 2021-06-28 RX ORDER — CEFEPIME 1 G/1
2000 INJECTION, POWDER, FOR SOLUTION INTRAMUSCULAR; INTRAVENOUS EVERY 12 HOURS
Refills: 0 | Status: DISCONTINUED | OUTPATIENT
Start: 2021-06-28 | End: 2021-06-29

## 2021-06-28 RX ORDER — ACETAMINOPHEN 500 MG
2 TABLET ORAL
Qty: 90 | Refills: 0
Start: 2021-06-28

## 2021-06-28 RX ORDER — MORPHINE SULFATE 50 MG/1
1 CAPSULE, EXTENDED RELEASE ORAL
Qty: 28 | Refills: 0
Start: 2021-06-28

## 2021-06-28 RX ORDER — ALBUTEROL 90 UG/1
1 AEROSOL, METERED ORAL EVERY 4 HOURS
Refills: 0 | Status: DISCONTINUED | OUTPATIENT
Start: 2021-06-28 | End: 2021-06-29

## 2021-06-28 RX ADMIN — Medication 81 MILLIGRAM(S): at 13:47

## 2021-06-28 RX ADMIN — HYDROMORPHONE HYDROCHLORIDE 0.5 MILLIGRAM(S): 2 INJECTION INTRAMUSCULAR; INTRAVENOUS; SUBCUTANEOUS at 18:00

## 2021-06-28 RX ADMIN — OXYCODONE HYDROCHLORIDE 10 MILLIGRAM(S): 5 TABLET ORAL at 00:08

## 2021-06-28 RX ADMIN — PANTOPRAZOLE SODIUM 40 MILLIGRAM(S): 20 TABLET, DELAYED RELEASE ORAL at 06:21

## 2021-06-28 RX ADMIN — HYDROMORPHONE HYDROCHLORIDE 0.5 MILLIGRAM(S): 2 INJECTION INTRAMUSCULAR; INTRAVENOUS; SUBCUTANEOUS at 17:48

## 2021-06-28 RX ADMIN — Medication 3 MILLILITER(S): at 05:11

## 2021-06-28 RX ADMIN — OXYCODONE HYDROCHLORIDE 5 MILLIGRAM(S): 5 TABLET ORAL at 21:43

## 2021-06-28 RX ADMIN — CEFEPIME 100 MILLIGRAM(S): 1 INJECTION, POWDER, FOR SOLUTION INTRAMUSCULAR; INTRAVENOUS at 23:55

## 2021-06-28 RX ADMIN — GABAPENTIN 100 MILLIGRAM(S): 400 CAPSULE ORAL at 22:50

## 2021-06-28 RX ADMIN — OXYCODONE HYDROCHLORIDE 10 MILLIGRAM(S): 5 TABLET ORAL at 00:59

## 2021-06-28 RX ADMIN — Medication 5 MILLIGRAM(S): at 12:23

## 2021-06-28 RX ADMIN — GABAPENTIN 100 MILLIGRAM(S): 400 CAPSULE ORAL at 13:47

## 2021-06-28 RX ADMIN — Medication 300 MILLIGRAM(S): at 21:23

## 2021-06-28 RX ADMIN — ONDANSETRON 4 MILLIGRAM(S): 8 TABLET, FILM COATED ORAL at 06:20

## 2021-06-28 RX ADMIN — OXYCODONE HYDROCHLORIDE 5 MILLIGRAM(S): 5 TABLET ORAL at 20:43

## 2021-06-28 RX ADMIN — OXYCODONE HYDROCHLORIDE 10 MILLIGRAM(S): 5 TABLET ORAL at 04:52

## 2021-06-28 RX ADMIN — SENNA PLUS 2 TABLET(S): 8.6 TABLET ORAL at 22:50

## 2021-06-28 RX ADMIN — ONDANSETRON 4 MILLIGRAM(S): 8 TABLET, FILM COATED ORAL at 20:44

## 2021-06-28 RX ADMIN — OXYCODONE HYDROCHLORIDE 10 MILLIGRAM(S): 5 TABLET ORAL at 05:48

## 2021-06-28 RX ADMIN — CEFEPIME 100 MILLIGRAM(S): 1 INJECTION, POWDER, FOR SOLUTION INTRAMUSCULAR; INTRAVENOUS at 10:58

## 2021-06-28 RX ADMIN — CELECOXIB 200 MILLIGRAM(S): 200 CAPSULE ORAL at 05:11

## 2021-06-28 RX ADMIN — Medication 300 MILLIGRAM(S): at 09:06

## 2021-06-28 RX ADMIN — POLYETHYLENE GLYCOL 3350 17 GRAM(S): 17 POWDER, FOR SOLUTION ORAL at 22:49

## 2021-06-28 RX ADMIN — Medication 3 MILLILITER(S): at 17:48

## 2021-06-28 RX ADMIN — CELECOXIB 200 MILLIGRAM(S): 200 CAPSULE ORAL at 06:10

## 2021-06-28 RX ADMIN — Medication 1 TABLET(S): at 13:48

## 2021-06-28 RX ADMIN — GABAPENTIN 100 MILLIGRAM(S): 400 CAPSULE ORAL at 05:12

## 2021-06-28 RX ADMIN — CELECOXIB 200 MILLIGRAM(S): 200 CAPSULE ORAL at 17:48

## 2021-06-28 NOTE — PROGRESS NOTE ADULT - SUBJECTIVE AND OBJECTIVE BOX
Ortho Note    Pt comfortable without complaints, pain controlled  Denies CP, SOB, N/V, numbness/tingling     Vital Signs Last 24 Hrs  T(C): 36.6 (06-28-21 @ 05:15), Max: 36.6 (06-28-21 @ 05:15)  T(F): 97.8 (06-28-21 @ 05:15), Max: 97.8 (06-28-21 @ 05:15)  HR: 91 (06-28-21 @ 05:15) (91 - 91)  BP: 101/62 (06-28-21 @ 05:15) (101/62 - 101/62)  BP(mean): --  RR: 18 (06-28-21 @ 05:15) (18 - 18)  SpO2: 97% (06-28-21 @ 05:15) (97% - 97%)  AVSS    General: Pt Alert and oriented, NAD  R knee prevena intact and functional   KI in place  Sensation intact s/s/sp/dp/t and symmetric   Motor: EHL/FHL/TA/GS 5/5 and symemtric   2+ DP pulse  Toes WWP    A/P: 55yFemale s/p R knee explant and abx spacer 6/18 readmitted for pain control and dispo   - Stable  - Pain Control  - DVT ppx: ASA  - PT, WBS: WBAT in KI  - Continue cefepime and vanc   - SW regarding dispo today  - Dispo pending     Ortho Pager [phone]

## 2021-06-28 NOTE — DISCHARGE NOTE PROVIDER - CARE PROVIDER_API CALL
Kevon Fay)  Orthopedics  [address] [place] [address]  Phone: [phone]  Fax: [phone]  Follow Up Time: 2 weeks    Aristeo Restrepo)  PhysicalRehab Medicine  [address], [address] [place] [address]  Phone: [phone]  Fax: [phone]  Follow Up Time: 2 weeks   Kevon aFy)  Orthopedics  [address] [place] [address]  Phone: [phone]  Fax: [phone]  Follow Up Time: 2 weeks    Aristeo Restrepo)  PhysicalRehab Medicine  [address], [address]  [place] [address]  Phone: [phone]  Fax: [phone]  Follow Up Time: 2 weeks    Kulwinder Whitman)  Internal Medicine  [address] [place] [address]  Phone: [phone]  Fax: [phone]  Follow Up Time:

## 2021-06-28 NOTE — DISCHARGE NOTE PROVIDER - HOSPITAL COURSE
Admitted 6/27  Pain control following previous admission 6/18-6/25 for R knee antibiotics, spacer removal   Christiane-op Antibiotics  Pain control  DVT prophylaxis  OOB/Physical Therapy

## 2021-06-28 NOTE — DISCHARGE NOTE PROVIDER - NSDCHHNEEDSERVICE_GEN_ALL_CORE
Central venous access care/Medication teaching and assessment/Observation and assessment/Rehabilitation services/Teaching and training

## 2021-06-28 NOTE — DISCHARGE NOTE PROVIDER - NSDCMRMEDTOKEN_GEN_ALL_CORE_FT
acetaminophen 325 mg oral tablet: 2 tab(s) orally every 8 hours as needed for mild pain  cbc, cmp, esr, crp, vanco trough weekly labs: fax to Dr. Ansari [phone]  cefepime 2 g intravenous injection: 2 gram(s) intravenously every 12 hours   start date 6-18 for 6 weeks, stop date 7- MDD:2    CeleBREX 200 mg oral capsule: 1 cap(s) orally 2 times a day   heparin flush 3ml, administer after each infusion:   normal saline 10ml admister after each infusion:   oxyCODONE 30 mg oral tablet: 1 tab(s) orally every 6 hours, As Needed for severe pain  vancomycin 1.5 g intravenous injection: 1.5 gram(s) intravenous every 12 hours MDD:2  start date 6-, for 6 weeks end date 7-   acetaminophen 500 mg oral tablet: 2 tab(s) orally every 8 hours MDD:6  albuterol 90 mcg/inh inhalation aerosol: 1 puff(s) inhaled every 4 hours  aspirin 81 mg oral tablet, chewable: 1 tab(s) orally once a day  cbc, cmp, esr, crp, vanco trough weekly labs: fax to Dr. Ansari [phone]  cefepime 2 g intravenous injection: 2 gram(s) intravenously every 12 hours   start date 6-18 for 6 weeks, stop date 7- MDD:2    CeleBREX 200 mg oral capsule: 1 cap(s) orally 2 times a day   gabapentin 100 mg oral capsule: 1 cap(s) orally 3 times a day MDD:3  heparin flush 3ml, administer after each infusion:   ipratropium-albuterol 0.5 mg-2.5 mg/3 mL inhalation solution: 3 milliliter(s) inhaled every 6 hours  MS Contin 30 mg oral tablet, extended release: 1 tab(s) orally every 12 hours MDD:2 tabs  normal saline 10ml admister after each infusion:   oxyCODONE 10 mg oral tablet: 1 tab(s) orally every 4 to 6 hours, As Needed -moderate pain MDD:5  tiotropium 18 mcg inhalation capsule: 1 cap(s) inhaled once a day  vancomycin 1.5 g intravenous injection: 1.5 gram(s) intravenous every 12 hours MDD:2  start date 6-, for 6 weeks end date 7-   acetaminophen 500 mg oral tablet: 2 tab(s) orally every 8 hours MDD:6  albuterol 90 mcg/inh inhalation aerosol: 1 puff(s) inhaled every 4 hours  aspirin 81 mg oral tablet, chewable: 1 tab(s) orally once a day  cbc, cmp, esr, crp, vanco trough weekly labs: fax to Dr. Ansari [phone]  cefepime 2 g intravenous injection: 2 gram(s) intravenously every 12 hours   start date 6-18 for 6 weeks, stop date 7- MDD:2    CeleBREX 200 mg oral capsule: 1 cap(s) orally 2 times a day   gabapentin 100 mg oral capsule: 1 cap(s) orally 3 times a day MDD:3  heparin flush 3ml, administer after each infusion:   ipratropium-albuterol 0.5 mg-2.5 mg/3 mL inhalation solution: 3 milliliter(s) inhaled every 6 hours  normal saline 10ml admister after each infusion:   oxyCODONE 10 mg oral tablet: 1 tab(s) orally every 4 to 6 hours, As Needed -moderate pain MDD:5  tiotropium 18 mcg inhalation capsule: 1 cap(s) inhaled once a day  vancomycin 1.5 g intravenous injection: 1.5 gram(s) intravenous every 12 hours MDD:2  start date 6-, for 6 weeks end date 7-  Zofran 4 mg oral tablet: 1 tab(s) orally every 8 hours, As Needed -for nausea MDD:3

## 2021-06-28 NOTE — DISCHARGE NOTE PROVIDER - NSDCFUADDINST_GEN_ALL_CORE_FT
50%  weight bearing right lower extremity in Knee Immobilizer.   No strenuous activity, heavy lifting, driving or returning to work until cleared by MD.  Prevena:  You may take showers. Keep the battery pack dry. You may disconnect the dressing from the battery pack prior to showers and keep away from water. To do this, hold the on/off button down until it turns off, close the clamp on the tubing, then disconnect the tubing from the battery pack. Do the reverse to turn it back on.  No soaking in bathtubs.  The dressing has a battery that usually dies in 7 days. Once this occurs, you may remove the dressing and dispose of it, then leave incision open to air. Keep incision clean and dry.     Try to have regular bowel movements, take stool softener or laxative if necessary.  Swelling may travel all the way down extremity, this is normal and will subside in a few weeks.  Call to schedule an appt with Dr. Fay  for follow up, if you have staples or sutures they will be removed in office.  Follow up with Dr. Whitman from Infectious Disease. Continue weekly labs.   Contact your doctor if you experience: fever greater than 101.5, chills, chest pain, difficulty breathing, redness or excessive drainage around the incision, other concerns.

## 2021-06-28 NOTE — DISCHARGE NOTE PROVIDER - PROVIDER TOKENS
PROVIDER:[TOKEN:[[id number]],FOLLOWUP:[2 weeks]],PROVIDER:[TOKEN:[[id number]],FOLLOWUP:[2 weeks]] PROVIDER:[TOKEN:[[id number]],FOLLOWUP:[2 weeks]],PROVIDER:[TOKEN:[[id number]],FOLLOWUP:[2 weeks]],PROVIDER:[TOKEN:[[id number]]]

## 2021-06-28 NOTE — PROGRESS NOTE ADULT - ATTENDING COMMENTS
We had a discussion about her disposition. I told her I am not comfortable dispensing the amount of opioids that she was receiving for the entire last year from Dr. Feliz (~120mg oxycodone/day). She verbalized that the multimodal pain regimen that she was receiving in the hospital has been sufficient and would be ok continuing with it at home. She verbalized willingness and competency to self-administer her antibiotics at home, having done so before. She insists that she has business at home that she must personally attend to next week and is absolutely unwilling to go to a subacute rehab center.     Will reconfirm availability of infusion services for her and plan for d/c home under the above parameters. She understands that she may experience some opioid withdrawal symptoms, and that she will need to continue to work toward total opioid cessation over time. I re-emphasized the importance of complete tobacco cessation with her.

## 2021-06-28 NOTE — DISCHARGE NOTE PROVIDER - CARE PROVIDERS DIRECT ADDRESSES
,DirectAddress_Unknown,DirectAddress_Unknown ,DirectAddress_Unknown,DirectAddress_Unknown,[email or web address]

## 2021-06-28 NOTE — PROGRESS NOTE ADULT - SUBJECTIVE AND OBJECTIVE BOX
Ortho Note    Patient readmitted for pain control/IV abx   Pt comfortable without complaints, pain controlled  Denies CP, SOB, N/V, numbness/tingling     Vital Signs Last 24 Hrs  T(C): 36.1 (06-28-21 @ 09:06), Max: 36.6 (06-28-21 @ 05:15)  T(F): 97 (06-28-21 @ 09:06), Max: 97.8 (06-28-21 @ 05:15)  HR: 87 (06-28-21 @ 09:06) (87 - 91)  BP: 104/53 (06-28-21 @ 09:06) (101/62 - 104/53)  BP(mean): --  RR: 16 (06-28-21 @ 09:06) (16 - 18)  SpO2: 97% (06-28-21 @ 09:06) (97% - 97%)  AVSS    General: Pt Alert and oriented, NAD  Dressing C/D/I  Pulses:  Sensation:  Motor: EHL/FHL/TA/GS        A/P: 55yFemale POD# s/p   - Stable  - Pain Control  - DVT ppx:  - PT, WBS:     Ortho Pager [phone] Ortho Note    Patient s/p right knee explant and spacer placement on 6/18 by Dr. Fay     Patient readmitted for pain control/IV abx following discharge on Friday 6/25  Expresses frustration with lack of pain control at home- states she was only prescribed celebrex   Is prescribed pain meds outpatient by physician Dr. Louis - states she has none left of her last prescription  Reports concerns over administering IV antibiotics to herself  Came back to Ed yesterday due to above concerns     Vital Signs Last 24 Hrs  T(C): 36.1 (06-28-21 @ 09:06), Max: 36.6 (06-28-21 @ 05:15)  T(F): 97 (06-28-21 @ 09:06), Max: 97.8 (06-28-21 @ 05:15)  HR: 87 (06-28-21 @ 09:06) (87 - 91)  BP: 104/53 (06-28-21 @ 09:06) (101/62 - 104/53)  BP(mean): --  RR: 16 (06-28-21 @ 09:06) (16 - 18)  SpO2: 97% (06-28-21 @ 09:06) (97% - 97%)  AVSS    General: Pt Alert and oriented, NAD  Dressing C/D/I, KI in place   Pulses:  Sensation:  Motor: EHL/FHL/TA/GS      A/P: 55yFemale POD#10 s/p Right knee explant, antibiotic spacer implant, discharged 6/25, readmitted 6/27 for pain control and possible rehab placement     - labs and VSS   - c/w ID recs: patient has picc in place for empirinc vanco 1.5g IV q12, plus cefepime 2g IV q12 through 7/29 for R knee periprosthetic fx  - Pain Control:   Per [facility]- patient receives 120 tabs of oxycodone 30mg every 30 days as prescribed by Dr. Gerson Feliz MD, records indicate prescriptions go back at least 12 months   - Spoke with Dr. Feliz, per our phone conversation on 6/28 he indicates he was "helping her out until she could re-establish care with an Orthopedic Surgeon" and that he does not want to continue to prescribe her opioids and that she should be seen by pain management   - DVT ppx: Aspirin   -  weight-bearing status: PWB 50% x 6 weeks, KI x 2 weeks    - Long discussion with patient about home vs rehab upon discharge. Patient refusing rehab. Discussed at length with Dr. Flavio GONG for patient to go home if home infusion re-established, with prescriptions for post op pain control, outpatient f/u in 2 weeks.   - Dispo: anticipate home with home infusion services tomorrow 6/29 after AM antibiotics       Ortho Pager [phone]

## 2021-06-29 ENCOUNTER — TRANSCRIPTION ENCOUNTER (OUTPATIENT)
Age: 56
End: 2021-06-29

## 2021-06-29 VITALS — SYSTOLIC BLOOD PRESSURE: 101 MMHG | DIASTOLIC BLOOD PRESSURE: 62 MMHG | HEART RATE: 90 BPM

## 2021-06-29 LAB
SURGICAL PATHOLOGY STUDY: SIGNIFICANT CHANGE UP
VANCOMYCIN TROUGH SERPL-MCNC: 13 UG/ML — SIGNIFICANT CHANGE UP (ref 10–20)

## 2021-06-29 PROCEDURE — 80053 COMPREHEN METABOLIC PANEL: CPT

## 2021-06-29 PROCEDURE — 36415 COLL VENOUS BLD VENIPUNCTURE: CPT

## 2021-06-29 PROCEDURE — 85027 COMPLETE CBC AUTOMATED: CPT

## 2021-06-29 PROCEDURE — 96375 TX/PRO/DX INJ NEW DRUG ADDON: CPT

## 2021-06-29 PROCEDURE — G0378: CPT

## 2021-06-29 PROCEDURE — 85025 COMPLETE CBC W/AUTO DIFF WBC: CPT

## 2021-06-29 PROCEDURE — 94640 AIRWAY INHALATION TREATMENT: CPT

## 2021-06-29 PROCEDURE — 80202 ASSAY OF VANCOMYCIN: CPT

## 2021-06-29 PROCEDURE — 99285 EMERGENCY DEPT VISIT HI MDM: CPT | Mod: 25

## 2021-06-29 PROCEDURE — 87635 SARS-COV-2 COVID-19 AMP PRB: CPT

## 2021-06-29 PROCEDURE — 80048 BASIC METABOLIC PNL TOTAL CA: CPT

## 2021-06-29 PROCEDURE — 96374 THER/PROPH/DIAG INJ IV PUSH: CPT

## 2021-06-29 PROCEDURE — 86769 SARS-COV-2 COVID-19 ANTIBODY: CPT

## 2021-06-29 PROCEDURE — 87040 BLOOD CULTURE FOR BACTERIA: CPT

## 2021-06-29 RX ORDER — ONDANSETRON 8 MG/1
1 TABLET, FILM COATED ORAL
Qty: 42 | Refills: 0
Start: 2021-06-29 | End: 2021-07-12

## 2021-06-29 RX ADMIN — HYDROMORPHONE HYDROCHLORIDE 0.5 MILLIGRAM(S): 2 INJECTION INTRAMUSCULAR; INTRAVENOUS; SUBCUTANEOUS at 08:46

## 2021-06-29 RX ADMIN — GABAPENTIN 100 MILLIGRAM(S): 400 CAPSULE ORAL at 13:29

## 2021-06-29 RX ADMIN — HYDROMORPHONE HYDROCHLORIDE 0.5 MILLIGRAM(S): 2 INJECTION INTRAMUSCULAR; INTRAVENOUS; SUBCUTANEOUS at 01:19

## 2021-06-29 RX ADMIN — CEFEPIME 100 MILLIGRAM(S): 1 INJECTION, POWDER, FOR SOLUTION INTRAMUSCULAR; INTRAVENOUS at 10:58

## 2021-06-29 RX ADMIN — CELECOXIB 200 MILLIGRAM(S): 200 CAPSULE ORAL at 05:26

## 2021-06-29 RX ADMIN — HYDROMORPHONE HYDROCHLORIDE 0.5 MILLIGRAM(S): 2 INJECTION INTRAMUSCULAR; INTRAVENOUS; SUBCUTANEOUS at 01:35

## 2021-06-29 RX ADMIN — GABAPENTIN 100 MILLIGRAM(S): 400 CAPSULE ORAL at 05:27

## 2021-06-29 RX ADMIN — HYDROMORPHONE HYDROCHLORIDE 0.5 MILLIGRAM(S): 2 INJECTION INTRAMUSCULAR; INTRAVENOUS; SUBCUTANEOUS at 09:15

## 2021-06-29 RX ADMIN — Medication 1 TABLET(S): at 11:04

## 2021-06-29 RX ADMIN — OXYCODONE HYDROCHLORIDE 10 MILLIGRAM(S): 5 TABLET ORAL at 14:45

## 2021-06-29 RX ADMIN — Medication 3 MILLILITER(S): at 11:03

## 2021-06-29 RX ADMIN — Medication 3 MILLILITER(S): at 00:40

## 2021-06-29 RX ADMIN — PANTOPRAZOLE SODIUM 40 MILLIGRAM(S): 20 TABLET, DELAYED RELEASE ORAL at 06:07

## 2021-06-29 RX ADMIN — Medication 81 MILLIGRAM(S): at 11:04

## 2021-06-29 RX ADMIN — OXYCODONE HYDROCHLORIDE 10 MILLIGRAM(S): 5 TABLET ORAL at 15:15

## 2021-06-29 RX ADMIN — OXYCODONE HYDROCHLORIDE 5 MILLIGRAM(S): 5 TABLET ORAL at 06:13

## 2021-06-29 RX ADMIN — Medication 300 MILLIGRAM(S): at 12:26

## 2021-06-29 RX ADMIN — Medication 3 MILLILITER(S): at 05:27

## 2021-06-29 NOTE — PROGRESS NOTE ADULT - SUBJECTIVE AND OBJECTIVE BOX
POST OPERATIVE DAY #: 11  STATUS POST: right knee explant and abx spacer on 6/18                   SUBJECTIVE: Patient seen and examined at bedside. Pt. states pain is under control now (has not used the MScontin since she ran out a few weeks ago). Pt. would like to go home today.   Denies any sob/cp/n/v/numbness or tingling in b/l les.     OBJECTIVE:     Vital Signs Last 24 Hrs  T(C): 37 (29 Jun 2021 04:49), Max: 37 (29 Jun 2021 04:49)  T(F): 98.6 (29 Jun 2021 04:49), Max: 98.6 (29 Jun 2021 04:49)  HR: 94 (29 Jun 2021 04:49) (89 - 95)  BP: 111/68 (29 Jun 2021 04:49) (95/58 - 111/68)  BP(mean): --  RR: 17 (29 Jun 2021 04:49) (17 - 18)  SpO2: 94% (29 Jun 2021 04:49) (94% - 97%)    Affected extremity: right le          Dressing: clean/dry/intact prevena, with KI in place         Sensation: intact to light touch to patient's baseline         Motor exam: EHL/TA/GS  5/5  Pulses 2+             I&O's Detail    28 Jun 2021 07:01  -  29 Jun 2021 07:00  --------------------------------------------------------  IN:    Oral Fluid: 680 mL  Total IN: 680 mL    OUT:    Voided (mL): 1550 mL  Total OUT: 1550 mL    Total NET: -870 mL      29 Jun 2021 07:01  -  29 Jun 2021 11:31  --------------------------------------------------------  IN:    Oral Fluid: 230 mL  Total IN: 230 mL    OUT:    Voided (mL): 200 mL  Total OUT: 200 mL    Total NET: 30 mL          LABS:                        9.3    8.76  )-----------( 384      ( 28 Jun 2021 07:10 )             31.1     06-28    138  |  101  |  10  ----------------------------<  98  3.9   |  29  |  0.66    Ca    8.9      28 Jun 2021 07:10    TPro  7.7  /  Alb  3.6  /  TBili  0.5  /  DBili  x   /  AST  31  /  ALT  22  /  AlkPhos  169<H>  06-27          MEDICATIONS:  cefepime   IVPB 2000 milliGRAM(s) IV Intermittent every 12 hours  vancomycin  IVPB 1500 milliGRAM(s) IV Intermittent every 12 hours    acetaminophen   Tablet .. 975 milliGRAM(s) Oral every 8 hours PRN  celecoxib 200 milliGRAM(s) Oral two times a day  gabapentin 100 milliGRAM(s) Oral three times a day  HYDROmorphone  Injectable 0.5 milliGRAM(s) IV Push every 4 hours PRN  ondansetron Injectable 4 milliGRAM(s) IV Push every 6 hours PRN  oxyCODONE    IR 5 milliGRAM(s) Oral every 4 hours PRN  oxyCODONE    IR 10 milliGRAM(s) Oral every 4 hours PRN    aspirin  chewable 81 milliGRAM(s) Oral daily        ASSESSMENT AND PLAN: 56yo Female s/p right knee explant and abx spacer on 6/18      1. Analgesic pain control- has been prescribed gabapentin, celebrex, oxycodone, aspirin, and mscontin for discharge.   2. DVT prophylaxis: ASA       3. Weight Bearing Status:  50% Weight bearing in KI  4. Disposition: Home  pending home infusion set up  5. IV abx- will continue vancomycin and cefepime. Vanc trough 13 today.  POST OPERATIVE DAY #: 11  STATUS POST: right knee explant and abx spacer on 6/18                   SUBJECTIVE: Patient seen and examined at bedside. Pt. states pain is under control now (has not used the MScontin since she ran out a few weeks ago). Pt. would like to go home today.   Denies any sob/cp/n/v/numbness or tingling in b/l les.     OBJECTIVE:     Vital Signs Last 24 Hrs  T(C): 37 (29 Jun 2021 04:49), Max: 37 (29 Jun 2021 04:49)  T(F): 98.6 (29 Jun 2021 04:49), Max: 98.6 (29 Jun 2021 04:49)  HR: 94 (29 Jun 2021 04:49) (89 - 95)  BP: 111/68 (29 Jun 2021 04:49) (95/58 - 111/68)  BP(mean): --  RR: 17 (29 Jun 2021 04:49) (17 - 18)  SpO2: 94% (29 Jun 2021 04:49) (94% - 97%)    Affected extremity: right le          Dressing: clean/dry/intact prevena, with KI in place         Sensation: intact to light touch to patient's baseline         Motor exam: EHL/TA/GS  5/5  Pulses 2+             I&O's Detail    28 Jun 2021 07:01  -  29 Jun 2021 07:00  --------------------------------------------------------  IN:    Oral Fluid: 680 mL  Total IN: 680 mL    OUT:    Voided (mL): 1550 mL  Total OUT: 1550 mL    Total NET: -870 mL      29 Jun 2021 07:01  -  29 Jun 2021 11:31  --------------------------------------------------------  IN:    Oral Fluid: 230 mL  Total IN: 230 mL    OUT:    Voided (mL): 200 mL  Total OUT: 200 mL    Total NET: 30 mL          LABS:                        9.3    8.76  )-----------( 384      ( 28 Jun 2021 07:10 )             31.1     06-28    138  |  101  |  10  ----------------------------<  98  3.9   |  29  |  0.66    Ca    8.9      28 Jun 2021 07:10    TPro  7.7  /  Alb  3.6  /  TBili  0.5  /  DBili  x   /  AST  31  /  ALT  22  /  AlkPhos  169<H>  06-27          MEDICATIONS:  cefepime   IVPB 2000 milliGRAM(s) IV Intermittent every 12 hours  vancomycin  IVPB 1500 milliGRAM(s) IV Intermittent every 12 hours    acetaminophen   Tablet .. 975 milliGRAM(s) Oral every 8 hours PRN  celecoxib 200 milliGRAM(s) Oral two times a day  gabapentin 100 milliGRAM(s) Oral three times a day  HYDROmorphone  Injectable 0.5 milliGRAM(s) IV Push every 4 hours PRN  ondansetron Injectable 4 milliGRAM(s) IV Push every 6 hours PRN  oxyCODONE    IR 5 milliGRAM(s) Oral every 4 hours PRN  oxyCODONE    IR 10 milliGRAM(s) Oral every 4 hours PRN    aspirin  chewable 81 milliGRAM(s) Oral daily        ASSESSMENT AND PLAN: 54yo Female s/p right knee explant and abx spacer on 6/18      1. Analgesic pain control- has been prescribed gabapentin, celebrex, oxycodone, aspirin, and mscontin for discharge.   2. DVT prophylaxis: ASA       3. Weight Bearing Status:  50% Weight bearing in KI  4. Disposition: Home  pending home infusion set up  5. IV abx- will continue vancomycin and cefepime. Vanc trough 13 today.     Dc home today. Pt. will

## 2021-06-29 NOTE — PROGRESS NOTE ADULT - SUBJECTIVE AND OBJECTIVE BOX
Ortho Note    Pt seen and examined at bedside this AM, appears comfortable, pain controlled. Denies CP, SOB, N/V, numbness/tingling     Vital Signs Last 24 Hrs  T(C): 37 (06-29-21 @ 04:49), Max: 37 (06-29-21 @ 04:49)  T(F): 98.6 (06-29-21 @ 04:49), Max: 98.6 (06-29-21 @ 04:49)  HR: 94 (06-29-21 @ 04:49) (94 - 94)  BP: 111/68 (06-29-21 @ 04:49) (111/68 - 111/68)  BP(mean): --  RR: 17 (06-29-21 @ 04:49) (17 - 17)  SpO2: 94% (06-29-21 @ 04:49) (94% - 94%)  AVSS    General: Pt Alert and oriented, NAD  R knee prevena intact and functional   KI in place  Sensation intact s/s/sp/dp/t and symmetric   Motor: EHL/FHL/TA/GS 5/5 and symemtric   2+ DP pulse  Toes WWP        A/P: 55yFemale s/p R knee explant and abx spacer 6/18 readmitted for pain control and dispo   - Stable  - Pain Control  - DVT ppx: ASA  - PT, WBS: WBAT in KI  - Continue cefepime and vanc   - Dispo home pending home infusion services setup    Ortho Pager [phone]

## 2021-06-29 NOTE — DISCHARGE NOTE NURSING/CASE MANAGEMENT/SOCIAL WORK - PATIENT PORTAL LINK FT
You can access the FollowMyHealth Patient Portal offered by [facility] by registering at the following website: [email or web address]. By joining [employer]’s FollowMyHealth portal, you will also be able to view your health information using other applications (apps) compatible with our system.

## 2021-06-30 DIAGNOSIS — Z96.651 PRESENCE OF RIGHT ARTIFICIAL KNEE JOINT: Chronic | ICD-10-CM

## 2021-06-30 DIAGNOSIS — Z96.652 PRESENCE OF LEFT ARTIFICIAL KNEE JOINT: Chronic | ICD-10-CM

## 2021-07-03 LAB
CULTURE RESULTS: SIGNIFICANT CHANGE UP
CULTURE RESULTS: SIGNIFICANT CHANGE UP
SPECIMEN SOURCE: SIGNIFICANT CHANGE UP
SPECIMEN SOURCE: SIGNIFICANT CHANGE UP

## 2021-07-04 DIAGNOSIS — T84.033A MECHANICAL LOOSENING OF INTERNAL LEFT KNEE PROSTHETIC JOINT, INITIAL ENCOUNTER: ICD-10-CM

## 2021-07-04 DIAGNOSIS — M65.9 SYNOVITIS AND TENOSYNOVITIS, UNSPECIFIED: ICD-10-CM

## 2021-07-04 DIAGNOSIS — T84.032A MECHANICAL LOOSENING OF INTERNAL RIGHT KNEE PROSTHETIC JOINT, INITIAL ENCOUNTER: ICD-10-CM
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
## 2021-07-04 DIAGNOSIS — E22.2 SYNDROME OF INAPPROPRIATE SECRETION OF ANTIDIURETIC HORMONE: ICD-10-CM

## 2021-07-04 DIAGNOSIS — T84.53XA INFECTION AND INFLAMMATORY REACTION DUE TO INTERNAL RIGHT KNEE PROSTHESIS, INITIAL ENCOUNTER: ICD-10-CM

## 2021-07-04 DIAGNOSIS — J45.909 UNSPECIFIED ASTHMA, UNCOMPLICATED: ICD-10-CM

## 2021-07-04 DIAGNOSIS — D47.3 ESSENTIAL (HEMORRHAGIC) THROMBOCYTHEMIA: ICD-10-CM

## 2021-07-04 DIAGNOSIS — D62 ACUTE POSTHEMORRHAGIC ANEMIA: ICD-10-CM

## 2021-07-06 LAB
GRAM STN FLD: SIGNIFICANT CHANGE UP

## 2021-07-08 DIAGNOSIS — T84.84XA PAIN DUE TO INTERNAL ORTHOPEDIC PROSTHETIC DEVICES, IMPLANTS AND GRAFTS, INITIAL ENCOUNTER: ICD-10-CM

## 2021-07-08 DIAGNOSIS — Z96.653 PRESENCE OF ARTIFICIAL KNEE JOINT, BILATERAL: ICD-10-CM
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
## 2021-07-08 DIAGNOSIS — J45.909 UNSPECIFIED ASTHMA, UNCOMPLICATED: ICD-10-CM

## 2021-07-08 DIAGNOSIS — M25.561 PAIN IN RIGHT KNEE: ICD-10-CM

## 2021-07-08 LAB
GRAM STN FLD: SIGNIFICANT CHANGE UP

## 2021-07-09 PROBLEM — J45.909 UNSPECIFIED ASTHMA, UNCOMPLICATED: Chronic | Status: ACTIVE | Noted: 2021-06-30

## 2021-07-09 LAB
CULTURE RESULTS: NO GROWTH — SIGNIFICANT CHANGE UP
CULTURE RESULTS: NO GROWTH — SIGNIFICANT CHANGE UP
SPECIMEN SOURCE: SIGNIFICANT CHANGE UP
SPECIMEN SOURCE: SIGNIFICANT CHANGE UP

## 2021-07-10 LAB
CULTURE RESULTS: NO GROWTH — SIGNIFICANT CHANGE UP
SPECIMEN SOURCE: SIGNIFICANT CHANGE UP

## 2021-07-13 ENCOUNTER — APPOINTMENT (OUTPATIENT)
Dept: ORTHOPEDIC SURGERY | Facility: CLINIC | Age: 56
End: 2021-07-13
Payer: MEDICARE

## 2021-07-13 PROCEDURE — 99024 POSTOP FOLLOW-UP VISIT: CPT

## 2021-07-13 NOTE — HISTORY OF PRESENT ILLNESS
[de-identified] : s/p right knee component explantation and application of antibiotic cement spacer, 6/18/21.  [de-identified] : Has been managing her IV abx at home. Pain not well controlled; complains that she ran out of oxycodone four days ago (had been on a regimen of 120mg/day as supplied by her cardiologist/PCP Dr. Feliz). Also ran out of Celebrex. Never picked up the rest of the multimodal regimen from her discharge from the hospital. Has been compliant with KI but could use another one. No wound or systemic issues.  [de-identified] : R knee incision healed, benign appearing. KI in bad condition. Ambulating with walker, essentially FWB RLE. 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 Feliz to iron out the pain mgmt issue. Agreed that he will continue with gradual opioid taper while I will continue prescribing the rest of the multimodal regimen. Also gave her a pain mgmt referral for ongoing care\par - Finish out 6wk IV abx\par - RTC in 6wk for repeat R knee aspiration following antibiotic holiday

## 2021-07-17 LAB

## 2021-07-22 ENCOUNTER — APPOINTMENT (OUTPATIENT)
Dept: INFECTIOUS DISEASE | Facility: CLINIC | Age: 56
End: 2021-07-22
Payer: MEDICARE

## 2021-07-22 ENCOUNTER — NON-APPOINTMENT (OUTPATIENT)
Age: 56
End: 2021-07-22

## 2021-07-22 VITALS
SYSTOLIC BLOOD PRESSURE: 127 MMHG | HEIGHT: 65 IN | WEIGHT: 185 LBS | BODY MASS INDEX: 30.82 KG/M2 | TEMPERATURE: 97.2 F | OXYGEN SATURATION: 99 % | DIASTOLIC BLOOD PRESSURE: 74 MMHG | HEART RATE: 83 BPM

## 2021-07-22 PROCEDURE — 99072 ADDL SUPL MATRL&STAF TM PHE: CPT

## 2021-07-22 PROCEDURE — 99214 OFFICE O/P EST MOD 30 MIN: CPT

## 2021-07-22 RX ORDER — VANCOMYCIN HYDROCHLORIDE 250 MG/5ML
SOLUTION ORAL
Refills: 0 | Status: ACTIVE | COMMUNITY

## 2021-07-22 RX ORDER — CEFEPIME 1 G/50ML
INJECTION, SOLUTION INTRAVENOUS
Refills: 0 | Status: ACTIVE | COMMUNITY

## 2021-07-22 NOTE — ASSESSMENT
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 She was briefly readmitted for pain control and also had difficulty with self-administration of IV antibiotics at home. Declined DELILAH. Was discharged with new infusion company ([facility]). Persistent RLE edema and knee swelling on exam. Labs last week show sig improvement in inflammatory markers.\par - to f/u this week and next week's labs\par - continue vancomycin 1.5g IV q12h plus cefepime 2g IV q12h through 7/29/21 to complete 6 week course\par - plan for diagnostic arthrocentesis following this course noted\par - following reimplantation, advise 3-6 mos post op ppx with PO cephalexin\par \par  [Treatment Education] : treatment education [Treatment Adherence] : treatment adherence [Anticipatory Guidance] : anticipatory guidance

## 2021-07-22 NOTE — HISTORY OF PRESENT ILLNESS
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 She was briefly readmitted for pain control and also had difficulty with self-administration of IV antibiotics at home. Declined DELILAH. Was discharged with new infusion company ([facility]).\par Saw orthopedist last week.\par Reporting skin irritation with R knee brace--applying topical steroid to R foot. \par Tolerating vancomycin and cefepime. Did not administer doses this AM but plans to administer later today. \par No fever or chills. \par

## 2021-07-22 NOTE — PHYSICAL EXAM
[General Appearance - Alert] : alert [General Appearance - In No Acute Distress] : in no acute distress [Auscultation Breath Sounds / Voice Sounds] : lungs were clear to auscultation bilaterally [Heart Rate And Rhythm] : heart rate was normal and rhythm regular [Heart Sounds] : normal S1 and S2 [Heart Sounds Gallop] : no gallops [Murmurs] : no murmurs [Heart Sounds Pericardial Friction Rub] : no pericardial rub [Bowel Sounds] : normal bowel sounds [Abdomen Soft] : soft [Abdomen Tenderness] : non-tender [] : no hepato-splenomegaly [Abdomen Mass (___ Cm)] : no abdominal mass palpated [Costovertebral Angle Tenderness] : no CVA tenderness [FreeTextEntry1] : erythema R ankle area [Deep Tendon Reflexes (DTR)] : deep tendon reflexes were 2+ and symmetric [Sensation] : the sensory exam was normal to light touch and pinprick [No Focal Deficits] : no focal deficits [Oriented To Time, Place, And Person] : oriented to person, place, and time [Affect] : the affect was normal

## 2021-07-30 ENCOUNTER — NON-APPOINTMENT (OUTPATIENT)
Age: 56
End: 2021-07-30

## 2021-08-02 ENCOUNTER — NON-APPOINTMENT (OUTPATIENT)
Age: 56
End: 2021-08-02

## 2021-08-07 LAB

## 2021-08-10 ENCOUNTER — LABORATORY RESULT (OUTPATIENT)
Age: 56
End: 2021-08-10

## 2021-08-10 ENCOUNTER — APPOINTMENT (OUTPATIENT)
Dept: ORTHOPEDIC SURGERY | Facility: CLINIC | Age: 56
End: 2021-08-10
Payer: MEDICARE

## 2021-08-10 ENCOUNTER — RESULT REVIEW (OUTPATIENT)
Age: 56
End: 2021-08-10

## 2021-08-10 ENCOUNTER — OUTPATIENT (OUTPATIENT)
Dept: OUTPATIENT SERVICES | Facility: HOSPITAL | Age: 56
LOS: 1 days | End: 2021-08-10
Payer: MEDICARE

## 2021-08-10 DIAGNOSIS — Z96.652 PRESENCE OF LEFT ARTIFICIAL KNEE JOINT: Chronic | ICD-10-CM

## 2021-08-10 DIAGNOSIS — Z96.651 PRESENCE OF RIGHT ARTIFICIAL KNEE JOINT: Chronic | ICD-10-CM

## 2021-08-10 PROCEDURE — 73562 X-RAY EXAM OF KNEE 3: CPT | Mod: 26,RT

## 2021-08-10 PROCEDURE — 73562 X-RAY EXAM OF KNEE 3: CPT

## 2021-08-10 PROCEDURE — 20610 DRAIN/INJ JOINT/BURSA W/O US: CPT | Mod: 58,RT

## 2021-08-10 PROCEDURE — 99024 POSTOP FOLLOW-UP VISIT: CPT

## 2021-08-10 NOTE — HISTORY OF PRESENT ILLNESS
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 [de-identified] : Doing well. Ambulating with or without knee brace, and rollator. Pain improved from prior. No wound problems. Had some rash a couple of weeks ago that has been steadily resolving since discontinuing antibiotics as scheduled on July 29th. Hasn't noted any increase in right knee pain, swelling, or erythema since starting the abx holiday. [de-identified] : Right knee incision healed. Fixed in extension and slight varus. No instability. Ambulating with rollator. [de-identified] : Right static antibiotic cement spacer has collapsed into mild varus without spacer or bone fracture.  [de-identified] : 54y/o female s/p R knee explant and static spacer for PJI, s/p 6 weeks of IV antibiotics and nearly 2 week antibiotic holiday [de-identified] : Right knee aspiration performed today, scant fluid sent for cell count and cultures. Also applied to [employer] alpha-defensin assay today which was negative for infection at 10, 15, and 20 minutes.\par Serum labwork today\par Callback with results of above. If all continues to appear benign then will schedule for right knee reimplantation

## 2021-08-10 NOTE — PROCEDURE
[Aspiration] : Aspiration [Right] : of the right [Knee Joint] : knee joint [Diagnostic] : Diagnostic [Patient] : patient [Alcohol] : Alcohol [Betadine] : Betadine [Ethyl Chloride Spray] : ethyl chloride spray was used as a topical anesthetic [Lateral] : lateral [Superior] : superior [18] : an 18-gauge [Spinal] : spinal needle [___ mL Fluid] : [unfilled] mL of [Clear] : clear [Bloody] : bloody [Bandage Applied] : a bandage [Culture] : culture [Cell Count] : cell count [Gram Stain] : gram stain [Tolerated Well] : The patient tolerated the procedure well [None] : none

## 2021-08-11 ENCOUNTER — NON-APPOINTMENT (OUTPATIENT)
Age: 56
End: 2021-08-11

## 2021-08-12 DIAGNOSIS — T84.53XS: ICD-10-CM

## 2021-08-17 ENCOUNTER — APPOINTMENT (OUTPATIENT)
Dept: ORTHOPEDIC SURGERY | Facility: CLINIC | Age: 56
End: 2021-08-17

## 2021-08-20 ENCOUNTER — OUTPATIENT (OUTPATIENT)
Dept: OUTPATIENT SERVICES | Facility: HOSPITAL | Age: 56
LOS: 1 days | End: 2021-08-20
Payer: MEDICARE

## 2021-08-20 ENCOUNTER — APPOINTMENT (OUTPATIENT)
Dept: ORTHOPEDIC SURGERY | Facility: CLINIC | Age: 56
End: 2021-08-20

## 2021-08-20 ENCOUNTER — RESULT REVIEW (OUTPATIENT)
Age: 56
End: 2021-08-20

## 2021-08-20 ENCOUNTER — APPOINTMENT (OUTPATIENT)
Dept: ORTHOPEDIC SURGERY | Facility: CLINIC | Age: 56
End: 2021-08-20
Payer: MEDICARE

## 2021-08-20 VITALS
HEART RATE: 102 BPM | BODY MASS INDEX: 30.82 KG/M2 | OXYGEN SATURATION: 98 % | WEIGHT: 185 LBS | HEIGHT: 65 IN | DIASTOLIC BLOOD PRESSURE: 80 MMHG | SYSTOLIC BLOOD PRESSURE: 130 MMHG

## 2021-08-20 DIAGNOSIS — Z96.651 PRESENCE OF RIGHT ARTIFICIAL KNEE JOINT: Chronic | ICD-10-CM

## 2021-08-20 DIAGNOSIS — Z01.818 ENCOUNTER FOR OTHER PREPROCEDURAL EXAMINATION: ICD-10-CM

## 2021-08-20 DIAGNOSIS — Z96.652 PRESENCE OF LEFT ARTIFICIAL KNEE JOINT: Chronic | ICD-10-CM

## 2021-08-20 LAB
A1C WITH ESTIMATED AVERAGE GLUCOSE RESULT: 5.1 % — SIGNIFICANT CHANGE UP (ref 4–5.6)
ALBUMIN SERPL ELPH-MCNC: 4.3 G/DL — SIGNIFICANT CHANGE UP (ref 3.3–5)
ALP SERPL-CCNC: 146 U/L — HIGH (ref 40–120)
ALT FLD-CCNC: 21 U/L — SIGNIFICANT CHANGE UP (ref 10–45)
ANION GAP SERPL CALC-SCNC: 10 MMOL/L — SIGNIFICANT CHANGE UP (ref 5–17)
APTT BLD: 34.5 SEC — SIGNIFICANT CHANGE UP (ref 27.5–35.5)
AST SERPL-CCNC: 33 U/L — SIGNIFICANT CHANGE UP (ref 10–40)
BASOPHILS # BLD AUTO: 0.04 K/UL — SIGNIFICANT CHANGE UP (ref 0–0.2)
BASOPHILS NFR BLD AUTO: 0.7 % — SIGNIFICANT CHANGE UP (ref 0–2)
BILIRUB SERPL-MCNC: 0.2 MG/DL — SIGNIFICANT CHANGE UP (ref 0.2–1.2)
BUN SERPL-MCNC: 11 MG/DL — SIGNIFICANT CHANGE UP (ref 7–23)
CALCIUM SERPL-MCNC: 9.5 MG/DL — SIGNIFICANT CHANGE UP (ref 8.4–10.5)
CHLORIDE SERPL-SCNC: 107 MMOL/L — SIGNIFICANT CHANGE UP (ref 96–108)
CO2 SERPL-SCNC: 26 MMOL/L — SIGNIFICANT CHANGE UP (ref 22–31)
CREAT SERPL-MCNC: 0.61 MG/DL — SIGNIFICANT CHANGE UP (ref 0.5–1.3)
EOSINOPHIL # BLD AUTO: 0.99 K/UL — HIGH (ref 0–0.5)
EOSINOPHIL NFR BLD AUTO: 16.4 % — HIGH (ref 0–6)
ESTIMATED AVERAGE GLUCOSE: 100 MG/DL — SIGNIFICANT CHANGE UP (ref 68–114)
GLUCOSE SERPL-MCNC: 94 MG/DL — SIGNIFICANT CHANGE UP (ref 70–99)
HCT VFR BLD CALC: 39.6 % — SIGNIFICANT CHANGE UP (ref 34.5–45)
HGB BLD-MCNC: 12 G/DL — SIGNIFICANT CHANGE UP (ref 11.5–15.5)
IMM GRANULOCYTES NFR BLD AUTO: 0.2 % — SIGNIFICANT CHANGE UP (ref 0–1.5)
INR BLD: 1.03 — SIGNIFICANT CHANGE UP (ref 0.88–1.16)
LYMPHOCYTES # BLD AUTO: 2.58 K/UL — SIGNIFICANT CHANGE UP (ref 1–3.3)
LYMPHOCYTES # BLD AUTO: 42.6 % — SIGNIFICANT CHANGE UP (ref 13–44)
MCHC RBC-ENTMCNC: 28 PG — SIGNIFICANT CHANGE UP (ref 27–34)
MCHC RBC-ENTMCNC: 30.3 GM/DL — LOW (ref 32–36)
MCV RBC AUTO: 92.3 FL — SIGNIFICANT CHANGE UP (ref 80–100)
MONOCYTES # BLD AUTO: 0.48 K/UL — SIGNIFICANT CHANGE UP (ref 0–0.9)
MONOCYTES NFR BLD AUTO: 7.9 % — SIGNIFICANT CHANGE UP (ref 2–14)
NEUTROPHILS # BLD AUTO: 1.95 K/UL — SIGNIFICANT CHANGE UP (ref 1.8–7.4)
NEUTROPHILS NFR BLD AUTO: 32.2 % — LOW (ref 43–77)
NRBC # BLD: 0 /100 WBCS — SIGNIFICANT CHANGE UP (ref 0–0)
PLATELET # BLD AUTO: 393 K/UL — SIGNIFICANT CHANGE UP (ref 150–400)
POTASSIUM SERPL-MCNC: 4.5 MMOL/L — SIGNIFICANT CHANGE UP (ref 3.5–5.3)
POTASSIUM SERPL-SCNC: 4.5 MMOL/L — SIGNIFICANT CHANGE UP (ref 3.5–5.3)
PROT SERPL-MCNC: 6.8 G/DL — SIGNIFICANT CHANGE UP (ref 6–8.3)
PROTHROM AB SERPL-ACNC: 12.3 SEC — SIGNIFICANT CHANGE UP (ref 10.6–13.6)
RBC # BLD: 4.29 M/UL — SIGNIFICANT CHANGE UP (ref 3.8–5.2)
RBC # FLD: 15 % — HIGH (ref 10.3–14.5)
SODIUM SERPL-SCNC: 143 MMOL/L — SIGNIFICANT CHANGE UP (ref 135–145)
WBC # BLD: 6.05 K/UL — SIGNIFICANT CHANGE UP (ref 3.8–10.5)
WBC # FLD AUTO: 6.05 K/UL — SIGNIFICANT CHANGE UP (ref 3.8–10.5)

## 2021-08-20 PROCEDURE — 80053 COMPREHEN METABOLIC PANEL: CPT

## 2021-08-20 PROCEDURE — 85610 PROTHROMBIN TIME: CPT

## 2021-08-20 PROCEDURE — 85025 COMPLETE CBC W/AUTO DIFF WBC: CPT

## 2021-08-20 PROCEDURE — 83036 HEMOGLOBIN GLYCOSYLATED A1C: CPT

## 2021-08-20 PROCEDURE — 99024 POSTOP FOLLOW-UP VISIT: CPT

## 2021-08-20 PROCEDURE — 71046 X-RAY EXAM CHEST 2 VIEWS: CPT

## 2021-08-20 PROCEDURE — 71046 X-RAY EXAM CHEST 2 VIEWS: CPT | Mod: 26

## 2021-08-20 PROCEDURE — 85730 THROMBOPLASTIN TIME PARTIAL: CPT

## 2021-08-20 RX ORDER — ASPIRIN 325 MG/1
TABLET, FILM COATED ORAL
Refills: 0 | Status: COMPLETED | COMMUNITY
End: 2021-08-20

## 2021-08-23 NOTE — ADDENDUM
[FreeTextEntry1] : Labs/EKG/Imaging reviewed.  ALk Phos mildly elevated at 146.  Can be followed as outpatient.  No acute abnormalities.  \par \par PENDING COVID SCREEN , Patient may proceed with scheduled surgery.\par

## 2021-08-23 NOTE — ASSESSMENT
[Patient Optimized for Surgery] : Patient optimized for surgery [No Further Testing Recommended] : no further testing recommended [As per surgery] : as per surgery [FreeTextEntry4] : The patient is a 55 year old woman, a smoker with history of mild intermittent asthma presenting with right knee periprosthetic complication. The patient is planned for Right Knee Periprosthetic Revision Arthroplasty with Dr. Kevon Fay on 8/26/2021\par \par -Labs/Diagnostics reviewed with patient in detail\par -METS >4\par -RCRI = 0, 3.9% 30-day risk of death/MI/cardiac arrest\par \par Patient is at low risk for moderate risk surgery/procedure\par \par Pending labs/diagnostics, Patient is MEDICALLY OPTIMIZED TO PROCEED WITH PROPOSED SURGERY.\par \par The risks/benefits/potential complications of diagnostic testing/treatments were described in detail.\par \par **Case discussed with patient's specialist(s).\par \par RECOMMENDED SMOKING CESSATION\par

## 2021-08-23 NOTE — HISTORY OF PRESENT ILLNESS
[No Pertinent Cardiac History] : no history of aortic stenosis, atrial fibrillation, coronary artery disease, recent myocardial infarction, or implantable device/pacemaker [Asthma] : asthma [Smoker] : smoker [No Adverse Anesthesia Reaction] : no adverse anesthesia reaction in self or family member [(Patient denies any chest pain, claudication, dyspnea on exertion, orthopnea, palpitations or syncope)] : Patient denies any chest pain, claudication, dyspnea on exertion, orthopnea, palpitations or syncope [Moderate (4-6 METs)] : Moderate (4-6 METs) [Chronic Anticoagulation] : no chronic anticoagulation [Chronic Kidney Disease] : no chronic kidney disease [Diabetes] : no diabetes [FreeTextEntry1] : Right Knee Periprosthetic Revision Arthroplasty [FreeTextEntry2] : 8/26/2021 [FreeTextEntry3] : Dr. Kevon Fay [FreeTextEntry4] : The patient is a 55 year old woman, a smoker with history of mild intermittent asthma presenting with right knee periprosthetic complication. The patient is planned for Right Knee Periprosthetic Revision Arthroplasty with Dr. Kevon Fay on 8/26/2021\par \par The patient denies recent/active cardiopulmonary symptoms including chest pain, shortness of breath, dyspnea of exertion, palpitations, swelling, headache, dizziness, syncope.\par \par

## 2021-08-24 ENCOUNTER — LABORATORY RESULT (OUTPATIENT)
Age: 56
End: 2021-08-24

## 2021-08-26 ENCOUNTER — APPOINTMENT (OUTPATIENT)
Dept: ORTHOPEDIC SURGERY | Facility: HOSPITAL | Age: 56
End: 2021-08-26

## 2021-08-26 ENCOUNTER — INPATIENT (INPATIENT)
Facility: HOSPITAL | Age: 56
LOS: 0 days | Discharge: HOME | End: 2021-08-26
Attending: ORTHOPAEDIC SURGERY | Admitting: ORTHOPAEDIC SURGERY

## 2021-08-26 DIAGNOSIS — Z96.652 PRESENCE OF LEFT ARTIFICIAL KNEE JOINT: Chronic | ICD-10-CM

## 2021-08-26 DIAGNOSIS — Z96.651 PRESENCE OF RIGHT ARTIFICIAL KNEE JOINT: Chronic | ICD-10-CM

## 2021-08-26 RX ORDER — CELECOXIB 200 MG/1
200 CAPSULE ORAL TWICE DAILY
Qty: 60 | Refills: 2 | Status: ACTIVE | COMMUNITY
Start: 2021-07-13 | End: 1900-01-01

## 2021-08-26 RX ORDER — CEFPODOXIME PROXETIL 200 MG/1
200 TABLET, FILM COATED ORAL
Qty: 10 | Refills: 0 | Status: ACTIVE | COMMUNITY
Start: 2021-08-26 | End: 1900-01-01

## 2021-08-26 RX ORDER — ASPIRIN 81 MG/1
81 TABLET ORAL
Qty: 60 | Refills: 0 | Status: ACTIVE | COMMUNITY
Start: 2021-08-26 | End: 1900-01-01

## 2021-08-26 RX ORDER — PANTOPRAZOLE 40 MG/1
40 TABLET, DELAYED RELEASE ORAL DAILY
Qty: 30 | Refills: 2 | Status: ACTIVE | COMMUNITY
Start: 2021-08-26 | End: 1900-01-01

## 2021-08-26 RX ORDER — MORPHINE SULFATE 15 MG/1
15 TABLET, FILM COATED, EXTENDED RELEASE ORAL
Qty: 28 | Refills: 0 | Status: ACTIVE | COMMUNITY
Start: 2021-08-26 | End: 1900-01-01

## 2021-08-26 RX ORDER — ACETAMINOPHEN 500 MG/1
500 TABLET ORAL
Qty: 180 | Refills: 2 | Status: ACTIVE | COMMUNITY
Start: 1900-01-01 | End: 1900-01-01

## 2021-08-26 RX ORDER — OXYCODONE 5 MG/1
5 TABLET ORAL
Qty: 50 | Refills: 0 | Status: ACTIVE | COMMUNITY
Start: 2021-08-26 | End: 1900-01-01

## 2021-08-26 NOTE — PROGRESS NOTE ADULT - SUBJECTIVE AND OBJECTIVE BOX
Patient was scheduled to undergo a revision right total knee replacement for the final stage of a septic explant. We had previously discussed the need for Plastic Surgery cooperation based on her longstanding fixed varus deformity with associated bone loss, which I feel places her at high risk for requiring a rotational muscle flap and skin grafting. Because she has a lot of difficulty mobilizing, we elected to spare her from an outpatient visit with Dr. Baker and instead opted to have him explain his portion of the procedure and take informed consent in the preoperative staging area today. When he explained the procedure to her, she became upset and refused to proceed. She requested more time to discuss with her family and read about the muscle flap and skin graft.     Her procedure is therefore canceled for today. She will follow up with Dr. Baker next week in his office to have a full discussion and hopefully be rebooked for surgery soon. Will also obtain a urine nicotine/cotinine test today as per Dr. Baker's request.

## 2021-08-31 DIAGNOSIS — Z47.89 ENCOUNTER FOR OTHER ORTHOPEDIC AFTERCARE: ICD-10-CM
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
## 2021-08-31 DIAGNOSIS — Z53.29 PROCEDURE AND TREATMENT NOT CARRIED OUT BECAUSE OF PATIENT'S DECISION FOR OTHER REASONS: ICD-10-CM

## 2021-08-31 DIAGNOSIS — T84.092A OTHER MECHANICAL COMPLICATION OF INTERNAL RIGHT KNEE PROSTHESIS, INITIAL ENCOUNTER: ICD-10-CM

## 2021-08-31 DIAGNOSIS — M00.9 PYOGENIC ARTHRITIS, UNSPECIFIED: ICD-10-CM

## 2021-09-09 LAB — FUNGUS FLD CULT: NORMAL

## 2021-09-20 ENCOUNTER — APPOINTMENT (OUTPATIENT)
Dept: ORTHOPEDIC SURGERY | Facility: CLINIC | Age: 56
End: 2021-09-20

## 2021-09-27 ENCOUNTER — APPOINTMENT (OUTPATIENT)
Dept: ORTHOPEDIC SURGERY | Facility: CLINIC | Age: 56
End: 2021-09-27

## 2021-10-11 ENCOUNTER — NON-APPOINTMENT (OUTPATIENT)
Age: 56
End: 2021-10-11

## 2021-10-11 ENCOUNTER — APPOINTMENT (OUTPATIENT)
Dept: ORTHOPEDIC SURGERY | Facility: CLINIC | Age: 56
End: 2021-10-11
Payer: MEDICARE

## 2021-10-11 DIAGNOSIS — Z96.659 INFECTION AND INFLAMMATORY REACTION DUE TO OTHER INTERNAL JOINT PROSTHESIS, INITIAL ENCOUNTER: ICD-10-CM

## 2021-10-11 DIAGNOSIS — T84.59XA INFECTION AND INFLAMMATORY REACTION DUE TO OTHER INTERNAL JOINT PROSTHESIS, INITIAL ENCOUNTER: ICD-10-CM

## 2021-10-11 PROCEDURE — 73562 X-RAY EXAM OF KNEE 3: CPT | Mod: LT

## 2021-10-11 PROCEDURE — 99215 OFFICE O/P EST HI 40 MIN: CPT

## 2021-10-11 PROCEDURE — 73560 X-RAY EXAM OF KNEE 1 OR 2: CPT | Mod: RT

## 2021-10-11 NOTE — PHYSICAL EXAM
[de-identified] : General appearance: well nourished and hydrated, pleasant, alert and oriented x 3, cooperative. Obese, pendulous thighs. Redness to both lower extremities. Bilateral pes planus.\par HEENT: Normocephalic, EOM intact, Nasal septum midline, Oral cavity clear, External auditory canal clear.\par Cardiovascular: no apparent abnormalities, bilateral lower leg edema, no varicosities, pedal pulses are palpable.\par Lymphatics/Lymph nodes: none palpated, Lymphedema: not present.\par Neurologic: sensation is normal, no muscle weakness in upper or lower extremities, patella tendon reflexes intact. \par Dermatologic: no apparent skin lesions, moist, warm, no rash.\par Spine: cervical spine appears normal and moves freely, thoracic spine appears normal and moves freely, lumbosacral spine is limited. Kyphosis. \par Gait: non-antalgic. Uses walker to ambulate. \par \par RIGHT Knee\par Inspection: no effusion or erythema. varus deformity.\par Wounds: none. \par Alignment: normal. 10 degree flexion contracture.\par Palpation: no specific tenderness on palpation.\par ROM: Active (in degrees): \par Ligamentous laxity (neg): all ligaments appear stable, negative ant. drawer test, negative post. drawer test, stable to varus stress test, stable to valgus stress test, negative Lachman's test, negative pivot shift test.\par Meniscal test: negative Bijan's, negative Bar.\par Patellofemoral Alignment Test: Q-angle-, normal.\par Muscle Test: good quad strength.\par Leg examination: calf is soft and non-tender.\par \par LEFT Knee\par Inspection: no effusion or erythema.\par Wounds: healed midline incision \par Alignment: normal.\par Palpation: no specific tenderness on palpation.\par ROM: Active (in degrees): 0-115\par Ligamentous laxity (neg): all ligaments appear stable, negative ant. drawer test, negative post. drawer test, stable to varus stress test, stable to valgus stress test, negative Lachman's test, negative pivot shift test.\par Meniscal test: negative Bijan's, negative Bar.\par Patellofemoral Alignment Test: Q-angle-, normal.\par Muscle Test: good quad strength.\par Leg examination: calf is soft and non-tender.\par \par  [de-identified] : RIGHT knee x-ray, AP, lateral, merchant view taken at the office today demonstrates resection arthroplasty with cement space and M wendi and varus alignment, as well as significant tibial bone loss. \par \par LEFT knee x-ray, AP, lateral, merchant view taken at the office today demonstrates loosening femoral and tibial stems.\par \par

## 2021-10-11 NOTE — DISCUSSION/SUMMARY
[Surgical risks reviewed] : Surgical risks reviewed [de-identified] : This is a difficult case. She's had a prior recent arthroplasty and it appears that she has significant varus bone loss. If she wishes to transfer she will need debridement and a deep culture. Because I did not perform the previous procedures, she will have to be put back on another full 6-8 week course IV antibiotics. If there is no sign of infection, then we would move forward with a reimplantation and a significant augmentation of the tibia. I explained that she may need plastic surgery and a possible gastrocnemius flap or free flap and skin graft surgery. Worst case, if it is difficult to eradicate the infection or significant would complications, she would need an amputation. She seems to understand the severity of the situation. I have ordered CBC, ESR, CRP, and D-DIMER as a baseline to rule out infection. She is scheduled to remove her current cement spacer and placement of a new static antibiotic impregnated cement spacer and new knee immobilizer. She'll need pre-op pulmonary and medical clearance. Left knee appears to have TKR with loosening, and may ultimately have to be replaced, but we need to resolve her right knee issues first. \par

## 2021-10-11 NOTE — END OF VISIT
[FreeTextEntry3] : This note was written by Rocio Velasquez on 10/11/2021 acting as scribe for Dr. Parth Barraza M.D.\par \par I, Dr. Parth Barraza, have read and attest that all the information, medical decision making and discharge instructions within are true and accurate.\par

## 2021-10-11 NOTE — HISTORY OF PRESENT ILLNESS
[de-identified] : 55 year old female presents for follow-up evaluation of bilateral knee pain. The patient was seen in the office in 2019 for left knee TKR loosening but was lost to follow up. She had a previous infection in her knee in 2012, which was treated with multiple I&Ds and placement of a static space at [facility]. She was converted to a TKR in 2015 at [facility]. Her right knee underwent a TKR in 2018 at [facility] and was doing well until 2020. She was seen by Dr. Fay at the time and was noted to have varus collapse of her tibial implant. Again she was lost to follow up. 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 She completed home IV antibiotics and planned for reimplantation in August but refused on the day of surgery. She noticed progressive deformity of the right leg. The patient continues to have pain in both legs and is non-weight bearing to the right leg. She uses a walker for ambulation and denies fever or chills.\par

## 2021-10-11 NOTE — REASON FOR VISIT
[Follow-Up Visit] : a follow-up visit for [Knee Pain] : knee pain [Artificial Knee Joint] : artificial knee joint

## 2021-11-30 ENCOUNTER — APPOINTMENT (OUTPATIENT)
Dept: ORTHOPEDIC SURGERY | Facility: CLINIC | Age: 56
End: 2021-11-30

## 2021-12-27 ENCOUNTER — APPOINTMENT (OUTPATIENT)
Dept: ORTHOPEDIC SURGERY | Facility: CLINIC | Age: 56
End: 2021-12-27

## 2022-01-25 ENCOUNTER — NON-APPOINTMENT (OUTPATIENT)
Age: 57
End: 2022-01-25

## 2022-01-28 ENCOUNTER — APPOINTMENT (OUTPATIENT)
Dept: ORTHOPEDIC SURGERY | Facility: CLINIC | Age: 57
End: 2022-01-28
Payer: MEDICARE

## 2022-01-28 VITALS
WEIGHT: 185 LBS | HEIGHT: 65 IN | DIASTOLIC BLOOD PRESSURE: 80 MMHG | BODY MASS INDEX: 30.82 KG/M2 | HEART RATE: 99 BPM | SYSTOLIC BLOOD PRESSURE: 128 MMHG

## 2022-01-28 DIAGNOSIS — M25.562 PAIN IN RIGHT KNEE: ICD-10-CM

## 2022-01-28 DIAGNOSIS — M25.561 PAIN IN RIGHT KNEE: ICD-10-CM

## 2022-01-28 PROCEDURE — 99214 OFFICE O/P EST MOD 30 MIN: CPT

## 2022-01-28 PROCEDURE — 73562 X-RAY EXAM OF KNEE 3: CPT | Mod: 50

## 2022-02-14 ENCOUNTER — TRANSCRIPTION ENCOUNTER (OUTPATIENT)
Age: 57
End: 2022-02-14

## 2022-02-16 NOTE — DISCHARGE NOTE PROVIDER - NSDCFUSCHEDAPPT_GEN_ALL_CORE_FT
Detail Level: Detailed Quality 130: Documentation Of Current Medications In The Medical Record: Current Medications Documented CLARE ELAM ; 07/22/2021 ; P Med  [address]

## 2022-02-22 ENCOUNTER — TRANSCRIPTION ENCOUNTER (OUTPATIENT)
Age: 57
End: 2022-02-22

## 2023-02-03 NOTE — PATIENT PROFILE ADULT - FALL HARM RISK
Finasteride Counseling:  I discussed with the patient the risks of use of finasteride including but not limited to decreased libido, decreased ejaculate volume, gynecomastia, and depression. Women should not handle medication.  All of the patient's questions and concerns were addressed. Finasteride Male Counseling: Finasteride Counseling:  I discussed with the patient the risks of use of finasteride including but not limited to decreased libido, decreased ejaculate volume, gynecomastia, and depression. Women should not handle medication.  All of the patient's questions and concerns were addressed. other

## 2023-04-10 NOTE — PROGRESS NOTE ADULT - PROVIDER SPECIALTY LIST ADULT
Orthopedics
Patient requests all Lab, Cardiology, and Radiology Results on their Discharge Instructions

## 2023-07-17 NOTE — PATIENT PROFILE ADULT - FUNCTIONAL SCREEN CURRENT LEVEL: SWALLOWING (IF SCORE 2 OR MORE FOR ANY ITEM, CONSULT REHAB SERVICES), MLM)
Admission Reconciliation is Completed  Discharge Reconciliation is Completed
0 = swallows foods/liquids without difficulty

## 2023-10-31 NOTE — ED PROVIDER NOTE - CARDIAC, MLM
previous_has_had_botox Have You Had Botox Before?: has had botox Normal rate, regular rhythm.  Heart sounds S1, S2.  No murmurs, rubs or gallops. Normal rate, regular rhythm.  Heart sounds S1, S2.

## 2023-12-08 ENCOUNTER — APPOINTMENT (OUTPATIENT)
Dept: ORTHOPEDIC SURGERY | Facility: CLINIC | Age: 58
End: 2023-12-08

## 2023-12-15 ENCOUNTER — APPOINTMENT (OUTPATIENT)
Dept: ORTHOPEDIC SURGERY | Facility: CLINIC | Age: 58
End: 2023-12-15

## 2023-12-27 ENCOUNTER — APPOINTMENT (OUTPATIENT)
Dept: ORTHOPEDIC SURGERY | Facility: CLINIC | Age: 58
End: 2023-12-27

## 2024-01-03 ENCOUNTER — APPOINTMENT (OUTPATIENT)
Dept: PHYSICAL MEDICINE AND REHAB | Facility: CLINIC | Age: 59
End: 2024-01-03

## 2024-01-24 ENCOUNTER — APPOINTMENT (OUTPATIENT)
Dept: PHYSICAL MEDICINE AND REHAB | Facility: CLINIC | Age: 59
End: 2024-01-24

## 2024-02-23 ENCOUNTER — APPOINTMENT (OUTPATIENT)
Dept: PHYSICAL MEDICINE AND REHAB | Facility: CLINIC | Age: 59
End: 2024-02-23
Payer: MEDICARE

## 2024-02-23 VITALS
OXYGEN SATURATION: 98 % | HEIGHT: 65 IN | WEIGHT: 185 LBS | HEART RATE: 102 BPM | BODY MASS INDEX: 30.82 KG/M2 | SYSTOLIC BLOOD PRESSURE: 147 MMHG | DIASTOLIC BLOOD PRESSURE: 81 MMHG

## 2024-02-23 DIAGNOSIS — M46.1 SACROILIITIS, NOT ELSEWHERE CLASSIFIED: ICD-10-CM

## 2024-02-23 DIAGNOSIS — M79.2 NEURALGIA AND NEURITIS, UNSPECIFIED: ICD-10-CM

## 2024-02-23 DIAGNOSIS — M21.162 VARUS DEFORMITY, NOT ELSEWHERE CLASSIFIED, LEFT KNEE: ICD-10-CM

## 2024-02-23 DIAGNOSIS — M21.161 VARUS DEFORMITY, NOT ELSEWHERE CLASSIFIED, RIGHT KNEE: ICD-10-CM

## 2024-02-23 DIAGNOSIS — M43.17 SPONDYLOLISTHESIS, LUMBOSACRAL REGION: ICD-10-CM

## 2024-02-23 DIAGNOSIS — M54.16 RADICULOPATHY, LUMBAR REGION: ICD-10-CM

## 2024-02-23 DIAGNOSIS — R26.9 UNSPECIFIED ABNORMALITIES OF GAIT AND MOBILITY: ICD-10-CM

## 2024-02-23 DIAGNOSIS — R26.89 OTHER ABNORMALITIES OF GAIT AND MOBILITY: ICD-10-CM

## 2024-02-23 DIAGNOSIS — M21.70 UNEQUAL LIMB LENGTH (ACQUIRED), UNSPECIFIED SITE: ICD-10-CM

## 2024-02-23 DIAGNOSIS — G89.29 NEURALGIA AND NEURITIS, UNSPECIFIED: ICD-10-CM

## 2024-02-23 PROCEDURE — G2211 COMPLEX E/M VISIT ADD ON: CPT

## 2024-02-23 PROCEDURE — 99205 OFFICE O/P NEW HI 60 MIN: CPT

## 2024-02-28 PROBLEM — M21.162 ACQUIRED GENU VARUM OF LEFT LOWER EXTREMITY: Status: ACTIVE | Noted: 2024-02-28

## 2024-02-28 PROBLEM — R26.9 GAIT DIFFICULTY: Status: ACTIVE | Noted: 2024-02-28

## 2024-02-28 PROBLEM — M21.161 ACQUIRED GENU VARUM OF RIGHT LOWER EXTREMITY: Status: ACTIVE | Noted: 2024-02-28

## 2024-02-28 PROBLEM — R26.89 IMPAIRMENT OF BALANCE: Status: ACTIVE | Noted: 2024-02-28

## 2024-02-28 NOTE — ASSESSMENT
[FreeTextEntry1] :                                                       Assessment/Plan:   CLARE ELAM is a 58 year female with multifactorial functional decline here for initial consultation.  S/p B/L TKA with revisions  Chronic neuropathic pain  Leg length discrepancy, Right Acquired spondylolisthesis of lumbosacral region Sacroilitis, Left   - Tiers of treatment and management of above diagnosis(es) were discussed with patient - Optimal diet, weight, sleep, and lifestyle management to minimize stress and maximize well being counseling provided - Imaging reviewed and discussed with patient - Reviewed previous encounter notes from 1/28/2022 Dr. DA Fay (Orthopedic Surgery Joint Reconstruction) - Patient was advised to start a structured, targeted therapy program 2-3x/wk for 8 wks with goal toward HEP - Patient was educated on an appropriate home exercise program, provided with exercise recommendations, all questions answered - Patient was advised to start Naproxen 500 mg BID with food/milk for 5-7 days to help with pain and to decrease inflammation, afterwards as needed - Patient may trial acetaminophen 1000mg up to TID PRN moderate to severe pain and to decrease average consumption of NSAIDs - Patient was advised to apply cool compresses or warm heat to affected regions PRN  Regarding shoe lift: patient has acquired leg length descrepancy [from AIIS to medial malleolus] 86cm on the right and 89cm on the left, she will benefit from gradual progressive shoe lifts that will assist her with safer, less painful gait  - Radiographs of lumbopelvic region reviewed in office today Feb 23, 2024    - Educated about red flag symptoms including (but not limited to) new, worsened, or persistent: fever greater than 100F, bowel or bladder incontinence, bowel obstipation, inability to void urine, urinary leakage, Severe nausea or vomiting, Worsening numbness, worsening tingling/paresthesias, and/or new or progressive motor weakness; advised to seek immediate medical attention at his nearest Emergency department should they experience any of the above   - Patient relates having minimal interest in locally directed treatment of their condition at this time, they were counseled on the role for local treatment as part of the tiers of treatment for their condition, all questions answered   - CT lumbar spine without contrast is indicated given that the pt has not improved with tylenol, ibuprofen, naproxen, meloxicam, they underwent non-diagnostic radiographic imaging of the region 1/2023, radicular lower limb pain, previous imaging from 1/2023 with multilevel lumbosacral spondylosis, axial pain worsening, concern for bony pathology and physical therapy/home exercise program>6 weeks. Patient's imaging is medically necessary to outline targets for locally (interventional) directed treatments and/or guide surgical management.   - Follow up in 3-4 weeks after consultation with Orthopedic Surgery Joint Reconstruction and after obtaining shoe lift   I have personally spent a total of at least 60 minutes preparing, reviewing internal and external records, explaining, counseling, providing necessary information via documented paperwork for this encounter, and coordinating care for this patient encounter   Thank you, Dr(s), for allowing me to participate in the care of your patient. Please do not hesitate to contact me with questions/concerns.   Brody Angulo M.D. Sports and Interventional Spine Department of Physical Medicine and Rehabilitation [facility] Physician [facility] Orthopaedic [facility] [address] [place] [address]   Appointments: [phone] Fax: [phone]

## 2024-02-28 NOTE — HISTORY OF PRESENT ILLNESS
[FreeTextEntry1] : Brody Angulo M.D. Sports Medicine and Interventional Spine Department of Physical Medicine and Rehabilitation [facility] Orthopaedic [facility] [address] [place] [address]   [facility] Orthopaedic [place] [facility] [address], [address] [place] [address]   For [place] Appointments Phone: [phone] Fax: [phone]   ----------------------------------------------------------------------------------------------------------------------------------------   PATIENT: CLARE ELAM MRN: [id number] YOB: 1965 DATE OF SERVICE: 02/23/2024 Feb 23, 2024   Dear Drs.   Thank you for referring CLARE ELAM to my Sports and Interventional Spine practice and office. Enclosed is a copy of the patient's consultation/progress note, which includes my complete assessment and recent studies completed during the patient's evaluation.   If you have questions or have any patients who require nonsurgical, non-opiate management of any sports, spine, or musculoskeletal conditions, please do not hesitate to contact my , Kerry Bazan at [phone].   I look forward to taking care of your patients along with you.   Sincerely,   Brody Angulo MD Sports, Interventional Spine, & Regenerative Musculoskeletal Medicine Orthopaedic [place] [facility]                                                     Initial Consultation:  CC: I am bent over and can't stand up straight  Pt is s/p R knee revision in June 2022 and L knee revision in December 2022.  Prior to those knee revisions, She had an emergency surgery at [facility] in 2012 for an infection in her L knee joint.  They placed a spacer in her L knee and she was wheelchair bound for 2.5 years.   HPI:  This is the first visit to [facility]'s Orthopaedic [place] [facility] Sports Medicine and Interventional Spine Practice.   CLARE ELAM presents with the chief complaint as above.   Initial Hx on 02/23/2024 : Presents in person to [facility] patient notes prolonged course of pain and functional limitations related to their knee replacements patient has low interest in PT, patient believes has experienced functional decline over the past few years patient notes that her use of walkers have also contributed to her overall decline patient had initial procedure in 2015 at [facility] for the Left TKA, right TKA was then in 2018 at [facility]  left revision was 6/2022 at [facility], 12/2022 right revision at [facility] following her recent procedures in 2022, patient did not have long stay either at inpatient or at subacute rehab patient presents for further discussion of their situation, low interest in PT or pharmacologic treatments patient received a shoe lift from [facility] via [facility] The patients difficulties began as above The pain is graded as moderate to severe "10/10" during movements, transfers from sitting to standing, upright position without support for more than a few seconds The pain is described as variable The pain is constant The pain does not radiate, mainly over the low back and over the left posterior thigh, "under my buttocks, downwards, nothing shooting up my back" The patient feels that the pain is overall persistent Patient denies other recent fall, MVA, injury, trauma, or accident besides presenting history above   Aggravating: as above variable standing unsupported, ambulation, prolonged sitting, prolonged standing, navigating stairs, getting out of bed, sit to stand transitional movements Alleviating: rest, activity modification, pharmacologic treatments as per intake and as above   Meds: denies regular PO pain medications; has been taking oxycodone 10mg TID Therapy Program: no recent structured targeted therapy program HEP: doing HEP regularly Injection Hx: denies locally directed treatment to the area in question Imaging Hx: reviewed   Assoc Sx: Denies progressive numbness, Tingling in the lower limbs, has persistent left foot "weird feeling" since prevoius procedures   Denies Focal motor weakness in the upper or lower limbs Denies New or worsened bowel or bladder incontinence Denies Saddle anesthesia Denies Using Orthotic(s)/Supportive devices Denies Swelling in the upper/lower extremities Denies Buckling Denies Clicking They also deny frequent tripping, falling   ROS: A 14 point review of systems was completed. Positive findings are pain as described above. The remaining systems negative.   Breast Cancer Surveillance: up to date COVID HX: reviewed   Assoc Hx: Ambulates without assistive device Level of functioning: indep with ambulation, indep with ADLs Living Situation: dwelling with steps to enter

## 2024-02-28 NOTE — PHYSICAL EXAM
[FreeTextEntry1] : Gen: A+O x 3 in NAD Psych: Normal mood and affect. Responds appropriately to commands Eyes: Anicteric. No discharge. EOMI. Resp: Breathing unlabored CV: DP pulses 2+ and equal. No varicosities noted Ext: No c/c/e Skin: No lesions noted   Gait: + antalgic poor foot clearance due to right > left lower limb deformities poor  reciprocating heel to toe unable to stand on toes and heels WITH hand holding/both hands on counter-top unable to Tandem gait intact WITH hand holding Poor single leg standing balance, B/L Romberg equivocal   unable to test for Trendelenburg sign with single leg stance  Inspection: Spine alignment is midline. Palpation: There is + tenderness over the midline spinous processes, paravertebral muscles of the thoracolumbar region   Lumbar ROM: Flexion, extension, side-bending, rotation, limited in most planes +pain with lateral flexion +pain with oblique extension +pain with lateral rotation   Hip ROM: pain at terminal ROM bilaterally. FAIR, FABERE negative bilaterally   + weakness with resisted external rotation of the hip flexed to 90, knee flexed to 90, and hip externally rotated 20 degrees RIGHT (gluteus medius) + weakness with resisted external rotation of the hip flexed to 90, knee flexed to 90, and hip externally rotated 20 degrees LEFT (gluteus medius)     TEST REGION      Hip Flex   Knee Ext   Ankle Dorsi  EHL   Ankle Plantar Strength Right Side  4/5         3/5                  3/5           4/5              3/5                         Strength Left Side.    4/5         3/5                  3/5           4/5              3/5                           Hip abduction R 3/5 L 3/5 Hip adduction R 4/5 L 4/5 Hip extension R 3/5 L 3/5 Knee Flexion R 4/5 L 4/5   unable to perform 10 calf raises on the left unable to perform 10 calf raises on the right   Tone: Normal. No clonus. Sensation: Grossly intact to light touch bilateral lower limbs. Proprioception: Intact at big toes bilaterally. Reflexes: 1+ symmetric knee jerk, ankle jerk. Plantars absent bilaterally.   SLR equivocal bilaterally Crossed SLR negative bilaterally. Slump Test equivocal bilaterally  RIGHT KNEE: no scars no effusion no laceration no increased warmth no erythema ++ varus deformity no valgus deformity absent J sign   ROM Limited range of motion with extension, lacking terminal 50 Limited range of motion with flexion, 50-80   Palpation + crepitus + TTP over the quadriceps tendon neg TTP over the patellar tendon neg TTP over the base of the patella neg TTP over the apex of the patella + TTP over the medial joint line + TTP over the lateral joint line neg TTP over pes anserine area in the medial face of the tibia neg TTP over tibial tuberosity neg TTP over fibular head   Manual Muscle Testing 4/5 in all planes with resisted isometric stress   + laxity with varus stress with the knee extended at 0 neg laxity with varus stress with the knee extended at 30 + laxity with valgus stress with the knee extended at 0 neg laxity with valgus stress with the knee extended at 30   Sensation intact to light touch over all aspects of the right lower leg Distal pulses intact   LEFT KNEE: no scars no effusion no laceration no increased warmth no erythema no varus deformity + valgus deformity absent  J sign   ROM Limited range of motion with extension, lacking terminal 20 Limited range of motion with flexion, 20-75   Palpation + crepitus neg TTP over the quadriceps tendon neg TTP over the patellar tendon neg TTP over the base of the patella neg TTP over the apex of the patella + TTP over the medial joint line + TTP over the lateral joint line neg TTP over pes anserine area in the medial face of the tibia neg TTP over tibial tuberosity neg TTP over fibular head neg TTP over the popliteal fossa   Manual Muscle Testing 3/5 in all planes with resisted isometric stress   + laxity with varus stress with the knee extended at 0 + laxity with varus stress with the knee extended at 30 + laxity with valgus stress with the knee extended at 0 neg laxity with valgus stress with the knee extended at 30  Sensation intact to light touch over all aspects of the right lower leg Distal pulses intact   + Antalgic gait

## 2024-03-20 ENCOUNTER — RESULT REVIEW (OUTPATIENT)
Age: 59
End: 2024-03-20

## 2024-03-20 ENCOUNTER — APPOINTMENT (OUTPATIENT)
Dept: ORTHOPEDIC SURGERY | Facility: CLINIC | Age: 59
End: 2024-03-20
Payer: MEDICARE

## 2024-03-20 ENCOUNTER — OUTPATIENT (OUTPATIENT)
Dept: OUTPATIENT SERVICES | Facility: HOSPITAL | Age: 59
LOS: 1 days | End: 2024-03-20
Payer: MEDICARE

## 2024-03-20 VITALS
HEART RATE: 98 BPM | BODY MASS INDEX: 30.82 KG/M2 | WEIGHT: 185 LBS | SYSTOLIC BLOOD PRESSURE: 142 MMHG | HEIGHT: 65 IN | DIASTOLIC BLOOD PRESSURE: 87 MMHG | OXYGEN SATURATION: 97 %

## 2024-03-20 DIAGNOSIS — Z96.652 PRESENCE OF LEFT ARTIFICIAL KNEE JOINT: Chronic | ICD-10-CM

## 2024-03-20 DIAGNOSIS — Z47.1 AFTERCARE FOLLOWING JOINT REPLACEMENT SURGERY: ICD-10-CM

## 2024-03-20 DIAGNOSIS — Z96.651 PRESENCE OF RIGHT ARTIFICIAL KNEE JOINT: Chronic | ICD-10-CM

## 2024-03-20 DIAGNOSIS — Z96.653 AFTERCARE FOLLOWING JOINT REPLACEMENT SURGERY: ICD-10-CM

## 2024-03-20 DIAGNOSIS — Z96.651 AFTERCARE FOLLOWING JOINT REPLACEMENT SURGERY: ICD-10-CM

## 2024-03-20 PROCEDURE — 73564 X-RAY EXAM KNEE 4 OR MORE: CPT | Mod: 26,50

## 2024-03-20 PROCEDURE — 99214 OFFICE O/P EST MOD 30 MIN: CPT

## 2024-03-20 PROCEDURE — 73564 X-RAY EXAM KNEE 4 OR MORE: CPT

## 2024-03-24 PROBLEM — Z47.1 AFTERCARE FOLLOWING RIGHT KNEE JOINT REPLACEMENT SURGERY: Noted: 2021-07-13

## 2024-03-24 PROBLEM — Z47.1 AFTERCARE FOLLOWING BILATERAL KNEE JOINT REPLACEMENT SURGERY: Status: ACTIVE | Noted: 2024-03-24

## 2024-03-24 NOTE — END OF VISIT
[FreeTextEntry3] : Documented by Meghna Krishna acting as a scribe for Dr. Kevon Fay. 03/20/2024.   All medical record entries made by the Scribe were at my, Dr. Kevon Fay, direction and personally dictated by me on 03/20/2024. I have reviewed the chart and agree that the record accurately reflects my personal performance of the history, physical exam, assessment, and plan. I have also personally directed, reviewed, and agreed with the chart.

## 2024-03-24 NOTE — HISTORY OF PRESENT ILLNESS
[de-identified] : 03/20/2024: 57 y/o F patient presents for a follow-up evaluation of bilateral knee total arthroplasty. I last saw her in the preoperative holding area in August 2021, at which time she was scheduled to undergo second stage right knee reimplantation but fled the hospital after refusing to sign consent for possible plastic surgery flap coverage. My staff and I had made several further attempts to contact her for rescheduling but she never did return. Chart review indicates that she saw various other orthopedists for ongoing surgical management. She reports having eventually undergone revisions on both her right and left knees, with the most recent procedures performed at the end of June 2022 and subsequently in December 2022, both by Dr. Ramos at [facility]. Over the last six months, she has experienced left anterior thigh pain exacerbated by sitting and difficulty walking. She has also been under the care of Dr. Angulo. Despite taking oxycodone 10 milligrams, she reports no significant pain relief. She has not seen Dr. Ramos for approximately 1 year, for unclear reasons.  6/7/21: Reports that the left knee pain is about the same as when we last saw each other. She was never able to stop smoking and our surgical plans had been disrupted by the pandemic. However, it is now the right knee that is far more symptomatic. She had an episode in March where both legs became swollen and red. This eventually resolved but the right knee developed progressive pain and varus deformity from there. She is still ambulating with a cane but has enormous difficulty crossing a room. She is seeking surgical management as soon as possible. She denies wound problems, fevers, chills, or other systemic symptoms.  1/14/20: 53y/o female presenting for evaluation of troublesome bilateral total knee replacements. She reports that she had septic arthritis of the left knee in 2012. This was treated with two I&Ds at [facility] in 2012, followed by an antibiotic spacer later that year. She ambulated on the spacer for over two years before a definitive TKA was performed at [facility] in 2015. She got some improvement in left knee function for some time, but then the knee became more painful and swollen at some unspecified amount of time later. She complains also of left knee instability. She underwent right TKA at [place] in March 2019. The right side is doing ok but she complains of an apparently limb-length discrepancy now with the right side longer. She has been using a shoe lift without much relief. She saw Dr. Barraza for all these complaints in August and a knee aspiration was performed. Symptoms are about the same now. She is seeking surgical evaluation.  PMH significant only for asthma. She admits to smoking about a half pack per day on average.

## 2024-03-24 NOTE — DISCUSSION/SUMMARY
[de-identified] : Imp: 59 y/o F status post complex, multiply revised bilateral knee replacements.  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 - I cannot understand why she returned to me after she abandoned a charted course of surgical management with me and then ignored our repeated requests for followup.  Our relationship was damaged by this and I informed her that I will not act as her surgeon in the future.  I strongly encouraged her to return to her most recent index orthopedic knee surgeon, Dr. Charlie Ramos, for further evaluation and management.

## 2024-03-24 NOTE — PHYSICAL EXAM
[de-identified] : General appearance: well nourished and hydrated, pleasant, alert and oriented x 3, cooperative.   HEENT: normocephalic, EOM intact, wearing mask, external auditory canal clear.   Cardiovascular: no lower leg edema, no varicosities, dorsalis pedis pulses palpable and symmetric. Patient exhibits bilateral venous stasis changes affecting the lower legs, with the left leg demonstrating more pronounced changes compared to the right leg. Lymphatics: no palpable lymphadenopathy, no lymphedema.   Neurologic: sensation is normal, no muscle weakness in upper or lower extremities, patella tendon reflexes present and symmetric.   Dermatologic: skin moist, warm, no rash.   Spine: cervical spine with normal lordosis and painless range of motion, thoracic spine with normal kyphosis and painless range of motion, lumbosacral spine with normal lordosis and painless range of motion.   Gait:  Pt present with a tall rollator. She demonstrates bilateral caution and antalgic as well as bilateral varus attitude of her bilateral knees. She has a markedly forward leaning posture from the lumbopelvic junction.  Left knee:  - Focal soft tissue swelling: none - Ecchymosis: none - Erythema: none - Effusion: It's difficult for me to assess whether there's any effusion or not. No palpable Baker's cyst - Wounds: well healed anterior midline surgical incision with some scar widening superior to the patella. Bulky arthrofibrosis palpable in the suprapatellar pouch. - Alignment: varus - Tenderness: none - ROM: 10-70 - Collateral laxity: stable - Cruciate laxity: stable - Quad strength: 5/5, approximately 15 degree extensor lag.  Right knee: - Focal soft tissue swelling: none - Ecchymosis: none - Erythema: none - Effusion: none - Wounds: well healed anterior midline surgical incision with some scar widening over the tibial tubercle, overall benign appearing. - Alignment: varus - Tenderness: none - ROM: 15-90 - Collateral laxity: stable - Cruciate laxity: stable - Quad strength: 4+/5, approximately 30 degree extensor lag. [de-identified] : 03/20/2024: Bilateral knee x-rays were taken today, March 20th, 2024 at the [facility]. These demonstrate a revision right total knee arthroplasty in position, which appears to be a Roslyn RHK, with a medial tibial augment and a tibial cone. All components appear to be well fixed in reasonable alignment without evidence of mechanical complication, the patella sits baja, and articulates with the medial femoral condyle of the femoral implant. Right knee does demonstrate overall varus alignment, which appears to be arising from a pronounced bowing of the distal femur. Left knee demonstrates a revision total knee arthroplasty in position, which appears to be a Roslyn PRK, with proximal tibial and distal femoral metaphyseal cones. All components appear to be well fixed in reasonable alignment, though there are some radioleucencies about the stem of the femoral component and the femoral component is in some varus. The patella sits borderline baja and track centrally.

## 2024-04-22 ENCOUNTER — APPOINTMENT (OUTPATIENT)
Dept: ORTHOPEDIC SURGERY | Facility: CLINIC | Age: 59
End: 2024-04-22

## 2024-04-29 ENCOUNTER — APPOINTMENT (OUTPATIENT)
Dept: PHYSICAL MEDICINE AND REHAB | Facility: CLINIC | Age: 59
End: 2024-04-29

## 2024-04-29 NOTE — ASSESSMENT
[FreeTextEntry1] :                                                       Assessment/Plan:   CLAER ELAM is a 58 year female with multifactorial functional decline here for follow up visit  S/p B/L TKA with revisions  Chronic neuropathic pain  Leg length discrepancy, Right Lumbosacral spondylosis without myelopathy Acquired spondylolisthesis of lumbosacral region Sacroilitis, Left   - Tiers of treatment and management of above diagnosis(es) were discussed with patient - Optimal diet, weight, sleep, and lifestyle management to minimize stress and maximize well being counseling provided - Imaging reviewed and discussed with patient - Reviewed previous encounter notes from 1/28/2022 Dr. DA Fay (Orthopedic Surgery Joint Reconstruction) - Patient was advised to start a structured, targeted therapy program 2-3x/wk for 8 wks with goal toward HEP - Patient was educated on an appropriate home exercise program, provided with exercise recommendations, all questions answered - Patient may trial acetaminophen 1000mg up to TID PRN moderate to severe pain and to decrease average consumption of NSAIDs - Patient was advised to apply cool compresses or warm heat to affected regions PRN  Apr 29, 2024: Regarding shoe lift: patient has acquired leg length descrepancy [from AIIS to medial malleolus] 86cm on the right and 89cm on the left, she will benefit from gradual progressive shoe lifts that will assist her with safer, less painful gait  - Radiographs of lumbopelvic region reviewed in office today Feb 23, 2024    - Educated about red flag symptoms including (but not limited to) new, worsened, or persistent: fever greater than 100F, bowel or bladder incontinence, bowel obstipation, inability to void urine, urinary leakage, Severe nausea or vomiting, Worsening numbness, worsening tingling/paresthesias, and/or new or progressive motor weakness; advised to seek immediate medical attention at his nearest Emergency department should they experience any of the above   - Patient relates having minimal interest in locally directed treatment of their condition at this time, they were counseled on the role for local treatment as part of the tiers of treatment for their condition, all questions answered    - Follow up in 3-4 weeks after consultation with Orthopedic Surgery Joint Reconstruction and after obtaining shoe lift   I have personally spent a total of at least 45minutes preparing, reviewing internal and external records, explaining, counseling, providing necessary information via documented paperwork for this encounter, and coordinating care for this patient encounter   Thank you, (s), for allowing me to participate in the care of your patient. Please do not hesitate to contact me with questions/concerns.   Brody Angulo M.D. Sports and Interventional Spine Department of Physical Medicine and Rehabilitation [facility] [address] [place] [address]   Appointments: [phone] Fax: [phone]

## 2024-04-29 NOTE — HISTORY OF PRESENT ILLNESS
[FreeTextEntry1] : Brody Angulo M.D. Sports Medicine and Interventional Spine Department of Physical Medicine and Rehabilitation [facility] Orth[facility] [address] [place] [address]   [facility] Physician [facility] [place] [facility] [address] [place] [address]   For [place] Appointments Phone: [phone] Fax: [phone]   ----------------------------------------------------------------------------------------------------------------------------------------   PATIENT: CLARE ELAM MRN: [id number] YOB: 1965 DATE OF SERVICE: Apr 29, 2024 Apr 29, 2024    Dear Drs.   Thank you for referring CLARE ELAM to my Sports and Interventional Spine practice and office. Enclosed is a copy of the patient's consultation/progress note, which includes my complete assessment and recent studies completed during the patient's evaluation.   If you have questions or have any patients who require nonsurgical, non-opiate management of any sports, spine, or musculoskeletal conditions, please do not hesitate to contact my , Kerry Bazan at [phone].   I look forward to taking care of your patients along with you.   Sincerely,   Brody Angulo MD Sports, Interventional Spine, & Regenerative Musculoskeletal Medicine Orthopaedic [place] [facility]                                                     Follow Up Visit  CC: I am bent over and can't stand up straight  HPI:  This is the first visit to [facility] [place] [facility].  CLARE ELAM presents with the chief complaint as above.  Interval Hx on Apr 29, 2024: presents for follow up. Since last visit, she had consultation with Orthopedic Surgery Joint Reconstruction for their complex multiply revised bilateral knee replacements.  There have been no significant changes to their aggravating or alleviating factors since the last visit. Since the last visit, they relate significant improvements in pain previously associated with known exacerbating, aggravating factors and situations. Pharmacologic treatments now include OTC analgesics PRN, but otherwise pharmacologic treatments are minimal. Given dearth of symptoms, pharmacologic treatments are now minimal. Denies new or worsened numbness, tingling, or focal motor deficit. Denies interval fall, accident, or injury. Denies change in bowel or bladder functioning.   Initial Hx on 02/23/2024: Pt is s/p R knee revision in June 2022 and L knee revision in December 2022. Prior to those knee revisions, She had an emergency surgery at [facility] in 2012 for an infection in her L knee joint. They placed a spacer in her L knee and she was wheelchair bound for 2.5 years. Presents in person to [facility]. patient notes prolonged course of pain and functional limitations related to their knee replacements patient has low interest in PT, patient believes has experienced functional decline over the past few years. patient notes that her use of walkers have also contributed to her overall decline. patient had initial procedure in 2015 at [facility] for the Left TKA, right TKA was then in 2018 at [facility]. left revision was 6/2022 at [facility], 12/2022 right revision at [facility]. following her recent procedures in 2022, patient did not have long stay either at inpatient or at subacute rehab. patient presents for further discussion of their situation, low interest in PT or pharmacologic treatments. patient received a shoe lift from [employer] via [facility] [place] The patients difficulties began as above. The pain is graded as moderate to severe "10/10". during movements, transfers from sitting to standing, upright position without support for more than a few seconds. The pain is described as variable. The pain is constant. The pain does not radiate, mainly over the low back and over the left posterior thigh, "under my buttocks, downwards, nothing shooting up my back". The patient feels that the pain is overall persistent. Patient denies other recent fall, MVA, injury, trauma, or accident besides presenting history above. Aggravating: as above variable standing unsupported, ambulation, prolonged sitting, prolonged standing, navigating stairs, getting out of bed, sit to stand transitional movements. Alleviating: rest, activity modification, pharmacologic treatments as per intake and as above   Meds: denies regular PO pain medications; has been taking oxycodone 10mg TID Therapy Program: no recent structured targeted therapy program HEP: doing HEP regularly Injection Hx: denies locally directed treatment to the area in question Imaging Hx: reviewed   Assoc Sx: Denies progressive numbness, Tingling in the lower limbs, has persistent left foot "weird feeling" since prevoius procedures   Denies Focal motor weakness in the upper or lower limbs Denies New or worsened bowel or bladder incontinence Denies Saddle anesthesia Denies Using Orthotic(s)/Supportive devices Denies Swelling in the upper/lower extremities Denies Buckling Denies Clicking They also deny frequent tripping, falling   ROS: A 14 point review of systems was completed. Positive findings are pain as described above. The remaining systems negative.   Breast Cancer Surveillance: up to date COVID HX: reviewed   Assoc Hx: Ambulates without assistive device Level of functioning: indep with ambulation, indep with ADLs Living Situation: dwelling with steps to enter

## 2024-04-29 NOTE — DATA REVIEWED
[Plain X-Rays] : plain X-Rays [FreeTextEntry1] : CC: [id number]     EXAM:  XR KNEE COMP 4+ VIEWS BI   ORDERED BY: SARAH KU PROCEDURE DATE:  03/20/2024 INTERPRETATION:  CLINICAL INDICATION: bilateral knee pain; bilateral knee replacements EXAM: Frontal oblique tunnel/notch lateral and sunrise views of both knees from 3/20/2024 at 1557. Compared to prior study from 6/7/2021. IMPRESSION: Constrained right and unconstrained left longstem revision total knee prostheses present with intact aligned hardware components and no acute periprosthetic fractures. Redemonstrated periprosthetic lucent halos with thin sclerotic margination around left femoral and tibial components indicative of loosening. Associated lateral migration of left femoral stem within femoral shaft and with overlying convex lateral femoral cortical remodeling changes. No suspicious periprosthetic lucency on the right. Chronic bilateral periarticular ossific debris.  SITE PERFORMED: [facility] PHONE: [phone] Patient: CLARE ELAM YOB: 1965 Phone: [phone] MRN: [id number] Acc: [id number] Date of Exam: 03-  EXAM: CT LUMBAR SPINE WITHOUT CONTRAST Note - This patient has received 0 CT studies and 0 Myocardial Perfusion studies within our network over the previous 12 month period. HISTORY: Inability to walk without assistance for 3 years.  TECHNIQUE: Axial images were obtained through the lumbar spine without intravenous contrast administration. Sagittal and coronal reformatted images were obtained. One or more of the following dose reduction techniques were used: automated exposure control, adjustment of the mA and/or kV according to patient size, use of iterative reconstruction technique.  COMPARISON: None. FINDINGS: The alignment demonstrates grade 1 anterolisthesis at L4-5 measuring 8 mm. Mild levoscoliotic curvature. Vertebral body heights well-maintained at all levels. There is no fracture or dislocation. There is advanced disc space narrowing and vacuum changes at L4-5 and L5-S1. L1-2: No disc herniation, central canal or neural foraminal stenosis.  L2-3: No disc herniation, central canal or neural foraminal stenosis.  L3-4: Disc bulge and a small right paracentral disc protrusion indenting the thecal sac. Facet and ligamentous hypertrophy with vacuum changes in the facet joints bilaterally. Findings contribute to borderline central canal stenosis and mild right neural foraminal stenosis. The left neural foramen is patent.  L4-5: Grade 1 anterolisthesis, advanced degenerative endplate changes including vacuum phenomenon, subchondral sclerosis and cystic change. Circumferential disc bulge and facet hypertrophy contribute to moderately severe bilateral neural foraminal stenosis and severe bilateral lateral recess stenosis.  L5-S1: Central disc protrusion flattening the thecal sac and contacting both S1 nerve roots. Facet hypertrophy contributes to moderately severe bilateral neural foraminal stenosis and moderate bilateral lateral recess stenosis.  The paravertebral soft tissues are normal. IMPRESSION: 1.    L3-4 small right paracentral disc protrusion indenting the thecal sac. Borderline central canal and mild right neural foraminal stenosis. 2.  L4-5 grade 1 anterolisthesis with advanced degenerative disc changes, circumferential disc bulge and facet arthropathy. Findings contribute to moderately severe bilateral neural foraminal stenosis and severe lateral recess stenosis. Correlate for bilateral L4 and L5 radiculopathy. 3.  L5-S1 advanced disc space narrowing and central disc protrusion abutting the thecal sac and both S1 nerve roots. Facet hypertrophy results in moderately severe bilateral neural foraminal stenosis and moderate lateral recess stenosis. Correlate for bilateral L5 radiculopathy.

## 2024-05-07 ENCOUNTER — APPOINTMENT (OUTPATIENT)
Dept: PHYSICAL MEDICINE AND REHAB | Facility: CLINIC | Age: 59
End: 2024-05-07

## 2024-05-08 NOTE — PHYSICAL THERAPY INITIAL EVALUATION ADULT - THERAPY FREQUENCY, PT EVAL
Pre-Op Call IV Protocol     Have you ever had any difficulty with IV insertions in the past? No   If Yes, document difficult IV:  SB COMMENTS    Pre-Op Call Operative Patient Instructions     Name/Date/Time person receiving instructions:      CONTACTS       PRE-OP CALL  Please be aware there are 2 different locations.   Pavilion: Parking is available in Parking Garage [address] on [address] [place] [address] & [facility].  We recommend entering from the Pedestrian walkway bridge located on the 2nd floor of Parking Gar[address]. The 2nd floor doors do not open until 5am. The Outpatient Pavilion main lobby entrance does not open until 6am.  is also available on the main entrance ground floor of the Outpatient Pavilion on [facility]. during the hours of 6am-2pm. Please check in with the  Desk right off the 2nd floor Pedestrian walkway entrance.  Hospital:  Please arrive at Entrance F on [facility]. The doors at Entrance F do not open until 5am. Parking access is located across the street from Entrance F in Parking Lot E. If closer access is needed for drop off, your  can drop you off at the door by taking the ramp at the right at Entrance F. Please be aware that there is no parking at the drop off area. Check in with guest service at the  at Entrance F, and you will be given instructions from there to Hospital Surgery.  Two family members or friends who are over the age of 18 may accompany you. No children under the age of 18 are permitted for visiting.    When you go home, you will not be able to drive or take public transportation alone (ie.bus/taxi/uber). You will need an adult (age 18 or older) to take you home or travel with you.  If you have sedation/anesthesia, a responsible adult (age 18 or older) will need to be with you to get instructions for what you will need to do at home.  You will need a responsible adult to stay with you for 24 hours after the surgery.  If you do not have  sedation/anesthesia, we must be able to contact someone by phone to have them pick you up.  We will need to have their contact information when you check in.  Your surgery will be cancelled if these arrangements have not been made.  (list name of person accompanying patient home if known) - DISCHARGE PLANNING  Children under 18 years old MUST have a parent/guardian with them at all times for the duration of their surgery.  If you become ill prior to surgery (ie. fever, vomiting), please notify your surgeon immediately.  Please bring and adhere to the following on the Day of Surgery:  Insurance Card and Referral (if required), 's license or photo id  Your medication list  Advance directives (living de dios, healthcare power of )  All information on your pacemaker of AICD, if appropriate  Any other forms, imaging studies (x-rays/MRI/CT) the doctor may have given to you  Any medical devices (ie, crutches, brace, sling)  If you use a Cpap machine and are staying overnight please bring it with you and know your settings.  If you use a rescue inhaler for asthma, please bring it in to the hospital.  Keep personal items to a minimum. Suggested items to bring include toothbrush and toiletries if spending the night after surgery.  Loose fitting clothing and walking shoes if applicable  For comfort measures, you may bring headphones/music device/magazines that can be given to family when you go into the operating room.  ON THE DAY OF SURGERY: Do not wear contact lenses, make-up, nail polish, jewelry, lotion, or deodorant.  Leave all valuables at home except what you will need or are instructed to bring.  Additional reminders for Pediatric patients:   Your child can wear their pajamas and bring a favorite toy/blanket/game with them.  No jewelry please.   Formula for feeding after surgery or favorite sippy cup.   Please bring diapers or a change of underpants for young children.    NPO/Eating Drinking restrictions:  Please note: If these guidelines are not followed, your procedure will be delayed and possibly cancelled.     Arrival time:    PRE-OP CALL QUESTIONS  Note to RN: For cases starting on or after 3pm consult the anesthesia department for NPO status.   NPO/Eating Drinking Restrictions prior to arrival time: for Adults/Pediatrics     Non Diabetics: Acceptable clear Liquids for adult and pediatric patients 1 hour prior to arrival time:   Water  Clear Electrolyte-replenishing drinks such as Pedialyte, Gatorade, Powerade, or Propel   (NO yogurt or smoothies or energy drinks)  Clear fruit juices, such as Apple juice without fiber (non-organic), cranberry juice and grape juice (NO pulp)  7-up/ Sprite  Black coffee or tea (NO additives which includes milk, creamer, sweeteners, honey, lemon)   Clear chicken or beef broth or bouillon. (NO homemade broth)   Diabetics: Acceptable clear Liquids for adult and pediatric patients 1 hours prior to arrival time:   Any of the items listed above ONLY if they are sugar free clear drinks.   Unacceptable Clear Liquids for ALL adult and pediatric patients:   Coffee or Tea with milk products or lemon  Orange juice  Alcohol   For Pediatric Patients Only: Breast Milk/Infant Formula and non-fat/protein meals (ie. cow/almond/soy milk) Restrictions:  Breast Milk is allowed 2 hours prior to arrival. Infant formula and non-fat protein meals (ie. cow/almond/soy milk) is allowed up to 4 hours prior to arrival.  DO NOT ADD anything such as cereal to these or surgery may be delayed or canceled.     Do NOT eat or consume any food or dairy after midnight. This includes candy and gum  Do NOT smoke, drink alcohol, or use recreational drugs prior to your surgery.    Glp-1: CHECK MEDS TO HOLD FOR ADDITIONAL DIETARY RESTRICTIONS FOR GLP-1.    Patient verbalized understanding? N/A    TAKE the following medications the morning of surgery with a small sip of water: HOME MEDICATIONS    ARRIVAL TIME TEXT  MESSAGING:  I do not know your arrival time yet.  We will be sending you a text with this information when we have it along with an electronic copy of the instructions I just gave you. (Reinforce with GLP-1 patients to follow verbal instructions provided for clear liquids not what is on the text message) Please confirm CONTACTS is the phone number you would like this text to be sent. When you receive the text, Please click the link to confirm you have received it.     Please call the location of surgery with questions:   Outpatient Pavilion:  Monday-Friday 5am-7pm: [phone],  After hours: [phone]    Hospital   Monday-Friday: 5am-7pm [phone], After hours: [phone]    daily

## 2024-06-10 ENCOUNTER — APPOINTMENT (OUTPATIENT)
Dept: ORTHOPEDIC SURGERY | Facility: CLINIC | Age: 59
End: 2024-06-10

## 2024-06-17 ENCOUNTER — APPOINTMENT (OUTPATIENT)
Dept: ORTHOPEDIC SURGERY | Facility: CLINIC | Age: 59
End: 2024-06-17

## 2024-06-24 ENCOUNTER — APPOINTMENT (OUTPATIENT)
Dept: PHYSICAL MEDICINE AND REHAB | Facility: CLINIC | Age: 59
End: 2024-06-24

## 2024-07-15 ENCOUNTER — APPOINTMENT (OUTPATIENT)
Dept: PHYSICAL MEDICINE AND REHAB | Facility: CLINIC | Age: 59
End: 2024-07-15

## 2024-07-30 ENCOUNTER — APPOINTMENT (OUTPATIENT)
Dept: PHYSICAL MEDICINE AND REHAB | Facility: CLINIC | Age: 59
End: 2024-07-30

## 2024-08-19 ENCOUNTER — APPOINTMENT (OUTPATIENT)
Dept: ORTHOPEDIC SURGERY | Facility: CLINIC | Age: 59
End: 2024-08-19

## 2024-09-16 ENCOUNTER — APPOINTMENT (OUTPATIENT)
Dept: ORTHOPEDIC SURGERY | Facility: CLINIC | Age: 59
End: 2024-09-16
Payer: MEDICARE

## 2024-09-16 VITALS — HEIGHT: 64 IN | WEIGHT: 184 LBS | BODY MASS INDEX: 31.41 KG/M2

## 2024-09-16 DIAGNOSIS — Z96.659 OTHER MECHANICAL COMPLICATION OF OTHER INTERNAL JOINT PROSTHESIS, INITIAL ENCOUNTER: ICD-10-CM

## 2024-09-16 DIAGNOSIS — Z96.653 PRESENCE OF ARTIFICIAL KNEE JOINT, BILATERAL: ICD-10-CM

## 2024-09-16 DIAGNOSIS — T84.098A OTHER MECHANICAL COMPLICATION OF OTHER INTERNAL JOINT PROSTHESIS, INITIAL ENCOUNTER: ICD-10-CM

## 2024-09-16 PROCEDURE — 73562 X-RAY EXAM OF KNEE 3: CPT | Mod: 50

## 2024-09-16 PROCEDURE — 99214 OFFICE O/P EST MOD 30 MIN: CPT

## 2024-09-19 NOTE — PHYSICAL EXAM
[de-identified] : General appearance: well nourished and hydrated, pleasant, alert and oriented x 3, cooperative. Obese, pendulous thighs. Redness to both lower extremities. Bilateral pes planus.\par  HEENT: Normocephalic, EOM intact, Nasal septum midline, Oral cavity clear, External auditory canal clear.\par  Cardiovascular: no apparent abnormalities, bilateral lower leg edema, no varicosities, pedal pulses are palpable.\par  Lymphatics/Lymph nodes: none palpated, Lymphedema: not present.\par  Neurologic: sensation is normal, no muscle weakness in upper or lower extremities, patella tendon reflexes intact. \par  Dermatologic: no apparent skin lesions, moist, warm, no rash.\par  Spine: cervical spine appears normal and moves freely, thoracic spine appears normal and moves freely, lumbosacral spine is limited. Kyphosis. \par  Gait: non-antalgic. Uses walker to ambulate. \par  \par  RIGHT Knee\par  Inspection: no effusion or erythema. varus deformity.\par  Wounds: none. \par  Alignment: normal. 10 degree flexion contracture.\par  Palpation: no specific tenderness on palpation.\par  ROM: Active (in degrees): \par  Ligamentous laxity (neg): all ligaments appear stable, negative ant. drawer test, negative post. drawer test, stable to varus stress test, stable to valgus stress test, negative Lachman's test, negative pivot shift test.\par  Meniscal test: negative Bijan's, negative Bar.\par  Patellofemoral Alignment Test: Q-angle-, normal.\par  Muscle Test: good quad strength.\par  Leg examination: calf is soft and non-tender.\par  \par  LEFT Knee\par  Inspection: no effusion or erythema.\par  Wounds: healed midline incision \par  Alignment: normal.\par  Palpation: no specific tenderness on palpation.\par  ROM: Active (in degrees): 0-115\par  Ligamentous laxity (neg): all ligaments appear stable, negative ant. drawer test, negative post. drawer test, stable to varus stress test, stable to valgus stress test, negative Lachman's test, negative pivot shift test.\par  Meniscal test: negative Bijan's, negative Bar.\par  Patellofemoral Alignment Test: Q-angle-, normal.\par  Muscle Test: good quad strength.\par  Leg examination: calf is soft and non-tender.\par  \par   [de-identified] : Left knee x-ray, AP, lateral, merchant view taken at the office today demonstrates a revision total knee replacement CCK prosthesis in satisfactory position and alignment. Radiolucent lines adjacent to the femoral stem cortical extension grade 1. Possible loosening. The patella sits in a central position.  Right knee x-ray, AP, lateral, merchant view taken at the office today demonstrates a revision total knee replacement hinge implant in satisfactory position and alignment. Radiolucent lines around the femoral stem, tibial component appears to be well fixed. No evidence of loosening. The patella plasty  is tracking medially.

## 2024-09-19 NOTE — PHYSICAL EXAM
[de-identified] : General appearance: well nourished and hydrated, pleasant, alert and oriented x 3, cooperative. Obese, pendulous thighs. Redness to both lower extremities. Bilateral pes planus.\par  HEENT: Normocephalic, EOM intact, Nasal septum midline, Oral cavity clear, External auditory canal clear.\par  Cardiovascular: no apparent abnormalities, bilateral lower leg edema, no varicosities, pedal pulses are palpable.\par  Lymphatics/Lymph nodes: none palpated, Lymphedema: not present.\par  Neurologic: sensation is normal, no muscle weakness in upper or lower extremities, patella tendon reflexes intact. \par  Dermatologic: no apparent skin lesions, moist, warm, no rash.\par  Spine: cervical spine appears normal and moves freely, thoracic spine appears normal and moves freely, lumbosacral spine is limited. Kyphosis. \par  Gait: non-antalgic. Uses walker to ambulate. \par  \par  RIGHT Knee\par  Inspection: no effusion or erythema. varus deformity.\par  Wounds: none. \par  Alignment: normal. 10 degree flexion contracture.\par  Palpation: no specific tenderness on palpation.\par  ROM: Active (in degrees): \par  Ligamentous laxity (neg): all ligaments appear stable, negative ant. drawer test, negative post. drawer test, stable to varus stress test, stable to valgus stress test, negative Lachman's test, negative pivot shift test.\par  Meniscal test: negative Bijan's, negative Bar.\par  Patellofemoral Alignment Test: Q-angle-, normal.\par  Muscle Test: good quad strength.\par  Leg examination: calf is soft and non-tender.\par  \par  LEFT Knee\par  Inspection: no effusion or erythema.\par  Wounds: healed midline incision \par  Alignment: normal.\par  Palpation: no specific tenderness on palpation.\par  ROM: Active (in degrees): 0-115\par  Ligamentous laxity (neg): all ligaments appear stable, negative ant. drawer test, negative post. drawer test, stable to varus stress test, stable to valgus stress test, negative Lachman's test, negative pivot shift test.\par  Meniscal test: negative Bijan's, negative Bar.\par  Patellofemoral Alignment Test: Q-angle-, normal.\par  Muscle Test: good quad strength.\par  Leg examination: calf is soft and non-tender.\par  \par   [de-identified] : Left knee x-ray, AP, lateral, merchant view taken at the office today demonstrates a revision total knee replacement CCK prosthesis in satisfactory position and alignment. Radiolucent lines adjacent to the femoral stem cortical extension grade 1. Possible loosening. The patella sits in a central position.  Right knee x-ray, AP, lateral, merchant view taken at the office today demonstrates a revision total knee replacement hinge implant in satisfactory position and alignment. Radiolucent lines around the femoral stem, tibial component appears to be well fixed. No evidence of loosening. The patella plasty  is tracking medially.

## 2024-09-19 NOTE — PHYSICAL EXAM
Medical Social Work     GABINO faxed mental health referrals to [facility] and [facility]. Fax complete.       Plan: Patient will transfer to mental health facility once acceptance is obtained     [de-identified] : General appearance: well nourished and hydrated, pleasant, alert and oriented x 3, cooperative. Obese, pendulous thighs. Redness to both lower extremities. Bilateral pes planus.\par  HEENT: Normocephalic, EOM intact, Nasal septum midline, Oral cavity clear, External auditory canal clear.\par  Cardiovascular: no apparent abnormalities, bilateral lower leg edema, no varicosities, pedal pulses are palpable.\par  Lymphatics/Lymph nodes: none palpated, Lymphedema: not present.\par  Neurologic: sensation is normal, no muscle weakness in upper or lower extremities, patella tendon reflexes intact. \par  Dermatologic: no apparent skin lesions, moist, warm, no rash.\par  Spine: cervical spine appears normal and moves freely, thoracic spine appears normal and moves freely, lumbosacral spine is limited. Kyphosis. \par  Gait: non-antalgic. Uses walker to ambulate. \par  \par  RIGHT Knee\par  Inspection: no effusion or erythema. varus deformity.\par  Wounds: none. \par  Alignment: normal. 10 degree flexion contracture.\par  Palpation: no specific tenderness on palpation.\par  ROM: Active (in degrees): \par  Ligamentous laxity (neg): all ligaments appear stable, negative ant. drawer test, negative post. drawer test, stable to varus stress test, stable to valgus stress test, negative Lachman's test, negative pivot shift test.\par  Meniscal test: negative Bijan's, negative Bar.\par  Patellofemoral Alignment Test: Q-angle-, normal.\par  Muscle Test: good quad strength.\par  Leg examination: calf is soft and non-tender.\par  \par  LEFT Knee\par  Inspection: no effusion or erythema.\par  Wounds: healed midline incision \par  Alignment: normal.\par  Palpation: no specific tenderness on palpation.\par  ROM: Active (in degrees): 0-115\par  Ligamentous laxity (neg): all ligaments appear stable, negative ant. drawer test, negative post. drawer test, stable to varus stress test, stable to valgus stress test, negative Lachman's test, negative pivot shift test.\par  Meniscal test: negative Bijan's, negative Bar.\par  Patellofemoral Alignment Test: Q-angle-, normal.\par  Muscle Test: good quad strength.\par  Leg examination: calf is soft and non-tender.\par  \par   [de-identified] : Left knee x-ray, AP, lateral, merchant view taken at the office today demonstrates a revision total knee replacement CCK prosthesis in satisfactory position and alignment. Radiolucent lines adjacent to the femoral stem cortical extension grade 1. Possible loosening. The patella sits in a central position.  Right knee x-ray, AP, lateral, merchant view taken at the office today demonstrates a revision total knee replacement hinge implant in satisfactory position and alignment. Radiolucent lines around the femoral stem, tibial component appears to be well fixed. No evidence of loosening. The patella plasty  is tracking medially.

## 2024-10-15 NOTE — DISCHARGE NOTE NURSING/CASE MANAGEMENT/SOCIAL WORK - PATIENT PORTAL LINK FT
Returned the pt's call no answer and couldn't leave a voicemail the box is full. Pt. Does have an appt. Scheduled.   You can access the FollowMyHealth Patient Portal offered by [facility] by registering at the following website: [email or web address]. By joining [facility]’s FollowMyHealth portal, you will also be able to view your health information using other applications (apps) compatible with our system.